# Patient Record
Sex: MALE | Race: ASIAN | Employment: OTHER | URBAN - METROPOLITAN AREA
[De-identification: names, ages, dates, MRNs, and addresses within clinical notes are randomized per-mention and may not be internally consistent; named-entity substitution may affect disease eponyms.]

---

## 2017-04-24 ENCOUNTER — OFFICE VISIT (OUTPATIENT)
Dept: INTERNAL MEDICINE | Facility: CLINIC | Age: 67
End: 2017-04-24
Payer: COMMERCIAL

## 2017-04-24 VITALS
HEIGHT: 62 IN | HEART RATE: 91 BPM | OXYGEN SATURATION: 98 % | WEIGHT: 135.2 LBS | BODY MASS INDEX: 24.88 KG/M2 | DIASTOLIC BLOOD PRESSURE: 60 MMHG | TEMPERATURE: 97.9 F | SYSTOLIC BLOOD PRESSURE: 114 MMHG

## 2017-04-24 DIAGNOSIS — J40 BRONCHITIS: Primary | ICD-10-CM

## 2017-04-24 DIAGNOSIS — Z87.891 PERSONAL HISTORY OF TOBACCO USE, PRESENTING HAZARDS TO HEALTH: ICD-10-CM

## 2017-04-24 PROCEDURE — 99213 OFFICE O/P EST LOW 20 MIN: CPT | Performed by: FAMILY MEDICINE

## 2017-04-24 RX ORDER — CODEINE PHOSPHATE AND GUAIFENESIN 10; 100 MG/5ML; MG/5ML
2-3 SOLUTION ORAL EVERY 4 HOURS PRN
Qty: 240 ML | Refills: 1 | Status: SHIPPED | OUTPATIENT
Start: 2017-04-24 | End: 2017-05-05

## 2017-04-24 RX ORDER — PREDNISONE 20 MG/1
TABLET ORAL
Qty: 15 TABLET | Refills: 0 | Status: SHIPPED | OUTPATIENT
Start: 2017-04-24 | End: 2017-05-05

## 2017-04-24 NOTE — NURSING NOTE
"Chief Complaint   Patient presents with     Cough     He has been coughing for 2 months, also feels like running a fever sometime.       Initial /60 (BP Location: Right arm, Patient Position: Chair, Cuff Size: Adult Large)  Pulse 91  Temp 97.9  F (36.6  C) (Oral)  Ht 5' 2\" (1.575 m)  Wt 135 lb 3.2 oz (61.3 kg)  SpO2 98%  BMI 24.73 kg/m2 Estimated body mass index is 24.73 kg/(m^2) as calculated from the following:    Height as of this encounter: 5' 2\" (1.575 m).    Weight as of this encounter: 135 lb 3.2 oz (61.3 kg).  Medication Reconciliation: complete   Jennifer Joseph MA      "

## 2017-04-24 NOTE — PROGRESS NOTES
"CHIEF COMPLAINT    Persistent cough      HISTORY    Nimco c/o cough X 2 months. Minimally productive cough. He has tried some OTC cough medications w/o benefit. He occasionally feels feverish.    He is a persistent smoker    Patient Active Problem List   Diagnosis     Hepatitis B carrier     Personal history of tobacco use, presenting hazards to health     Pure hypercholesterolemia     Hyperlipidemia LDL goal <130     Advanced directives, counseling/discussion       REVIEW OF SYSTEMS    No HA  No chills  No CP  No edema      Past Medical History:   Diagnosis Date     Hepatitis B carrier      Sciatica        EXAM  /60 (BP Location: Right arm, Patient Position: Chair, Cuff Size: Adult Large)  Pulse 91  Temp 97.9  F (36.6  C) (Oral)  Ht 5' 2\" (1.575 m)  Wt 135 lb 3.2 oz (61.3 kg)  SpO2 98%  BMI 24.73 kg/m2    NAD  Phar: neg  Neck: no mass or adenopathy  Chest: cl      (J40) Bronchitis  (primary encounter diagnosis)  Comment:   Plan: predniSONE (DELTASONE) 20 MG tablet,         amoxicillin-clavulanate (AUGMENTIN) 875-125 MG         per tablet, guaiFENesin-codeine (ROBITUSSIN AC)        100-10 MG/5ML SOLN solution            (Z87.891) Personal history of tobacco use, presenting hazards to health  Comment:   Plan:             "

## 2017-04-24 NOTE — MR AVS SNAPSHOT
"              After Visit Summary   4/24/2017    Nimco Knutson    MRN: [id number]           Patient Information     Date Of Birth          1950        Visit Information        Provider Department      4/24/2017 9:40 AM Chivo Yarbrough MD [facility]        Today's Diagnoses     Bronchitis    -  1    Personal history of tobacco use, presenting hazards to health          Care Instructions      Take prescribed medication as directed.    Return to clinic if not improving over next week.        Follow-ups after your visit        Your next 10 appointments already scheduled     May 05, 2017 10:20 AM CDT   PHYSICAL with Caleb Napier MD   [facility]    [address]  [facility] [address] [phone]              Who to contact     If you have questions or need follow up information about today's clinic visit or your schedule please contact [facility] directly at [phone].  Normal or non-critical lab and imaging results will be communicated to you by MyChart, letter or phone within 4 business days after the clinic has received the results. If you do not hear from us within 7 days, please contact the clinic through [employer] or phone. If you have a critical or abnormal lab result, we will notify you by phone as soon as possible.  Submit refill requests through [employer] or call your pharmacy and they will forward the refill request to us. Please allow 3 business days for your refill to be completed.          Additional Information About Your Visit        Push IOhart Information     [employer] lets you send messages to your doctor, view your test results, renew your prescriptions, schedule appointments and more. To sign up, go to [email or web address] . Click on \"Log in\" on the left side of the screen, which will take you to the Welcome page. Then click on \"Sign up Now\" on the right side of the page.     You will be asked " "to enter the access code listed below, as well as some personal information. Please follow the directions to create your username and password.     Your access code is: [email or web address]  Expires: 2017 10:31 AM     Your access code will  in 90 days. If you need help or a new code, please call your [place] clinic or [phone].        Care EveryWhere ID     This is your Care EveryWhere ID. This could be used by other organizations to access your [place] medical records  [id number]        Your Vitals Were     Pulse Temperature Height Pulse Oximetry BMI (Body Mass Index)       91 97.9  F (36.6  C) (Oral) 5' 2\" (1.575 m) 98% 24.73 kg/m2        Blood Pressure from Last 3 Encounters:   17 114/60   16 119/78   16 112/60    Weight from Last 3 Encounters:   17 135 lb 3.2 oz (61.3 kg)   16 129 lb 8 oz (58.7 kg)   13 126 lb 4.8 oz (57.3 kg)              Today, you had the following     No orders found for display         Today's Medication Changes          These changes are accurate as of: 17 10:31 AM.  If you have any questions, ask your nurse or doctor.               Start taking these medicines.        Dose/Directions    amoxicillin-clavulanate 875-125 MG per tablet   Commonly known as:  AUGMENTIN   Used for:  Bronchitis   Started by:  Chivo Yarbrough MD        Dose:  1 tablet   Take 1 tablet by mouth 2 times daily   Quantity:  20 tablet   Refills:  0       guaiFENesin-codeine 100-10 MG/5ML Soln solution   Commonly known as:  ROBITUSSIN AC   Used for:  Bronchitis   Started by:  Chivo Yarbrough MD        Dose:  2-3 tsp.   Take 10-15 mLs by mouth every 4 hours as needed for cough   Quantity:  240 mL   Refills:  1       predniSONE 20 MG tablet   Commonly known as:  DELTASONE   Used for:  Bronchitis   Started by:  Chivo Yarbrough MD        Take 2 daily for 5 days, then 1 daily for 5 days.   Quantity:  15 tablet   Refills:  0         Stop taking these medicines if you " haven't already. Please contact your care team if you have questions.     brompheniramine-pseudoePHEDrine 1-15 MG/5ML Elix solution   Commonly known as:  DIMETAPP   Stopped by:  Chivo Yarbrough MD           ROBITUSSIN COLD & COUGH PO   Stopped by:  Chivo Yarbrough MD                Where to get your medicines      These medications were sent to [facility] [address] [place] [address] [facility]  [address] [facility] [address]     Phone:  [phone]     amoxicillin-clavulanate 875-125 MG per tablet    predniSONE 20 MG tablet         Some of these will need a paper prescription and others can be bought over the counter.  Ask your nurse if you have questions.     Bring a paper prescription for each of these medications     guaiFENesin-codeine 100-10 MG/5ML Soln solution                Primary Care Provider Office Phone # Fax #    Caleb Napier [phone]       [facility] [address] [place] [address]        Thank you!     Thank you for choosing [facility]  for your care. Our goal is always to provide you with excellent care. Hearing back from our patients is one way we can continue to improve our services. Please take a few minutes to complete the written survey that you may receive in the mail after your visit with us. Thank you!             Your Updated Medication List - Protect others around you: Learn how to safely use, store and throw away your medicines at www.disposemymeds.org.          This list is accurate as of: 4/24/17 10:31 AM.  Always use your most recent med list.                   Brand Name Dispense Instructions for use    ADVIL PO      Take 2 tablets every 4-5 hours as needed.       amoxicillin-clavulanate 875-125 MG per tablet    AUGMENTIN    20 tablet    Take 1 tablet by mouth 2 times daily       guaiFENesin-codeine 100-10 MG/5ML Soln solution    ROBITUSSIN AC    240 mL    Take 10-15 mLs by mouth every 4  hours as needed for cough       predniSONE 20 MG tablet    DELTASONE    15 tablet    Take 2 daily for 5 days, then 1 daily for 5 days.

## 2017-05-05 ENCOUNTER — OFFICE VISIT (OUTPATIENT)
Dept: INTERNAL MEDICINE | Facility: CLINIC | Age: 67
End: 2017-05-05
Payer: COMMERCIAL

## 2017-05-05 VITALS
HEART RATE: 89 BPM | HEIGHT: 62 IN | TEMPERATURE: 98.1 F | OXYGEN SATURATION: 97 % | WEIGHT: 131 LBS | SYSTOLIC BLOOD PRESSURE: 100 MMHG | DIASTOLIC BLOOD PRESSURE: 60 MMHG | BODY MASS INDEX: 24.11 KG/M2

## 2017-05-05 DIAGNOSIS — R05.9 COUGH: ICD-10-CM

## 2017-05-05 DIAGNOSIS — E78.5 HYPERLIPIDEMIA LDL GOAL <130: ICD-10-CM

## 2017-05-05 DIAGNOSIS — Z12.5 ENCOUNTER FOR SCREENING FOR MALIGNANT NEOPLASM OF PROSTATE: ICD-10-CM

## 2017-05-05 DIAGNOSIS — Z87.891 HISTORY OF TOBACCO USE: ICD-10-CM

## 2017-05-05 DIAGNOSIS — N43.2 OTHER HYDROCELE: ICD-10-CM

## 2017-05-05 DIAGNOSIS — Z00.00 ROUTINE GENERAL MEDICAL EXAMINATION AT A HEALTH CARE FACILITY: Primary | ICD-10-CM

## 2017-05-05 LAB
ALBUMIN UR-MCNC: NEGATIVE MG/DL
APPEARANCE UR: CLEAR
BILIRUB UR QL STRIP: NEGATIVE
COLOR UR AUTO: YELLOW
ERYTHROCYTE [DISTWIDTH] IN BLOOD BY AUTOMATED COUNT: 12.7 % (ref 10–15)
GLUCOSE UR STRIP-MCNC: NEGATIVE MG/DL
HCT VFR BLD AUTO: 45.7 % (ref 40–53)
HGB BLD-MCNC: 15.1 G/DL (ref 13.3–17.7)
HGB UR QL STRIP: NEGATIVE
KETONES UR STRIP-MCNC: NEGATIVE MG/DL
LEUKOCYTE ESTERASE UR QL STRIP: NEGATIVE
MCH RBC QN AUTO: 30.9 PG (ref 26.5–33)
MCHC RBC AUTO-ENTMCNC: 33 G/DL (ref 31.5–36.5)
MCV RBC AUTO: 94 FL (ref 78–100)
NITRATE UR QL: NEGATIVE
PH UR STRIP: 6.5 PH (ref 5–7)
PLATELET # BLD AUTO: 141 10E9/L (ref 150–450)
PSA SERPL-ACNC: 1.56 UG/L (ref 0–4)
RBC # BLD AUTO: 4.89 10E12/L (ref 4.4–5.9)
SP GR UR STRIP: 1.01 (ref 1–1.03)
URN SPEC COLLECT METH UR: NORMAL
UROBILINOGEN UR STRIP-ACNC: 0.2 EU/DL (ref 0.2–1)
WBC # BLD AUTO: 6.3 10E9/L (ref 4–11)

## 2017-05-05 PROCEDURE — G0103 PSA SCREENING: HCPCS | Performed by: INTERNAL MEDICINE

## 2017-05-05 PROCEDURE — G0296 VISIT TO DETERM LDCT ELIG: HCPCS | Performed by: INTERNAL MEDICINE

## 2017-05-05 PROCEDURE — 80053 COMPREHEN METABOLIC PANEL: CPT | Performed by: INTERNAL MEDICINE

## 2017-05-05 PROCEDURE — 85027 COMPLETE CBC AUTOMATED: CPT | Performed by: INTERNAL MEDICINE

## 2017-05-05 PROCEDURE — 84443 ASSAY THYROID STIM HORMONE: CPT | Performed by: INTERNAL MEDICINE

## 2017-05-05 PROCEDURE — 80061 LIPID PANEL: CPT | Performed by: INTERNAL MEDICINE

## 2017-05-05 PROCEDURE — 81003 URINALYSIS AUTO W/O SCOPE: CPT | Performed by: INTERNAL MEDICINE

## 2017-05-05 PROCEDURE — 36415 COLL VENOUS BLD VENIPUNCTURE: CPT | Performed by: INTERNAL MEDICINE

## 2017-05-05 PROCEDURE — G0009 ADMIN PNEUMOCOCCAL VACCINE: HCPCS | Performed by: INTERNAL MEDICINE

## 2017-05-05 PROCEDURE — 90670 PCV13 VACCINE IM: CPT | Performed by: INTERNAL MEDICINE

## 2017-05-05 PROCEDURE — 99397 PER PM REEVAL EST PAT 65+ YR: CPT | Mod: 25 | Performed by: INTERNAL MEDICINE

## 2017-05-05 NOTE — MR AVS SNAPSHOT
After Visit Summary   5/5/2017    Nimco Knutson    MRN: [id number]           Patient Information     Date Of Birth          1950        Visit Information        Provider Department      5/5/2017 10:20 AM Caleb Napier MD [facility]        Today's Diagnoses     Routine general medical examination at a health care facility    -  1    Other hydrocele        Hyperlipidemia LDL goal <130        Cough        History of tobacco use        Encounter for screening for malignant neoplasm of prostate           Care Instructions      Preventive Health Recommendations:       Male Ages 65 and over    Yearly exam:             See your health care provider every year in order to  o   Review health changes.   o   Discuss preventive care.    o   Review your medicines if your doctor has prescribed any.    Talk with your health care provider about whether you should have a test to screen for prostate cancer (PSA).    Every 3 years, have a diabetes test (fasting glucose). If you are at risk for diabetes, you should have this test more often.    Every 5 years, have a cholesterol test. Have this test more often if you are at risk for high cholesterol or heart disease.     Every 10 years, have a colonoscopy. Or, have a yearly FIT test (stool test). These exams will check for colon cancer.    Talk to with your health care provider about screening for Abdominal Aortic Aneurysm if you have a family history of AAA or have a history of smoking.  Shots:     Get a flu shot each year.     Get a tetanus shot every 10 years.     Talk to your doctor about your pneumonia vaccines. There are now two you should receive - Pneumovax (PPSV 23) and Prevnar (PCV 13).    Talk to your doctor about a shingles vaccine.     Talk to your doctor about the hepatitis B vaccine.  Nutrition:     Eat at least 5 servings of fruits and vegetables each day.     Eat whole-grain bread, whole-wheat pasta and brown rice instead of  white grains and rice.     Talk to your doctor about Calcium and Vitamin D.   Lifestyle    Exercise for at least 150 minutes a week (30 minutes a day, 5 days a week). This will help you control your weight and prevent disease.     Limit alcohol to one drink per day.     No smoking.     Wear sunscreen to prevent skin cancer.     See your dentist every six months for an exam and cleaning.     See your eye doctor every 1 to 2 years to screen for conditions such as glaucoma, macular degeneration and cataracts.  Lung Cancer Screening   Frequently Asked Questions  If you are at high-risk for lung cancer, getting screened with low-dose computed tomography (LDCT) every year can help save your life. This handout offers answers to some of the most common questions about lung cancer screening. If you have other questions, please call [phone] ([phone]).     What is it?  Lung cancer screening uses special X-ray technology to create an image of your lung tissue. The exam is quick and easy and takes less than 10 seconds. We don t give you any medicine or use any needles. You can eat before and after the exam. You don t need to change your clothes as long as the clothing on your chest doesn t contain metal. But, you do need to be able to hold your breath for at least 6 seconds during the exam.    What is the goal of lung cancer screening?  The goal of lung cancer screening is to save lives. Many times, lung cancer is not found until a person starts having physical symptoms. Lung cancer screening can help detect lung cancer in the earliest stages when it may be easier to treat.    Who should be screened for lung cancer?  We suggest lung cancer screening for anyone who is at high-risk for lung cancer. You are in the high-risk group if you:      are between the ages of 55 and 79, and    have smoked at least 1 pack of cigarettes a day for 30 or more years, and    still smoke or have quit within the past 15  years.    However, if you have a new cough or shortness of breath, you should talk to your doctor before being screened.    Some national lung health advocacy groups also recommend screening for people ages 50 to 79 who have smoked an average of 1 pack of cigarettes a day for 20 years. They must also have at least 1 other risk factor for lung cancer, not including exposure to secondhand smoke. Other risk factors are having had cancer in the past, emphysema, pulmonary fibrosis, COPD, a family history of lung cancer, or exposure to certain materials such as arsenic, asbestos, beryllium, cadmium, chromium, diesel fumes, nickel, radon or silica. Your care team can help you know if you have one of these risk factors.     Why does it matter if I have symptoms?  Certain symptoms can be a sign that you have a condition in your lungs that should be checked and treated by your doctor. These symptoms include fever, chest pain, a new or changing cough, shortness of breath that you have never felt before, coughing up blood or unexplained weight loss. Having any of these symptoms can greatly affect the results of lung cancer screening.       Should all smokers get an LDCT lung cancer screening exam?  It depends. Lung cancer screening is for a very specific group of men and women who have a history of heavy smoking over a long period of time (see  Who should be screened for lung cancer  above).  I am in the high-risk group, but have been diagnosed with cancer in the past. Is LDCT lung cancer screening right for me?  In some cases, you should not have LDCT lung screening, such as when your doctor is already following your cancer with CT scan studies. Your doctor will help you decide if LDCT lung screening is right for you.  Do I need to have a screening exam every year?  Yes. If you are in the high-risk group described earlier, you should get an LDCT lung cancer screening exam every year until you are 79, or are no longer willing  or able to undergo screening and possible procedures to diagnose and treat lung cancer.  How effective is LDCT at preventing death from lung cancer?  Studies have shown that LDCT lung cancer screening can lower the risk of death from lung cancer by 20 percent in people who are at high-risk.  What are the risks?  There are some risks and limitations of LDCT lung cancer screening. We want to make sure you understand the risks and benefits, so please let us know if you have any questions. Your doctor may want to talk with you more about these risks.    Radiation exposure: As with any exam that uses radiation, there is a very small increased risk of cancer. The amount of radiation in LDCT is small--about the same amount a person would get from a mammogram. Your doctor orders the exam when he or she feels the potential benefits outweigh the risks.    False negatives: No test is perfect, including LDCT. It is possible that you may have a medical condition, including lung cancer, that is not found during your exam. This is called a false negative result.    False positives and more testing: LDCT very often finds something in the lung that could be cancer, but in fact is not. This is called a false positive result. False positive tests often cause anxiety. To make sure these findings are not cancer, you may need to have more tests. These tests will be done only if you give us permission. Sometimes patients need a treatment that can have side effects, such as a biopsy. For more information on false positives, see  What can I expect from the results?     Findings not related to lung cancer: Your LDCT exam also takes pictures of areas of your body next to your lungs. In a very small number of cases, the CT scan will show an abnormal finding in one of these areas, such as your kidneys, adrenal glands, liver or thyroid. This finding may not be serious, but you may need more tests. Your doctor can help you decide what other tests  you may need, if any.  What can I expect from the results?  About 1 out of 4 LDCT exams will find something that may need more tests. Most of the time, these findings are lung nodules. Lung nodules are very small collections of tissue in the lung. These nodules are very common, and the vast majority--more than 97 percent--are not cancer (benign). Most are normal lymph nodes or small areas of scarring from past infections.  But, if a small lung nodule is found to be cancer, the cancer can be cured more than 90 percent of the time. To know if the nodule is cancer, we may need to get more images before your next yearly screening exam. If the nodule has suspicious features (for example, it is large, has an odd shape or grows over time), we will refer you to a specialist for further testing.  Will my doctor also get the results?  Yes. Your doctor will get a copy of your results.  Is it okay to keep smoking now that there s a cancer screening exam?  No. Tobacco is one of the strongest cancer-causing agents. It causes not only lung cancer, but other cancers and cardiovascular (heart) diseases as well. The damage caused by smoking builds over time. This means that the longer you smoke, the higher your risk of disease. While it is never too late to quit, the sooner you quit, the better.  Where can I find help to quit smoking?  The best way to prevent lung cancer is to stop smoking. If you have already quit smoking, congratulations and keep it up! For help on quitting smoking, please call [employer] at 0-725-934-OVSO ([phone]) or the American Cancer Society at 1-500.388.8882 to find local resources near you.  One-on-one health coaching:  If you d prefer to work individually with a health care provider on tobacco cessation, we offer:      Medication Therapy Management:  Our specially trained pharmacists work closely with you and your doctor to help you quit smoking.  Call [phone] or [phone] (toll free).     Can Do: Health  coaching offered by [facility].  [email or web address]            Follow-ups after your visit        Additional Services     UROLOGY ADULT REFERRAL       Your provider has referred you to: [facility] [phone]   [email or web address]    Please be aware that coverage of these services is subject to the terms and limitations of your health insurance plan.  Call member services at your health plan with any benefit or coverage questions.      Please bring the following with you to your appointment:    (1) Any X-Rays, CTs or MRIs which have been performed.  Contact the facility where they were done to arrange for  prior to your scheduled appointment.    (2) List of current medications  (3) This referral request   (4) Any documents/labs given to you for this referral                  Future tests that were ordered for you today     Open Future Orders        Priority Expected Expires Ordered    CT Chest Lung Cancer Scrn Low Dose wo Routine  5/5/2018 5/5/2017            Who to contact     If you have questions or need follow up information about today's clinic visit or your schedule please contact [facility] directly at [phone].  Normal or non-critical lab and imaging results will be communicated to you by MyChart, letter or phone within 4 business days after the clinic has received the results. If you do not hear from us within 7 days, please contact the clinic through MyChart or phone. If you have a critical or abnormal lab result, we will notify you by phone as soon as possible.  Submit refill requests through [employer] or call your pharmacy and they will forward the refill request to us. Please allow 3 business days for your refill to be completed.          Additional Information About Your Visit        Delf[employer]art Information     [employer] lets you send messages to your doctor, view your test results, renew your prescriptions,  "schedule appointments and more. To sign up, go to [email or web address] . Click on \"Log in\" on the left side of the screen, which will take you to the Welcome page. Then click on \"Sign up Now\" on the right side of the page.     You will be asked to enter the access code listed below, as well as some personal information. Please follow the directions to create your username and password.     Your access code is: [email or web address]  Expires: 2017 10:31 AM     Your access code will  in 90 days. If you need help or a new code, please call your [place] clinic or [phone].        Care EveryWhere ID     This is your Care EveryWhere ID. This could be used by other organizations to access your [place] medical records  [id number]        Your Vitals Were     Pulse Temperature Height Pulse Oximetry BMI (Body Mass Index)       89 98.1  F (36.7  C) (Oral) 5' 1.75\" (1.568 m) 97% 24.15 kg/m2        Blood Pressure from Last 3 Encounters:   17 100/60   17 114/60   16 119/78    Weight from Last 3 Encounters:   17 131 lb (59.4 kg)   17 135 lb 3.2 oz (61.3 kg)   16 129 lb 8 oz (58.7 kg)              We Performed the Following     *UA reflex to Microscopic and Culture ([facility] (except [place] and [facility])     CBC with platelets     Comprehensive metabolic panel     Lipid panel reflex to direct LDL     Okay for Smoking Cessation Study (PLUTO) to Contact Patient     Prof fee: Shared Decisionmaking for Lung Cancer Screening     Prostate spec antigen screen     TSH with free T4 reflex     UROLOGY ADULT REFERRAL        Primary Care Provider Office Phone # Fax #    Caleb Napier [phone]       [facility] [address] [facility] [address]        Thank you!     Thank you for choosing [facility]  for your care. Our goal is always to provide you with excellent care. Hearing back from our patients is one way we can " continue to improve our services. Please take a few minutes to complete the written survey that you may receive in the mail after your visit with us. Thank you!             Your Updated Medication List - Protect others around you: Learn how to safely use, store and throw away your medicines at www.disposemymeds.org.          This list is accurate as of: 5/5/17 10:54 AM.  Always use your most recent med list.                   Brand Name Dispense Instructions for use    ADVIL PO      Take 2 tablets every 4-5 hours as needed.

## 2017-05-05 NOTE — PATIENT INSTRUCTIONS
Preventive Health Recommendations:       Male Ages 65 and over    Yearly exam:             See your health care provider every year in order to  o   Review health changes.   o   Discuss preventive care.    o   Review your medicines if your doctor has prescribed any.    Talk with your health care provider about whether you should have a test to screen for prostate cancer (PSA).    Every 3 years, have a diabetes test (fasting glucose). If you are at risk for diabetes, you should have this test more often.    Every 5 years, have a cholesterol test. Have this test more often if you are at risk for high cholesterol or heart disease.     Every 10 years, have a colonoscopy. Or, have a yearly FIT test (stool test). These exams will check for colon cancer.    Talk to with your health care provider about screening for Abdominal Aortic Aneurysm if you have a family history of AAA or have a history of smoking.  Shots:     Get a flu shot each year.     Get a tetanus shot every 10 years.     Talk to your doctor about your pneumonia vaccines. There are now two you should receive - Pneumovax (PPSV 23) and Prevnar (PCV 13).    Talk to your doctor about a shingles vaccine.     Talk to your doctor about the hepatitis B vaccine.  Nutrition:     Eat at least 5 servings of fruits and vegetables each day.     Eat whole-grain bread, whole-wheat pasta and brown rice instead of white grains and rice.     Talk to your doctor about Calcium and Vitamin D.   Lifestyle    Exercise for at least 150 minutes a week (30 minutes a day, 5 days a week). This will help you control your weight and prevent disease.     Limit alcohol to one drink per day.     No smoking.     Wear sunscreen to prevent skin cancer.     See your dentist every six months for an exam and cleaning.     See your eye doctor every 1 to 2 years to screen for conditions such as glaucoma, macular degeneration and cataracts.  Lung Cancer Screening   Frequently Asked Questions  If you  are at high-risk for lung cancer, getting screened with low-dose computed tomography (LDCT) every year can help save your life. This handout offers answers to some of the most common questions about lung cancer screening. If you have other questions, please call [phone] ([phone]).     What is it?  Lung cancer screening uses special X-ray technology to create an image of your lung tissue. The exam is quick and easy and takes less than 10 seconds. We don t give you any medicine or use any needles. You can eat before and after the exam. You don t need to change your clothes as long as the clothing on your chest doesn t contain metal. But, you do need to be able to hold your breath for at least 6 seconds during the exam.    What is the goal of lung cancer screening?  The goal of lung cancer screening is to save lives. Many times, lung cancer is not found until a person starts having physical symptoms. Lung cancer screening can help detect lung cancer in the earliest stages when it may be easier to treat.    Who should be screened for lung cancer?  We suggest lung cancer screening for anyone who is at high-risk for lung cancer. You are in the high-risk group if you:      are between the ages of 55 and 79, and    have smoked at least 1 pack of cigarettes a day for 30 or more years, and    still smoke or have quit within the past 15 years.    However, if you have a new cough or shortness of breath, you should talk to your doctor before being screened.    Some national lung health advocacy groups also recommend screening for people ages 50 to 79 who have smoked an average of 1 pack of cigarettes a day for 20 years. They must also have at least 1 other risk factor for lung cancer, not including exposure to secondhand smoke. Other risk factors are having had cancer in the past, emphysema, pulmonary fibrosis, COPD, a family history of lung cancer, or exposure to certain materials such as arsenic, asbestos,  beryllium, cadmium, chromium, diesel fumes, nickel, radon or silica. Your care team can help you know if you have one of these risk factors.     Why does it matter if I have symptoms?  Certain symptoms can be a sign that you have a condition in your lungs that should be checked and treated by your doctor. These symptoms include fever, chest pain, a new or changing cough, shortness of breath that you have never felt before, coughing up blood or unexplained weight loss. Having any of these symptoms can greatly affect the results of lung cancer screening.       Should all smokers get an LDCT lung cancer screening exam?  It depends. Lung cancer screening is for a very specific group of men and women who have a history of heavy smoking over a long period of time (see  Who should be screened for lung cancer  above).  I am in the high-risk group, but have been diagnosed with cancer in the past. Is LDCT lung cancer screening right for me?  In some cases, you should not have LDCT lung screening, such as when your doctor is already following your cancer with CT scan studies. Your doctor will help you decide if LDCT lung screening is right for you.  Do I need to have a screening exam every year?  Yes. If you are in the high-risk group described earlier, you should get an LDCT lung cancer screening exam every year until you are 79, or are no longer willing or able to undergo screening and possible procedures to diagnose and treat lung cancer.  How effective is LDCT at preventing death from lung cancer?  Studies have shown that LDCT lung cancer screening can lower the risk of death from lung cancer by 20 percent in people who are at high-risk.  What are the risks?  There are some risks and limitations of LDCT lung cancer screening. We want to make sure you understand the risks and benefits, so please let us know if you have any questions. Your doctor may want to talk with you more about these risks.    Radiation exposure: As  with any exam that uses radiation, there is a very small increased risk of cancer. The amount of radiation in LDCT is small about the same amount a person would get from a mammogram. Your doctor orders the exam when he or she feels the potential benefits outweigh the risks.    False negatives: No test is perfect, including LDCT. It is possible that you may have a medical condition, including lung cancer, that is not found during your exam. This is called a false negative result.    False positives and more testing: LDCT very often finds something in the lung that could be cancer, but in fact is not. This is called a false positive result. False positive tests often cause anxiety. To make sure these findings are not cancer, you may need to have more tests. These tests will be done only if you give us permission. Sometimes patients need a treatment that can have side effects, such as a biopsy. For more information on false positives, see  What can I expect from the results?     Findings not related to lung cancer: Your LDCT exam also takes pictures of areas of your body next to your lungs. In a very small number of cases, the CT scan will show an abnormal finding in one of these areas, such as your kidneys, adrenal glands, liver or thyroid. This finding may not be serious, but you may need more tests. Your doctor can help you decide what other tests you may need, if any.  What can I expect from the results?  About 1 out of 4 LDCT exams will find something that may need more tests. Most of the time, these findings are lung nodules. Lung nodules are very small collections of tissue in the lung. These nodules are very common, and the vast majority more than 97 percent are not cancer (benign). Most are normal lymph nodes or small areas of scarring from past infections.  But, if a small lung nodule is found to be cancer, the cancer can be cured more than 90 percent of the time. To know if the nodule is cancer, we may need  to get more images before your next yearly screening exam. If the nodule has suspicious features (for example, it is large, has an odd shape or grows over time), we will refer you to a specialist for further testing.  Will my doctor also get the results?  Yes. Your doctor will get a copy of your results.  Is it okay to keep smoking now that there s a cancer screening exam?  No. Tobacco is one of the strongest cancer-causing agents. It causes not only lung cancer, but other cancers and cardiovascular (heart) diseases as well. The damage caused by smoking builds over time. This means that the longer you smoke, the higher your risk of disease. While it is never too late to quit, the sooner you quit, the better.  Where can I find help to quit smoking?  The best way to prevent lung cancer is to stop smoking. If you have already quit smoking, congratulations and keep it up! For help on quitting smoking, please call [employer] at 8-737-225-UTWQ ([phone]) or the American Cancer Society at 1-670.575.9794 to find local resources near you.  One-on-one health coaching:  If you d prefer to work individually with a health care provider on tobacco cessation, we offer:      Medication Therapy Management:  Our specially trained pharmacists work closely with you and your doctor to help you quit smoking.  Call [phone] or [phone] (toll free).     Can Do: Health coaching offered by [facility].  [email or web address]

## 2017-05-05 NOTE — PROGRESS NOTES
Lung Cancer Screening Shared Decision Making Visit     Nimco Knutson is eligible for lung cancer screening on the basis of the information provided in my signed lung cancer screening order.     I have discussed with patient the risks and benefits of screening for lung cancer with low-dose CT.     The risks include:   radiation exposure    false positives     over-diagnosis    The benefit of early detection of lung cancer is contingent upon adherence to annual screening or more frequent follow up if indicated.     Furthermore, reaping the benefits of screening requires Nimco Knutson to be willing and physically able to undergo diagnostic procedures, if indicated. Although no specific guide is available for determining severity of comorbidities, it is reasonable to withhold screening in patients who have greater mortality risk from other diseases.     We did discuss that the only way to prevent lung cancer is to not smoke. Smoking cessation assistance was offered.    I did offer risk estimation using a calculator such as this one:    ShouldIScreen

## 2017-05-05 NOTE — PROGRESS NOTES
SUBJECTIVE:                                                            Nimco Knutson is a 66 year old male who presents for Preventive Visit.      Are you in the first 12 months of your Medicare Part B coverage?  No    Healthy Habits:    Do you get at least three servings of calcium containing foods daily (dairy, green leafy vegetables, etc.)? yes    Amount of exercise or daily activities, outside of work: walk few days per week    Problems taking medications regularly: N/A    Medication side effects: N/A    Have you had an eye exam in the past two years? no    Do you see a dentist twice per year? yes    Do you have sleep apnea, excessive snoring or daytime drowsiness?no    COGNITIVE SCREEN  1) Repeat 3 items (Banana, Sunrise, Chair)    2) Clock draw: NORMAL  3) 3 item recall: Recalls 3 objects  Results: 3 items recalled: COGNITIVE IMPAIRMENT LESS LIKELY    Mini-CogTM Copyright S Mer[employer]. Licensed by the author for use in [facility]; reprinted with permission (ken@[facility]). All rights reserved.            PROBLEMS TO ADD ON...    Has had bronchitis. Treated with augmentin and prednisone. Improved. Has dry cough. Has h/o smoking.   Has a left testicle hydrocele. No pain.     Reviewed and updated as needed this visit by clinical staff         Reviewed and updated as needed this visit by Provider        Social History   Substance Use Topics     Smoking status: Current Every Day Smoker     Packs/day: 0.50     Years: 40.00     Types: Cigarettes     Smokeless tobacco: Never Used     Alcohol use 0.0 oz/week     0 Standard drinks or equivalent per week      Comment: socially       The patient does not drink >3 drinks per day nor >7 drinks per week.    Today's PHQ-2 Score: 0  PHQ-2 ( 1999 Pfizer) 4/24/2017 1/4/2016   Q1: Little interest or pleasure in doing things 0 0   Q2: Feeling down, depressed or hopeless 0 0   PHQ-2 Score 0 0       Do you feel safe in your environment - Yes    Do you have a Health Care  Directive?: Yes: Patient states has Advance Directive and will bring in a copy to clinic.    Current providers sharing in care for this patient include:   Patient Care Team:  Caleb Napier MD as PCP - General (Internal Medicine)      Hearing impairment: Yes, Difficulty following a conversation in a noisy restaurant or crowded room.    Ability to successfully perform activities of daily living: Yes, no assistance needed     Fall risk:       Home safety:  none identified      The following health maintenance items are reviewed in Epic and correct as of today:  Health Maintenance   Topic Date Due     HEPATITIS C SCREENING  12/08/1968     PNEUMOCOCCAL (1 of 2 - PCV13) 12/08/2015     AORTIC ANEURYSM SCREENING (SYSTEM ASSIGNED)  12/08/2015     LUNG CANCER SCREENING ANNUAL  04/14/2017     INFLUENZA VACCINE (SYSTEM ASSIGNED)  09/01/2017     FALL RISK ASSESSMENT  04/24/2018     ADVANCE DIRECTIVE PLANNING Q5 YRS (NO INBASKET)  05/13/2018     TETANUS Q10 YR  08/13/2019     LIPID SCREEN Q5 YR MALE (SYSTEM ASSIGNED)  01/04/2021     COLONOSCOPY Q10 YR INBASKET MESSAGE  01/21/2026         Pneumonia Vaccine:Adults age 65+ who received Pneumovax (PPSV23) at 65 years or older: Should be given PCV13 > 1 year after their most recent PPSV23     ROS:  C: NEGATIVE for fever, chills, change in weight  I: NEGATIVE for worrisome rashes, moles or lesions  E: NEGATIVE for vision changes or irritation  E/M: NEGATIVE for ear, mouth and throat problems  R: NEGATIVE for significant cough or SOB  B: NEGATIVE for masses, tenderness or discharge  CV: NEGATIVE for chest pain, palpitations or peripheral edema  GI: NEGATIVE for nausea, abdominal pain, heartburn, or change in bowel habits  : NEGATIVE for frequency, dysuria, or hematuria  M: NEGATIVE for significant arthralgias or myalgia  N: NEGATIVE for weakness, dizziness or paresthesias  E: NEGATIVE for temperature intolerance, skin/hair changes  H: NEGATIVE for bleeding problems  P: NEGATIVE  "for changes in mood or affect    Problem list, Medication list, Allergies, and Medical/Social/Surgical histories reviewed in [facility] and updated as appropriate.  OBJECTIVE:                                                            There were no vitals taken for this visit. Estimated body mass index is 24.73 kg/(m^2) as calculated from the following:    Height as of 4/24/17: 5' 2\" (1.575 m).    Weight as of 4/24/17: 135 lb 3.2 oz (61.3 kg).  EXAM:   GENERAL: healthy, alert and no distress  EYES: Eyes grossly normal to inspection, PERRL and conjunctivae and sclerae normal  HENT: ear canals and TM's normal, nose and mouth without ulcers or lesions  NECK: no adenopathy, no asymmetry, masses, or scars and thyroid normal to palpation  RESP: lungs clear to auscultation - no rales, rhonchi or wheezes  CV: regular rate and rhythm, normal S1 S2, no S3 or S4, no murmur, click or rub, no peripheral edema and peripheral pulses strong  ABDOMEN: soft, nontender, no hepatosplenomegaly, no masses and bowel sounds normal   (male): normal male genitalia without lesions or urethral discharge, no hernia, left testicular enlargement, no pain.   MS: no gross musculoskeletal defects noted, no edema  SKIN: no suspicious lesions or rashes  NEURO: Normal strength and tone, mentation intact and speech normal  PSYCH: mentation appears normal, affect normal/bright    ASSESSMENT / PLAN:                                                                ICD-10-CM    1. Routine general medical examination at a health care facility Z00.00 Lipid panel reflex to direct LDL     Prostate spec antigen screen     CBC with platelets     Comprehensive metabolic panel     TSH with free T4 reflex     *UA reflex to Microscopic and Culture ([facility] (except [place] and [place])   2. Other hydrocele N43.2 UROLOGY ADULT REFERRAL   3. Hyperlipidemia LDL goal <130 E78.5    4. Cough R05    5. History of tobacco use Z87.891 Prof fee: Shared " "Decisionmaking for Lung Cancer Screening     CT Chest Lung Cancer Scrn Low Dose wo     Okay for Smoking Cessation Study (PLUTO) to Contact Patient   6. Encounter for screening for malignant neoplasm of prostate  Z12.5 Prostate spec antigen screen       End of Life Planning:  Patient currently has an advanced directive: Yes.  Practitioner is supportive of decision.    COUNSELING:  Reviewed preventive health counseling, as reflected in patient instructions       Regular exercise       Healthy diet/nutrition       Vision screening       Hearing screening       Dental care       Colon cancer screening       Prostate cancer screening        Estimated body mass index is 24.73 kg/(m^2) as calculated from the following:    Height as of 4/24/17: 5' 2\" (1.575 m).    Weight as of 4/24/17: 135 lb 3.2 oz (61.3 kg).     reports that he has been smoking Cigarettes.  He has a 20.00 pack-year smoking history. He has never used smokeless tobacco.  Tobacco Cessation Action Plan: Information offered: Patient not interested at this time    Appropriate preventive services were discussed with this patient, including applicable screening as appropriate for cardiovascular disease, diabetes, osteopenia/osteoporosis, and glaucoma.  As appropriate for age/gender, discussed screening for colorectal cancer, prostate cancer, breast cancer, and cervical cancer. Checklist reviewing preventive services available has been given to the patient.    Reviewed patients plan of care and provided an AVS. The Intermediate Care Plan ( asthma action plan, low back pain action plan, and migraine action plan) for Nimco meets the Care Plan requirement. This Care Plan has been established and reviewed with the Patient.    Counseling Resources:  ATP IV Guidelines  Pooled Cohorts Equation Calculator  Breast Cancer Risk Calculator  FRAX Risk Assessment  ICSI Preventive Guidelines  Dietary Guidelines for Americans, 2010  USDA's MyPlate  ASA Prophylaxis  Lung CA " Screening    Caleb Napier MD  [facility]

## 2017-05-05 NOTE — NURSING NOTE
"Chief Complaint   Patient presents with     Wellness Visit     Fasting Px       Initial /60  Pulse 89  Temp 98.1  F (36.7  C) (Oral)  Ht 5' 1.75\" (1.568 m)  Wt 131 lb (59.4 kg)  SpO2 97%  BMI 24.15 kg/m2 Estimated body mass index is 24.15 kg/(m^2) as calculated from the following:    Height as of this encounter: 5' 1.75\" (1.568 m).    Weight as of this encounter: 131 lb (59.4 kg).  Medication Reconciliation: complete   Elyssa Coleman MA      "

## 2017-05-06 LAB
ALBUMIN SERPL-MCNC: 3.8 G/DL (ref 3.4–5)
ALP SERPL-CCNC: 93 U/L (ref 40–150)
ALT SERPL W P-5'-P-CCNC: 35 U/L (ref 0–70)
ANION GAP SERPL CALCULATED.3IONS-SCNC: 8 MMOL/L (ref 3–14)
AST SERPL W P-5'-P-CCNC: 20 U/L (ref 0–45)
BILIRUB SERPL-MCNC: 0.5 MG/DL (ref 0.2–1.3)
BUN SERPL-MCNC: 14 MG/DL (ref 7–30)
CALCIUM SERPL-MCNC: 8.9 MG/DL (ref 8.5–10.1)
CHLORIDE SERPL-SCNC: 107 MMOL/L (ref 94–109)
CHOLEST SERPL-MCNC: 217 MG/DL
CO2 SERPL-SCNC: 27 MMOL/L (ref 20–32)
CREAT SERPL-MCNC: 0.82 MG/DL (ref 0.66–1.25)
GFR SERPL CREATININE-BSD FRML MDRD: NORMAL ML/MIN/1.7M2
GLUCOSE SERPL-MCNC: 97 MG/DL (ref 70–99)
HDLC SERPL-MCNC: 67 MG/DL
LDLC SERPL CALC-MCNC: 130 MG/DL
NONHDLC SERPL-MCNC: 150 MG/DL
POTASSIUM SERPL-SCNC: 4.5 MMOL/L (ref 3.4–5.3)
PROT SERPL-MCNC: 7.2 G/DL (ref 6.8–8.8)
SODIUM SERPL-SCNC: 142 MMOL/L (ref 133–144)
TRIGL SERPL-MCNC: 101 MG/DL
TSH SERPL DL<=0.005 MIU/L-ACNC: 0.57 MU/L (ref 0.4–4)

## 2017-05-11 ENCOUNTER — HOSPITAL ENCOUNTER (OUTPATIENT)
Dept: CT IMAGING | Facility: CLINIC | Age: 67
Discharge: HOME | End: 2017-05-11
Attending: INTERNAL MEDICINE | Admitting: INTERNAL MEDICINE
Payer: MEDICARE

## 2017-05-11 DIAGNOSIS — Z87.891 HISTORY OF TOBACCO USE: ICD-10-CM

## 2017-05-11 PROCEDURE — G0297 LDCT FOR LUNG CA SCREEN: HCPCS

## 2017-08-11 ENCOUNTER — OFFICE VISIT (OUTPATIENT)
Dept: UROLOGY | Facility: CLINIC | Age: 67
End: 2017-08-11
Payer: COMMERCIAL

## 2017-08-11 VITALS
HEIGHT: 64 IN | HEART RATE: 88 BPM | DIASTOLIC BLOOD PRESSURE: 60 MMHG | SYSTOLIC BLOOD PRESSURE: 126 MMHG | BODY MASS INDEX: 22.53 KG/M2 | WEIGHT: 132 LBS

## 2017-08-11 DIAGNOSIS — N43.40 SPERMATOCELE: Primary | ICD-10-CM

## 2017-08-11 PROCEDURE — 99203 OFFICE O/P NEW LOW 30 MIN: CPT | Performed by: UROLOGY

## 2017-08-11 ASSESSMENT — PAIN SCALES - GENERAL: PAINLEVEL: NO PAIN (0)

## 2017-08-11 NOTE — PROGRESS NOTES
Chief Complaint:   Hydrocele         Consult or Referral:     Mr. Nimco Knutson is a 66 year old male seen in consultation from Dr. Napier.         History of Present Illness:   Nimco Knutson is a very pleasant 66 year old male who presents with a history of hydroceles.  Slowly growing over 4 years. No pain. No significant LUTS. No UTIs. No hematuria. Patient is somewhat nervous and bothered by presence of spermatocele.         Past Medical History:     Past Medical History:   Diagnosis Date     Hepatitis B carrier (H)      Sciatica           Past Surgical History:     Past Surgical History:   Procedure Laterality Date     C HUNT W/O FACETEC FORAMOT/DSKC  VRT SEG, LUMBAR      L4-5 disc R          Medications     Current Outpatient Prescriptions   Medication     Ibuprofen (ADVIL PO)     No current facility-administered medications for this visit.             Family History:     Family History   Problem Relation Age of Onset     Family History Negative Father       at age 90     Family History Negative Mother      CEREBROVASCULAR DISEASE Brother      DIABETES No family hx of      Coronary Artery Disease No family hx of      Hypertension No family hx of      Hyperlipidemia No family hx of           Social History:     Social History     Social History     Marital status:      Spouse name: N/A     Number of children: 3     Years of education: N/A     Occupational History     Not on file.     Social History Main Topics     Smoking status: Current Every Day Smoker     Packs/day: 0.50     Years: 40.00     Types: Cigarettes     Smokeless tobacco: Never Used     Alcohol use No     Drug use: No     Sexual activity: Yes     Partners: Female     Other Topics Concern     Caffeine Concern No     Occupational Exposure No     Hobby Hazards No     Sleep Concern No     Stress Concern No     Weight Concern No     Special Diet No     Back Care No     Exercise Yes     Seat Belt Yes     Social History Narrative  "  Retired  [employer]         Allergies:   No known drug allergies         Review of Systems:  From intake questionnaire     Negative 14 system review except as noted on HPI, nurse's note.         Physical Exam:     Patient is a 66 year old  male   Vitals: Blood pressure 126/60, pulse 88, height 1.626 m (5' 4\"), weight 59.9 kg (132 lb).  General Appearance Adult: Body mass index is 22.66 kg/(m^2)., Alert, no acute distress, oriented  HENT: throat/mouth:normal, good dentition  Neck: No adenopathy,masses or thyromegaly  Lungs: no respiratory distress, or pursed lip breathing  Heart: No obvious jugular venous distension present  Abdomen: soft, nontender, no organomegaly or masses,   Lymphatics: No cervical or supraclavicular adenopathy  Musculoskeltal: extremities normal, no peripheral edema  Skin: no suspicious lesions or rashes  Neuro: Alert, oriented, speech and mentation normal  Psych: affect and mood normal  Gait: Normal  :Phallus unremarkable. Right testis palpable with no lesions. Left hemiscrotum is larger in appearance. Left testis palpable and is unremarkable without tenderness or lesions. Approximately 4-5 cm left spermatocele is noted; non-tender to palpation.      Labs and Pathology:    I reviewed all applicable laboratory and pathology data and went over findings with patient    Significant for   Lab Results   Component Value Date    CR 0.82 05/05/2017    CR 0.86 01/04/2016    CR 0.93 05/04/2013    CR 0.97 09/02/2010    CR 0.87 09/01/2009    CR 1.00 06/21/2007    CR 1.00 09/09/2005     PSA   Date Value Ref Range Status   05/05/2017 1.56 0 - 4 ug/L Final     Comment:     Assay Method:  Chemiluminescence using Siemens Vista analyzer   01/04/2016 4.33 (H) 0 - 4 ug/L Final   05/04/2013 1.13 0 - 4 ug/L Final   09/02/2010 1.45 0 - 4 ug/L Final   09/01/2009 1.68 0 - 4 ug/L Final         Outside and Past Medical records:    I spent 10 minutes reviewing outside and past medical records.    Scrotal US " 10/2016  IMPRESSION:    1. No evidence of testicular torsion or mass.  2. Large mildly complex left hydrocele. No right hydrocele or evidence  of varicoceles.         Assessment and Plan:     Assessment: Left spermatocele that has been slowly growing. Patient is nervous about the presence of this, but is not symptomatic. We discussed the benign nature of this finding and that any intervention would only be done on a purely elective basis if he were symptomatic or significantly bothered by spermatocele. Risks/benefits of surgical removal were discussed, including infection, bleeding, injury to surrounding structures, as well as possible anesthetic complications. Patient was actively weighing the pros and cons of intervention in the office today, but ultimately opted to have repeat ultrasound to ensure it has not changed in appearance or grown significantly from previous measurements, and then would likely opt for observation if ultrasound results prove reassuring. He asked many good questions today during our extensive discussion, and is agreeable with plan as detailed above.    Plan:  Scrotal ultrasound and clinic follow-up in 1 month    Orders  Orders Placed This Encounter   Procedures     [facility]ow-Operative Worksheet  (Urology General)     US Abdomen or Pelvis Doppler Limited     US Testicular & Scrotum w Doppler Ltd     Nghia Uriostegui MD  Urology  [facility]  Pager [phone]

## 2017-08-11 NOTE — LETTER
2017       RE: Nimco Knutson  [address] [place] [address]     Dear Colleague,    Thank you for referring your patient, Nimco Knutson, to the [facility]. Please see a copy of my visit note below.          Chief Complaint:   Hydrocele         Consult or Referral:     Mr. Nimco Knutson is a 66 year old male seen in consultation from Dr. Napier.         History of Present Illness:   Nimco Knutson is a very pleasant 66 year old male who presents with a history of hydroceles.  Slowly growing over 4 years. No pain. No significant LUTS. No UTIs. No hematuria. Patient is somewhat nervous and bothered by presence of spermatocele.         Past Medical History:     Past Medical History:   Diagnosis Date     Hepatitis B carrier (H)      Sciatica           Past Surgical History:     Past Surgical History:   Procedure Laterality Date     C HUNT W/O FACETEC FORAMOT/DSKC  VRT SEG, LUMBAR      L4-5 disc R          Medications     Current Outpatient Prescriptions   Medication     Ibuprofen (ADVIL PO)     No current facility-administered medications for this visit.             Family History:     Family History   Problem Relation Age of Onset     Family History Negative Father       at age 90     Family History Negative Mother      CEREBROVASCULAR DISEASE Brother      DIABETES No family hx of      Coronary Artery Disease No family hx of      Hypertension No family hx of      Hyperlipidemia No family hx of           Social History:     Social History     Social History     Marital status:      Spouse name: N/A     Number of children: 3     Years of education: N/A     Occupational History     Not on file.     Social History Main Topics     Smoking status: Current Every Day Smoker     Packs/day: 0.50     Years: 40.00     Types: Cigarettes     Smokeless tobacco: Never Used     Alcohol use No     Drug use: No  "    Sexual activity: Yes     Partners: Female     Other Topics Concern     Caffeine Concern No     Occupational Exposure No     Hobby Hazards No     Sleep Concern No     Stress Concern No     Weight Concern No     Special Diet No     Back Care No     Exercise Yes     Seat Belt Yes     Social History Narrative   Retired  [employer]         Allergies:   No known drug allergies         Review of Systems:  From intake questionnaire     Negative 14 system review except as noted on HPI, nurse's note.         Physical Exam:     Patient is a 66 year old  male   Vitals: Blood pressure 126/60, pulse 88, height 1.626 m (5' 4\"), weight 59.9 kg (132 lb).  General Appearance Adult: Body mass index is 22.66 kg/(m^2)., Alert, no acute distress, oriented  HENT: throat/mouth:normal, good dentition  Neck: No adenopathy,masses or thyromegaly  Lungs: no respiratory distress, or pursed lip breathing  Heart: No obvious jugular venous distension present  Abdomen: soft, nontender, no organomegaly or masses,   Lymphatics: No cervical or supraclavicular adenopathy  Musculoskeltal: extremities normal, no peripheral edema  Skin: no suspicious lesions or rashes  Neuro: Alert, oriented, speech and mentation normal  Psych: affect and mood normal  Gait: Normal  :Phallus unremarkable. Right testis palpable with no lesions. Left hemiscrotum is larger in appearance. Left testis palpable and is unremarkable without tenderness or lesions. Approximately 4-5 cm left spermatocele is noted; non-tender to palpation.      Labs and Pathology:    I reviewed all applicable laboratory and pathology data and went over findings with patient    Significant for   Lab Results   Component Value Date    CR 0.82 05/05/2017    CR 0.86 01/04/2016    CR 0.93 05/04/2013    CR 0.97 09/02/2010    CR 0.87 09/01/2009    CR 1.00 06/21/2007    CR 1.00 09/09/2005     PSA   Date Value Ref Range Status   05/05/2017 1.56 0 - 4 ug/L Final     Comment:     Assay Method:  " Chemiluminescence using Siemens Vista analyzer   01/04/2016 4.33 (H) 0 - 4 ug/L Final   05/04/2013 1.13 0 - 4 ug/L Final   09/02/2010 1.45 0 - 4 ug/L Final   09/01/2009 1.68 0 - 4 ug/L Final         Outside and Past Medical records:    I spent 10 minutes reviewing outside and past medical records.    Scrotal US 10/2016  IMPRESSION:    1. No evidence of testicular torsion or mass.  2. Large mildly complex left hydrocele. No right hydrocele or evidence  of varicoceles.         Assessment and Plan:     Assessment: Left spermatocele that has been slowly growing. Patient is nervous about the presence of this, but is not symptomatic. We discussed the benign nature of this finding and that any intervention would only be done on a purely elective basis if he were symptomatic or significantly bothered by spermatocele. Risks/benefits of surgical removal were discussed, including infection, bleeding, injury to surrounding structures, as well as possible anesthetic complications. Patient was actively weighing the pros and cons of intervention in the office today, but ultimately opted to have repeat ultrasound to ensure it has not changed in appearance or grown significantly from previous measurements, and then would likely opt for observation if ultrasound results prove reassuring. He asked many good questions today during our extensive discussion, and is agreeable with plan as detailed above.    Plan:  Scrotal ultrasound and clinic follow-up in 1 month    Orders  Orders Placed This Encounter   Procedures     Willow-Operative Worksheet  (Urology General)     US Abdomen or Pelvis Doppler Limited     US Testicular & Scrotum w Doppler Ltd     Nghia Uriostegui MD  Urology  [facility]  Pager [phone]

## 2017-08-11 NOTE — MR AVS SNAPSHOT
After Visit Summary   8/11/2017    Nimco Knutson    MRN: [id number]           Patient Information     Date Of Birth          1950        Visit Information        Provider Department      8/11/2017 3:30 PM Nghia Uriostegui MD [facility] [place]        Today's Diagnoses     Spermatocele    -  1       Follow-ups after your visit        Follow-up notes from your care team     Return in about 4 weeks (around 9/8/2017).      Your next 10 appointments already scheduled     Sep 12, 2017  1:30 PM CDT   US ABDOMEN/PELVIS DUPLEX LIMITED with [id number]   [facility] ([facility])    [address]  [facility] [id number] [phone]           Please bring a list of your medicines (including vitamins, minerals and over-the-counter drugs). Also, tell your doctor about any allergies you may have. Wear comfortable clothes and leave your valuables at home.  Adults: No eating or drinking for 8 hours before the exam. You may take medicine with a small sip of water.  Children: - Children 6+ years: No food or drink for 6 hours before exam. - Children 1-5 years: No food or drink for 4 hours before exam. - Infants, breast-fed: may have breast milk up to 2 hours before exam. - Infants, formula: may have bottle until 4 hours before exam.  Please call the Imaging Department at your exam site with any questions.            Sep 12, 2017  2:30 PM CDT   Return Visit with Nghia Uriostegui MD   [facility] ([facility])    [address]  [address]  [facility] [id number] [phone]              Future tests that were ordered for you today     Open Future Orders        Priority Expected Expires Ordered    US Abdomen or Pelvis Doppler Limited Routine 9/8/2017 8/11/2018 8/11/2017            Who to contact     If you have questions or  "need follow up information about today's clinic visit or your schedule please contact [facility] directly at [phone].  Normal or non-critical lab and imaging results will be communicated to you by MyChart, letter or phone within 4 business days after the clinic has received the results. If you do not hear from us within 7 days, please contact the clinic through [employer] or phone. If you have a critical or abnormal lab result, we will notify you by phone as soon as possible.  Submit refill requests through [employer] or call your pharmacy and they will forward the refill request to us. Please allow 3 business days for your refill to be completed.          Additional Information About Your Visit        [employer] Information     [employer] lets you send messages to your doctor, view your test results, renew your prescriptions, schedule appointments and more. To sign up, go to [email or web address] . Click on \"Log in\" on the left side of the screen, which will take you to the Welcome page. Then click on \"Sign up Now\" on the right side of the page.     You will be asked to enter the access code listed below, as well as some personal information. Please follow the directions to create your username and password.     Your access code is: [email or web address]  Expires: 2017  4:27 PM     Your access code will  in 90 days. If you need help or a new code, please call your [place] clinic or [phone].        Care EveryWhere ID     This is your Care EveryWhere ID. This could be used by other organizations to access your [place] medical records  [id number]        Your Vitals Were     Pulse Height BMI (Body Mass Index)             88 1.626 m (5' 4\") 22.66 kg/m2          Blood Pressure from Last 3 Encounters:   17 126/60   17 100/60   17 114/60    Weight from Last 3 Encounters:   17 59.9 kg (132 lb)   17 59.4 kg (131 lb)   17 61.3 kg (135 lb " 3.2 oz)              We Performed the Following     Willow-Operative Worksheet  (Urology General)        Primary Care Provider Office Phone # Fax #    Caleb Napier [phone]       [address]  [facility] [address]        Equal Access to Services     [facility]: Hadii aad ku hadasho Soomaali, waaxda luqadaha, qaybta kaalmada adeegyada, waxay idiin hayaan adeeg kharash la'aan . So [facility] [phone].    ATENCIÓN: Si habla español, tiene a pulido disposición servicios gratuitos de asistencia lingüística. Llame al [phone].    We comply with applicable federal civil rights laws and [place] laws. We do not discriminate on the basis of race, color, national origin, age, disability sex, sexual orientation or gender identity.            Thank you!     Thank you for choosing [facility] [place]  for your care. Our goal is always to provide you with excellent care. Hearing back from our patients is one way we can continue to improve our services. Please take a few minutes to complete the written survey that you may receive in the mail after your visit with us. Thank you!             Your Updated Medication List - Protect others around you: Learn how to safely use, store and throw away your medicines at www.disposemymeds.org.      Notice  As of 8/11/2017  4:29 PM    You have not been prescribed any medications.

## 2017-09-12 ENCOUNTER — HOSPITAL ENCOUNTER (OUTPATIENT)
Dept: ULTRASOUND IMAGING | Facility: CLINIC | Age: 67
Discharge: HOME | End: 2017-09-12
Attending: UROLOGY | Admitting: UROLOGY
Payer: MEDICARE

## 2017-09-12 ENCOUNTER — OFFICE VISIT (OUTPATIENT)
Dept: UROLOGY | Facility: CLINIC | Age: 67
End: 2017-09-12
Payer: COMMERCIAL

## 2017-09-12 VITALS
BODY MASS INDEX: 23.05 KG/M2 | DIASTOLIC BLOOD PRESSURE: 60 MMHG | HEIGHT: 64 IN | WEIGHT: 135 LBS | HEART RATE: 80 BPM | SYSTOLIC BLOOD PRESSURE: 100 MMHG

## 2017-09-12 DIAGNOSIS — N43.40 SPERMATOCELE: ICD-10-CM

## 2017-09-12 DIAGNOSIS — N43.40 SPERMATOCELE: Primary | ICD-10-CM

## 2017-09-12 PROCEDURE — 99213 OFFICE O/P EST LOW 20 MIN: CPT | Performed by: UROLOGY

## 2017-09-12 PROCEDURE — 93976 VASCULAR STUDY: CPT

## 2017-09-12 ASSESSMENT — PAIN SCALES - GENERAL: PAINLEVEL: NO PAIN (0)

## 2017-09-12 NOTE — LETTER
"9/12/2017       RE: Nimco Knutson  [address] [place] [address]     Dear Colleague,    Thank you for referring your patient, Nimco Knutson, to the [facility] at [facility]. Please see a copy of my visit note below.      CHIEF COMPLAINT   It was my pleasure to see Nimco Knutson who is a 66 year old male for follow-up of left spermatocele.      HPI   Nimco Knutson is a very pleasant 66 year old male who presents with a history of left spermatocele. Here for follow-up. Scrotal US as below. Patient with minimal symptoms. Mildly bothered by spermatocele. Planning 3 month trip to [facility] to visit family in November.    PHYSICAL EXAM  Vitals:    09/12/17 1432   BP: 100/60   Pulse: 80   Weight: 61.2 kg (135 lb)   Height: 1.626 m (5' 4\")     Constitutional: Alert, no acute distress  Psychiatric: Normal mood and affect  Abdomen: soft, non tender, non distended  : Left hemiscrotum with non-tender spermatocele noted - 5-6cm in diameter. Right hemiscrotum unremarkable.    IMPRESSION:    1. Normal testicles without torsion or mass. Two tiny simple-appearing  cysts are noted in the left testicle not definitely evident on prior  exam, but still appear to represent simple cysts. No further follow-up  required.  2. Large spermatocele or epididymal cyst in the left epididymal head  measuring 6.0 cm in maximal diameter, previously 4.9 cm. This has  increased in size since the prior study. Urologic follow-up as  clinically warranted.    ASSESSMENT and PLAN  66 year old male with left spermatocele. This is quite small at this point and is of minimal bother to patient. We discussed elective nature of surgery and risks/benefits of spermatocelectomy. He prefers to observe for now. Will follow-up in six months after he returns from [facility] to discuss surgery again.    - Follow-up in six months for symptom review.    I spent over 15 minutes with " the patient.  Over half this time was spent on counseling regarding spermatocele.    Nghia Uriotsegui MD  Urology  [facility]  Pager [phone]      Again, thank you for allowing me to participate in the care of your patient.      Sincerely,    Nghia Uriostegui MD

## 2017-09-12 NOTE — PROGRESS NOTES
"  CHIEF COMPLAINT   It was my pleasure to see Nimco Knutson who is a 66 year old male for follow-up of left spermatocele.      HPI   Nimco Knutson is a very pleasant 66 year old male who presents with a history of left spermatocele. Here for follow-up. Scrotal US as below. Patient with minimal symptoms. Mildly bothered by spermatocele. Planning 3 month trip to [facility] to visit family in November.    PHYSICAL EXAM  Vitals:    09/12/17 1432   BP: 100/60   Pulse: 80   Weight: 61.2 kg (135 lb)   Height: 1.626 m (5' 4\")     Constitutional: Alert, no acute distress  Psychiatric: Normal mood and affect  Abdomen: soft, non tender, non distended  : Left hemiscrotum with non-tender spermatocele noted - 5-6cm in diameter. Right hemiscrotum unremarkable.    IMPRESSION:    1. Normal testicles without torsion or mass. Two tiny simple-appearing  cysts are noted in the left testicle not definitely evident on prior  exam, but still appear to represent simple cysts. No further follow-up  required.  2. Large spermatocele or epididymal cyst in the left epididymal head  measuring 6.0 cm in maximal diameter, previously 4.9 cm. This has  increased in size since the prior study. Urologic follow-up as  clinically warranted.    ASSESSMENT and PLAN  66 year old male with left spermatocele. This is quite small at this point and is of minimal bother to patient. We discussed elective nature of surgery and risks/benefits of spermatocelectomy. He prefers to observe for now. Will follow-up in six months after he returns from [facility] to discuss surgery again.    - Follow-up in six months for symptom review.    I spent over 15 minutes with the patient.  Over half this time was spent on counseling regarding spermatocele.    Nghia Uriostegui MD  Urology  [facility] [place] [facility]  Pager [phone]    "

## 2017-09-12 NOTE — MR AVS SNAPSHOT
"              After Visit Summary   9/12/2017    Nimco Knutson    MRN: [id number]           Patient Information     Date Of Birth          1950        Visit Information        Provider Department      9/12/2017 2:30 PM Nghia Uriostegui MD [facility]         Follow-ups after your visit        Follow-up notes from your care team     Return in about 6 months (around 3/12/2018).      Your next 10 appointments already scheduled     Apr 03, 2018  1:30 PM CDT   Return Visit with Nghia Uriostegui MD   [facility] ([facility] [place])    [address]  [address]  [facility] [address] [phone]              Who to contact     If you have questions or need follow up information about today's clinic visit or your schedule please contact [facility] directly at [phone].  Normal or non-critical lab and imaging results will be communicated to you by [employer], letter or phone within 4 business days after the clinic has received the results. If you do not hear from us within 7 days, please contact the clinic through [employer] or phone. If you have a critical or abnormal lab result, we will notify you by phone as soon as possible.  Submit refill requests through Zhongheedu or call your pharmacy and they will forward the refill request to us. Please allow 3 business days for your refill to be completed.          Additional Information About Your Visit        [employer]Amprius Information     Zhongheedu lets you send messages to your doctor, view your test results, renew your prescriptions, schedule appointments and more. To sign up, go to [email or web address] . Click on \"Log in\" on the left side of the screen, which will take you to the Welcome page. Then click on \"Sign up Now\" on the right side of the page.     You will be asked to enter the access code " "listed below, as well as some personal information. Please follow the directions to create your username and password.     Your access code is: [email or web address]  Expires: 2017  4:27 PM     Your access code will  in 90 days. If you need help or a new code, please call your [place] clinic or [phone].        Care EveryWhere ID     This is your Care EveryWhere ID. This could be used by other organizations to access your [place] medical records  [id number]        Your Vitals Were     Pulse Height BMI (Body Mass Index)             80 1.626 m (5' 4\") 23.17 kg/m2          Blood Pressure from Last 3 Encounters:   17 100/60   17 126/60   17 100/60    Weight from Last 3 Encounters:   17 61.2 kg (135 lb)   17 59.9 kg (132 lb)   17 59.4 kg (131 lb)              Today, you had the following     No orders found for display       Primary Care Provider Office Phone # Fax #    Caleb Napier [phone]       [address] [facility] [address]        Equal Access to Services     KATIE HOLDER AH: Hadii aad ku hadasho Soomaali, waaxda luqadaha, qaybta kaalmada adeegyada, waxay idiin hayjohnnyn elena calvo lamariya . So [facility] [phone].    ATENCIÓN: Si habla español, tiene a pulido disposición servicios gratuitos de asistencia lingüística. Llame al [phone].    We comply with applicable federal civil rights laws and [place] laws. We do not discriminate on the basis of race, color, national origin, age, disability sex, sexual orientation or gender identity.            Thank you!     Thank you for choosing [facility] [place]  for your care. Our goal is always to provide you with excellent care. Hearing back from our patients is one way we can continue to improve our services. Please take a few minutes to complete the written survey that you may receive in the mail after your visit with us. Thank you!             Your Updated " Medication List - Protect others around you: Learn how to safely use, store and throw away your medicines at www.disposemymeds.org.      Notice  As of 9/12/2017  3:14 PM    You have not been prescribed any medications.

## 2018-05-13 ENCOUNTER — OFFICE VISIT (OUTPATIENT)
Dept: URGENT CARE | Facility: URGENT CARE | Age: 68
End: 2018-05-13
Payer: COMMERCIAL

## 2018-05-13 ENCOUNTER — RADIANT APPOINTMENT (OUTPATIENT)
Dept: GENERAL RADIOLOGY | Facility: CLINIC | Age: 68
End: 2018-05-13
Attending: PHYSICIAN ASSISTANT
Payer: COMMERCIAL

## 2018-05-13 VITALS
OXYGEN SATURATION: 98 % | SYSTOLIC BLOOD PRESSURE: 118 MMHG | HEIGHT: 61 IN | DIASTOLIC BLOOD PRESSURE: 62 MMHG | TEMPERATURE: 98.6 F | WEIGHT: 139.13 LBS | BODY MASS INDEX: 26.27 KG/M2 | HEART RATE: 86 BPM

## 2018-05-13 DIAGNOSIS — M54.41 ACUTE RIGHT-SIDED LOW BACK PAIN WITH RIGHT-SIDED SCIATICA: ICD-10-CM

## 2018-05-13 DIAGNOSIS — S13.9XXA NECK SPRAIN, INITIAL ENCOUNTER: Primary | ICD-10-CM

## 2018-05-13 PROCEDURE — 72040 X-RAY EXAM NECK SPINE 2-3 VW: CPT | Mod: FY

## 2018-05-13 PROCEDURE — 72100 X-RAY EXAM L-S SPINE 2/3 VWS: CPT | Mod: FY

## 2018-05-13 PROCEDURE — 99214 OFFICE O/P EST MOD 30 MIN: CPT | Performed by: PHYSICIAN ASSISTANT

## 2018-05-13 RX ORDER — MELOXICAM 7.5 MG/1
7.5 TABLET ORAL DAILY
Qty: 14 TABLET | Refills: 0 | Status: SHIPPED | OUTPATIENT
Start: 2018-05-13 | End: 2018-05-29

## 2018-05-13 RX ORDER — CYCLOBENZAPRINE HCL 10 MG
10 TABLET ORAL 3 TIMES DAILY PRN
Qty: 15 TABLET | Refills: 0 | Status: SHIPPED | OUTPATIENT
Start: 2018-05-13 | End: 2018-05-29

## 2018-05-13 NOTE — MR AVS SNAPSHOT
"              After Visit Summary   5/13/2018    Nimco Knutson    MRN: [id number]           Patient Information     Date Of Birth          1950        Visit Information        Provider Department      5/13/2018 12:10 PM Jag Casillas PA-C [facility]        Today's Diagnoses     Neck sprain, initial encounter    -  1    Acute right-sided low back pain with right-sided sciatica           Follow-ups after your visit        Your next 10 appointments already scheduled     May 15, 2018  3:30 PM CDT   Return Visit with Nghia Uriostegui MD   [facility] [place] ([facility] [place])    [address]  [address]  [facility] [address] [phone]              Who to contact     If you have questions or need follow up information about today's clinic visit or your schedule please contact [facility] directly at [phone].  Normal or non-critical lab and imaging results will be communicated to you by Ezuzahart, letter or phone within 4 business days after the clinic has received the results. If you do not hear from us within 7 days, please contact the clinic through Ezuzahart or phone. If you have a critical or abnormal lab result, we will notify you by phone as soon as possible.  Submit refill requests through GroupZoom or call your pharmacy and they will forward the refill request to us. Please allow 3 business days for your refill to be completed.          Additional Information About Your Visit        Ezuzahart Information     GroupZoom lets you send messages to your doctor, view your test results, renew your prescriptions, schedule appointments and more. To sign up, go to [email or web address] . Click on \"Log in\" on the left side of the screen, which will take you to the Welcome page. Then click on \"Sign up Now\" on the right side of the page.     You will be asked to enter the access code " "listed below, as well as some personal information. Please follow the directions to create your username and password.     Your access code is: [email or web address]  Expires: 2018  2:26 PM     Your access code will  in 90 days. If you need help or a new code, please call your [place] clinic or [phone].        Care EveryWhere ID     This is your Care EveryWhere ID. This could be used by other organizations to access your [place] medical records  [id number]        Your Vitals Were     Pulse Temperature Height Pulse Oximetry BMI (Body Mass Index)       86 98.6  F (37  C) (Oral) 5' 0.5\" (1.537 m) 98% 26.72 kg/m2        Blood Pressure from Last 3 Encounters:   18 118/62   17 100/60   17 126/60    Weight from Last 3 Encounters:   18 139 lb 2 oz (63.1 kg)   17 135 lb (61.2 kg)   17 132 lb (59.9 kg)              We Performed the Following     XR Cervical Spine Port 2/3 Views     XR Lumbar Spine Port 2/3 Views          Today's Medication Changes          These changes are accurate as of 18  3:46 PM.  If you have any questions, ask your nurse or doctor.               Start taking these medicines.        Dose/Directions    cyclobenzaprine 10 MG tablet   Commonly known as:  FLEXERIL   Used for:  Neck sprain, initial encounter, Acute right-sided low back pain with right-sided sciatica   Started by:  Jag Casillas PA-C        Dose:  10 mg   Take 1 tablet (10 mg) by mouth 3 times daily as needed for muscle spasms   Quantity:  15 tablet   Refills:  0       meloxicam 7.5 MG tablet   Commonly known as:  MOBIC   Used for:  Acute right-sided low back pain with right-sided sciatica, Neck sprain, initial encounter   Started by:  Jag Casillas PA-C        Dose:  7.5 mg   Take 1 tablet (7.5 mg) by mouth daily for 14 days   Quantity:  14 tablet   Refills:  0            Where to get your medicines      Some of these will need a paper prescription and others can be bought over the " counter.  Ask your nurse if you have questions.     Bring a paper prescription for each of these medications     cyclobenzaprine 10 MG tablet    meloxicam 7.5 MG tablet                Primary Care Provider Office Phone # Fax #    Caleb Napier [phone]       [address]  [facility] [address]        Equal Access to Services     KATIE HOLDER : Hadii aad ku hadasho Soomaali, waaxda luqadaha, qaybta kaalmada adeegyada, waxay idiin hayaan adeeg kharash lalambertn . So [facility] [phone].    ATENCIÓN: Si habla español, tiene a pulido disposición servicios gratuitos de asistencia lingüística. Llame al [phone].    We comply with applicable federal civil rights laws and [place] laws. We do not discriminate on the basis of race, color, national origin, age, disability, sex, sexual orientation, or gender identity.            Thank you!     Thank you for choosing [facility]  for your care. Our goal is always to provide you with excellent care. Hearing back from our patients is one way we can continue to improve our services. Please take a few minutes to complete the written survey that you may receive in the mail after your visit with us. Thank you!             Your Updated Medication List - Protect others around you: Learn how to safely use, store and throw away your medicines at www.disposemymeds.org.          This list is accurate as of 5/13/18  3:46 PM.  Always use your most recent med list.                   Brand Name Dispense Instructions for use Diagnosis    cyclobenzaprine 10 MG tablet    FLEXERIL    15 tablet    Take 1 tablet (10 mg) by mouth 3 times daily as needed for muscle spasms    Neck sprain, initial encounter, Acute right-sided low back pain with right-sided sciatica       meloxicam 7.5 MG tablet    MOBIC    14 tablet    Take 1 tablet (7.5 mg) by mouth daily for 14 days    Acute right-sided low back pain with right-sided sciatica, Neck sprain, initial  encounter
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
## 2018-05-15 ENCOUNTER — OFFICE VISIT (OUTPATIENT)
Dept: UROLOGY | Facility: CLINIC | Age: 68
End: 2018-05-15
Payer: COMMERCIAL

## 2018-05-15 VITALS — BODY MASS INDEX: 23.73 KG/M2 | HEART RATE: 84 BPM | HEIGHT: 64 IN | OXYGEN SATURATION: 97 % | WEIGHT: 139 LBS

## 2018-05-15 DIAGNOSIS — N43.40 SPERMATOCELE: Primary | ICD-10-CM

## 2018-05-15 PROCEDURE — 99213 OFFICE O/P EST LOW 20 MIN: CPT | Performed by: UROLOGY

## 2018-05-15 RX ORDER — CEFAZOLIN SODIUM 1 G
1 VIAL (EA) INJECTION SEE ADMIN INSTRUCTIONS
Status: CANCELLED | OUTPATIENT
Start: 2018-05-15

## 2018-05-15 ASSESSMENT — PAIN SCALES - GENERAL: PAINLEVEL: NO PAIN (0)

## 2018-05-15 NOTE — LETTER
"5/15/2018      RE: Nimco Knutson  [address] [place] [place] [address]         CHIEF COMPLAINT   It was my pleasure to see Nimco Knutson who is a 67 year old male for follow-up of spermatocele.    HPI   Nimco Knutson is a very pleasant 67 year old male who presents with a history of left spermatocele. Notes it does has not grown appreciably and causes mild discomfort, but is not painful.    Scrotal US 9/2017  IMPRESSION:    1. Normal testicles without torsion or mass. Two tiny simple-appearing  cysts are noted in the left testicle not definitely evident on prior  exam, but still appear to represent simple cysts. No further follow-up  required.  2. Large spermatocele or epididymal cyst in the left epididymal head  measuring 6.0 cm in maximal diameter, previously 4.9 cm. This has  increased in size since the prior study. Urologic follow-up as  clinically warranted.    PHYSICAL EXAM  Patient is a 67 year old  male   Vitals: Pulse 84, height 1.626 m (5' 4\"), weight 63 kg (139 lb), SpO2 97 %.  General Appearance Adult: Body mass index is 23.86 kg/(m^2).  Alert, no acute distress, oriented  HENT: throat/mouth:normal, good dentition  Lungs: no respiratory distress, or pursed lip breathing  Heart: No obvious jugular venous distension present  Abdomen: soft, nontender, no organomegaly or masses  Musculoskeltal: extremities normal, no peripheral edema  Skin: no suspicious lesions or rashes  Neuro: Alert, oriented, speech and mentation normal  Psych: affect and mood normal  Gait: Normal  : Right hemiscrotum unremarkable. Left-sided spermatocele noted. No tenderness noted. No testicular lesions palpated.    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 Discussed option to treat this electively with spermatocelectomy if he is having pain or bother with this. The risks/benefits of spermatocelectomy were discussed in detail today. Risks of bleeding, infection, hematoma, injury to surrounding structures were " discussed. Questions were answered. Patient states he is bothered by his spermatocele and is anxious about it growing. As such, he would like to proceed with surgery.    - Schedule for left spermatocelectomy    I spent over 15 minutes with the patient.  Over half this time was spent on counseling regarding speramtocele.    Nghia Uriostegui MD  Urology  [facility]  Clinic Phone [phone]        Nghia Uriostegui MD

## 2018-05-15 NOTE — NURSING NOTE
Pt does not have mediction coverage.  Pt here to talk.  Pt denies any voiding trouble.  LUZ Naranjo, CMA

## 2018-05-15 NOTE — MR AVS SNAPSHOT
"              After Visit Summary   5/15/2018    Nimco Knutson    MRN: [id number]           Patient Information     Date Of Birth          1950        Visit Information        Provider Department      5/15/2018 3:30 PM Nghia Uriostegui MD [facility]        Today's Diagnoses     Spermatocele    -  1       Follow-ups after your visit        Your next 10 appointments already scheduled     Jun 05, 2018   Procedure with Nghia Uriostegui MD   [facility] (--)    [address] [place]  [facility] [address]   [phone]            Jun 26, 2018 11:00 AM CDT   Post-Op with Nghia Uriostegui MD   [facility] ([facility] [place])    [address]  [address]  [facility] [id number] [phone]              Who to contact     If you have questions or need follow up information about today's clinic visit or your schedule please contact [facility]OLOG[facility] directly at [phone].  Normal or non-critical lab and imaging results will be communicated to you by SafetyCertifiedhart, letter or phone within 4 business days after the clinic has received the results. If you do not hear from us within 7 days, please contact the clinic through SafetyCertifiedhart or phone. If you have a critical or abnormal lab result, we will notify you by phone as soon as possible.  Submit refill requests through JumpChat or call your pharmacy and they will forward the refill request to us. Please allow 3 business days for your refill to be completed.          Additional Information About Your Visit        MyChart Information     JumpChat lets you send messages to your doctor, view your test results, renew your prescriptions, schedule appointments and more. To sign up, go to [email or web address] . Click on \"Log in\" on the left side of the screen, which will take " "you to the Welcome page. Then click on \"Sign up Now\" on the right side of the page.     You will be asked to enter the access code listed below, as well as some personal information. Please follow the directions to create your username and password.     Your access code is: [email or web address]  Expires: 2018  2:26 PM     Your access code will  in 90 days. If you need help or a new code, please call your [place] clinic or [phone].        Care EveryWhere ID     This is your Care EveryWhere ID. This could be used by other organizations to access your [place] medical records  [id number]        Your Vitals Were     Pulse Height Pulse Oximetry BMI (Body Mass Index)          84 1.626 m (5' 4\") 97% 23.86 kg/m2         Blood Pressure from Last 3 Encounters:   18 118/62   17 100/60   17 126/60    Weight from Last 3 Encounters:   05/15/18 63 kg (139 lb)   18 63.1 kg (139 lb 2 oz)   17 61.2 kg (135 lb)              We Performed the Following     [facility] Worksheet  (Urology General)        Primary Care Provider Office Phone # Fax #    Caleb Napier [phone]       [address] [facility] [address]        Equal Access to Services     [facility]: Hadii aad ku hadasho Soomaali, waaxda luqadaha, qaybta kaalmada adeegyada, tania loyola haybruno lopez . So [facility] [phone].    ATENCIÓN: Si habla español, tiene a pulido disposición servicios gratuitos de asistencia lingüística. Llame al [phone].    We comply with applicable federal civil rights laws and [place] laws. We do not discriminate on the basis of race, color, national origin, age, disability, sex, sexual orientation, or gender identity.            Thank you!     Thank you for choosing [facility] [place]  for your care. Our goal is always to provide you with excellent care. Hearing back from our patients is one way we can continue to improve " our services. Please take a few minutes to complete the written survey that you may receive in the mail after your visit with us. Thank you!             Your Updated Medication List - Protect others around you: Learn how to safely use, store and throw away your medicines at www.disposemymeds.org.          This list is accurate as of 5/15/18 11:59 PM.  Always use your most recent med list.                   Brand Name Dispense Instructions for use Diagnosis    cyclobenzaprine 10 MG tablet    FLEXERIL    15 tablet    Take 1 tablet (10 mg) by mouth 3 times daily as needed for muscle spasms    Neck sprain, initial encounter, Acute right-sided low back pain with right-sided sciatica       meloxicam 7.5 MG tablet    MOBIC    14 tablet    Take 1 tablet (7.5 mg) by mouth daily for 14 days    Acute right-sided low back pain with right-sided sciatica, Neck sprain, initial encounter

## 2018-05-15 NOTE — PROGRESS NOTES
"  CHIEF COMPLAINT   It was my pleasure to see Nimco Knutson who is a 67 year old male for follow-up of spermatocele.    HPI   Nimco Knutson is a very pleasant 67 year old male who presents with a history of left spermatocele. Notes it does has not grown appreciably and causes mild discomfort, but is not painful.    Scrotal US 9/2017  IMPRESSION:    1. Normal testicles without torsion or mass. Two tiny simple-appearing  cysts are noted in the left testicle not definitely evident on prior  exam, but still appear to represent simple cysts. No further follow-up  required.  2. Large spermatocele or epididymal cyst in the left epididymal head  measuring 6.0 cm in maximal diameter, previously 4.9 cm. This has  increased in size since the prior study. Urologic follow-up as  clinically warranted.    PHYSICAL EXAM  Patient is a 67 year old  male   Vitals: Pulse 84, height 1.626 m (5' 4\"), weight 63 kg (139 lb), SpO2 97 %.  General Appearance Adult: Body mass index is 23.86 kg/(m^2).  Alert, no acute distress, oriented  HENT: throat/mouth:normal, good dentition  Lungs: no respiratory distress, or pursed lip breathing  Heart: No obvious jugular venous distension present  Abdomen: soft, nontender, no organomegaly or masses  Musculoskeltal: extremities normal, no peripheral edema  Skin: no suspicious lesions or rashes  Neuro: Alert, oriented, speech and mentation normal  Psych: affect and mood normal  Gait: Normal  : Right hemiscrotum unremarkable. Left-sided spermatocele noted. No tenderness noted. No testicular lesions palpated.    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 Discussed option to treat this electively with spermatocelectomy if he is having pain or bother with this. The risks/benefits of spermatocelectomy were discussed in detail today. Risks of bleeding, infection, hematoma, injury to surrounding structures were discussed. Questions were answered. Patient states he is bothered by his " spermatocele and is anxious about it growing. As such, he would like to proceed with surgery.    - Schedule for left spermatocelectomy    I spent over 15 minutes with the patient.  Over half this time was spent on counseling regarding speramtocele.    Nghia Uriostegui MD  Urology  [facility]  Clinic Phone [phone]

## 2018-05-15 NOTE — LETTER
"5/15/2018       RE: Nimco Knutson  [address] [place] [place] [address]     Dear Colleague,    Thank you for referring your patient, Nimco Knutson, to the [facility] at [facility]. Please see a copy of my visit note below.      CHIEF COMPLAINT   It was my pleasure to see Nimco Knutson who is a 67 year old male for follow-up of spermatocele.    HPI   Nimco Knutson is a very pleasant 67 year old male who presents with a history of left spermatocele. Notes it does has not grown appreciably and causes mild discomfort, but is not painful.    Scrotal US 9/2017  IMPRESSION:    1. Normal testicles without torsion or mass. Two tiny simple-appearing  cysts are noted in the left testicle not definitely evident on prior  exam, but still appear to represent simple cysts. No further follow-up  required.  2. Large spermatocele or epididymal cyst in the left epididymal head  measuring 6.0 cm in maximal diameter, previously 4.9 cm. This has  increased in size since the prior study. Urologic follow-up as  clinically warranted.    PHYSICAL EXAM  Patient is a 67 year old  male   Vitals: Pulse 84, height 1.626 m (5' 4\"), weight 63 kg (139 lb), SpO2 97 %.  General Appearance Adult: Body mass index is 23.86 kg/(m^2).  Alert, no acute distress, oriented  HENT: throat/mouth:normal, good dentition  Lungs: no respiratory distress, or pursed lip breathing  Heart: No obvious jugular venous distension present  Abdomen: soft, nontender, no organomegaly or masses  Musculoskeltal: extremities normal, no peripheral edema  Skin: no suspicious lesions or rashes  Neuro: Alert, oriented, speech and mentation normal  Psych: affect and mood normal  Gait: Normal  : Right hemiscrotum unremarkable. Left-sided spermatocele noted. No tenderness noted. No testicular lesions palpated.    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 Discussed option to treat " this electively with spermatocelectomy if he is having pain or bother with this. The risks/benefits of spermatocelectomy were discussed in detail today. Risks of bleeding, infection, hematoma, injury to surrounding structures were discussed. Questions were answered. Patient states he is bothered by his spermatocele and is anxious about it growing. As such, he would like to proceed with surgery.    - Schedule for left spermatocelectomy    I spent over 15 minutes with the patient.  Over half this time was spent on counseling regarding speramtocele.    Nghia Uriostegui MD  Urology  [facility]  Clinic Phone [phone]

## 2018-06-04 NOTE — H&P (VIEW-ONLY)
"  CHIEF COMPLAINT   It was my pleasure to see Nimco Knutson who is a 67 year old male for follow-up of spermatocele.    HPI   Nimco Knutson is a very pleasant 67 year old male who presents with a history of left spermatocele. Notes it does has not grown appreciably and causes mild discomfort, but is not painful.    Scrotal US 9/2017  IMPRESSION:    1. Normal testicles without torsion or mass. Two tiny simple-appearing  cysts are noted in the left testicle not definitely evident on prior  exam, but still appear to represent simple cysts. No further follow-up  required.  2. Large spermatocele or epididymal cyst in the left epididymal head  measuring 6.0 cm in maximal diameter, previously 4.9 cm. This has  increased in size since the prior study. Urologic follow-up as  clinically warranted.    PHYSICAL EXAM  Patient is a 67 year old  male   Vitals: Pulse 84, height 1.626 m (5' 4\"), weight 63 kg (139 lb), SpO2 97 %.  General Appearance Adult: Body mass index is 23.86 kg/(m^2).  Alert, no acute distress, oriented  HENT: throat/mouth:normal, good dentition  Lungs: no respiratory distress, or pursed lip breathing  Heart: No obvious jugular venous distension present  Abdomen: soft, nontender, no organomegaly or masses  Musculoskeltal: extremities normal, no peripheral edema  Skin: no suspicious lesions or rashes  Neuro: Alert, oriented, speech and mentation normal  Psych: affect and mood normal  Gait: Normal  : Right hemiscrotum unremarkable. Left-sided spermatocele noted. No tenderness noted. No testicular lesions palpated.    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 Discussed option to treat this electively with spermatocelectomy if he is having pain or bother with this. The risks/benefits of spermatocelectomy were discussed in detail today. Risks of bleeding, infection, hematoma, injury to surrounding structures were discussed. Questions were answered. Patient states he is bothered by his " spermatocele and is anxious about it growing. As such, he would like to proceed with surgery.    - Schedule for left spermatocelectomy    I spent over 15 minutes with the patient.  Over half this time was spent on counseling regarding speramtocele.    Nghia Uriostegui MD  Urology  [facility]  Clinic Phone [phone]

## 2018-06-05 ENCOUNTER — SURGERY (OUTPATIENT)
Age: 68
End: 2018-06-05

## 2018-06-05 ENCOUNTER — ANESTHESIA (OUTPATIENT)
Dept: SURGERY | Facility: CLINIC | Age: 68
End: 2018-06-05
Payer: MEDICARE

## 2018-06-05 ENCOUNTER — HOSPITAL ENCOUNTER (OUTPATIENT)
Facility: CLINIC | Age: 68
Discharge: HOME | End: 2018-06-05
Attending: UROLOGY | Admitting: UROLOGY
Payer: MEDICARE

## 2018-06-05 ENCOUNTER — ANESTHESIA EVENT (OUTPATIENT)
Dept: SURGERY | Facility: CLINIC | Age: 68
End: 2018-06-05
Payer: MEDICARE

## 2018-06-05 VITALS
BODY MASS INDEX: 23.56 KG/M2 | HEIGHT: 64 IN | HEART RATE: 82 BPM | WEIGHT: 138 LBS | OXYGEN SATURATION: 98 % | RESPIRATION RATE: 16 BRPM | DIASTOLIC BLOOD PRESSURE: 83 MMHG | TEMPERATURE: 97.6 F | SYSTOLIC BLOOD PRESSURE: 124 MMHG

## 2018-06-05 DIAGNOSIS — N43.40 SPERMATOCELE: ICD-10-CM

## 2018-06-05 PROCEDURE — 36000050 ZZH SURGERY LEVEL 2 1ST 30 MIN: Performed by: UROLOGY

## 2018-06-05 PROCEDURE — 27210995 ZZH RX 272: Performed by: UROLOGY

## 2018-06-05 PROCEDURE — 36000052 ZZH SURGERY LEVEL 2 EA 15 ADDTL MIN: Performed by: UROLOGY

## 2018-06-05 PROCEDURE — 71000027 ZZH RECOVERY PHASE 2 EACH 15 MINS: Performed by: UROLOGY

## 2018-06-05 PROCEDURE — 37000008 ZZH ANESTHESIA TECHNICAL FEE, 1ST 30 MIN: Performed by: UROLOGY

## 2018-06-05 PROCEDURE — 88304 TISSUE EXAM BY PATHOLOGIST: CPT | Mod: 26 | Performed by: UROLOGY

## 2018-06-05 PROCEDURE — 88304 TISSUE EXAM BY PATHOLOGIST: CPT | Performed by: UROLOGY

## 2018-06-05 PROCEDURE — 25000128 H RX IP 250 OP 636: Performed by: ANESTHESIOLOGY

## 2018-06-05 PROCEDURE — 25000125 ZZHC RX 250

## 2018-06-05 PROCEDURE — 25000566 ZZH SEVOFLURANE, EA 15 MIN: Performed by: UROLOGY

## 2018-06-05 PROCEDURE — 25000128 H RX IP 250 OP 636

## 2018-06-05 PROCEDURE — 40000306 ZZH STATISTIC PRE PROC ASSESS II: Performed by: UROLOGY

## 2018-06-05 PROCEDURE — 25000125 ZZHC RX 250: Performed by: UROLOGY

## 2018-06-05 PROCEDURE — 54840 REMOVE EPIDIDYMIS LESION: CPT | Mod: LT | Performed by: UROLOGY

## 2018-06-05 PROCEDURE — 71000012 ZZH RECOVERY PHASE 1 LEVEL 1 FIRST HR: Performed by: UROLOGY

## 2018-06-05 PROCEDURE — 25000128 H RX IP 250 OP 636: Performed by: UROLOGY

## 2018-06-05 PROCEDURE — 27210794 ZZH OR GENERAL SUPPLY STERILE: Performed by: UROLOGY

## 2018-06-05 PROCEDURE — 37000009 ZZH ANESTHESIA TECHNICAL FEE, EACH ADDTL 15 MIN: Performed by: UROLOGY

## 2018-06-05 RX ORDER — OXYCODONE HYDROCHLORIDE 5 MG/1
5-10 TABLET ORAL
Qty: 15 TABLET | Refills: 0 | Status: SHIPPED | OUTPATIENT
Start: 2018-06-05 | End: 2018-09-17

## 2018-06-05 RX ORDER — AMOXICILLIN 250 MG
1-2 CAPSULE ORAL 2 TIMES DAILY PRN
Qty: 20 TABLET | Refills: 0 | Status: SHIPPED | OUTPATIENT
Start: 2018-06-05 | End: 2018-09-17

## 2018-06-05 RX ORDER — MAGNESIUM HYDROXIDE 1200 MG/15ML
LIQUID ORAL PRN
Status: DISCONTINUED | OUTPATIENT
Start: 2018-06-05 | End: 2018-06-05 | Stop reason: HOSPADM

## 2018-06-05 RX ORDER — HYDRALAZINE HYDROCHLORIDE 20 MG/ML
2.5-5 INJECTION INTRAMUSCULAR; INTRAVENOUS EVERY 10 MIN PRN
Status: DISCONTINUED | OUTPATIENT
Start: 2018-06-05 | End: 2018-06-05 | Stop reason: HOSPADM

## 2018-06-05 RX ORDER — LIDOCAINE 40 MG/G
CREAM TOPICAL
Status: DISCONTINUED | OUTPATIENT
Start: 2018-06-05 | End: 2018-06-05 | Stop reason: HOSPADM

## 2018-06-05 RX ORDER — HYDROMORPHONE HYDROCHLORIDE 1 MG/ML
.3-.5 INJECTION, SOLUTION INTRAMUSCULAR; INTRAVENOUS; SUBCUTANEOUS EVERY 10 MIN PRN
Status: DISCONTINUED | OUTPATIENT
Start: 2018-06-05 | End: 2018-06-05 | Stop reason: HOSPADM

## 2018-06-05 RX ORDER — ONDANSETRON 4 MG/1
4 TABLET, ORALLY DISINTEGRATING ORAL EVERY 30 MIN PRN
Status: DISCONTINUED | OUTPATIENT
Start: 2018-06-05 | End: 2018-06-05 | Stop reason: HOSPADM

## 2018-06-05 RX ORDER — BUPIVACAINE HYDROCHLORIDE 5 MG/ML
INJECTION, SOLUTION EPIDURAL; INTRACAUDAL PRN
Status: DISCONTINUED | OUTPATIENT
Start: 2018-06-05 | End: 2018-06-05 | Stop reason: HOSPADM

## 2018-06-05 RX ORDER — GINSENG 100 MG
CAPSULE ORAL 2 TIMES DAILY
Qty: 30 G | Refills: 0 | Status: SHIPPED | OUTPATIENT
Start: 2018-06-05 | End: 2018-06-10

## 2018-06-05 RX ORDER — FENTANYL CITRATE 50 UG/ML
25-50 INJECTION, SOLUTION INTRAMUSCULAR; INTRAVENOUS
Status: DISCONTINUED | OUTPATIENT
Start: 2018-06-05 | End: 2018-06-05 | Stop reason: HOSPADM

## 2018-06-05 RX ORDER — DEXAMETHASONE SODIUM PHOSPHATE 4 MG/ML
INJECTION, SOLUTION INTRA-ARTICULAR; INTRALESIONAL; INTRAMUSCULAR; INTRAVENOUS; SOFT TISSUE PRN
Status: DISCONTINUED | OUTPATIENT
Start: 2018-06-05 | End: 2018-06-05

## 2018-06-05 RX ORDER — BACITRACIN ZINC 500 [USP'U]/G
OINTMENT TOPICAL PRN
Status: DISCONTINUED | OUTPATIENT
Start: 2018-06-05 | End: 2018-06-05 | Stop reason: HOSPADM

## 2018-06-05 RX ORDER — ONDANSETRON 2 MG/ML
INJECTION INTRAMUSCULAR; INTRAVENOUS PRN
Status: DISCONTINUED | OUTPATIENT
Start: 2018-06-05 | End: 2018-06-05

## 2018-06-05 RX ORDER — CEFAZOLIN SODIUM 2 G/100ML
2 INJECTION, SOLUTION INTRAVENOUS
Status: COMPLETED | OUTPATIENT
Start: 2018-06-05 | End: 2018-06-05

## 2018-06-05 RX ORDER — CEFAZOLIN SODIUM 1 G/3ML
1 INJECTION, POWDER, FOR SOLUTION INTRAMUSCULAR; INTRAVENOUS SEE ADMIN INSTRUCTIONS
Status: DISCONTINUED | OUTPATIENT
Start: 2018-06-05 | End: 2018-06-05 | Stop reason: HOSPADM

## 2018-06-05 RX ORDER — FENTANYL CITRATE 50 UG/ML
INJECTION, SOLUTION INTRAMUSCULAR; INTRAVENOUS PRN
Status: DISCONTINUED | OUTPATIENT
Start: 2018-06-05 | End: 2018-06-05

## 2018-06-05 RX ORDER — KETOROLAC TROMETHAMINE 30 MG/ML
INJECTION, SOLUTION INTRAMUSCULAR; INTRAVENOUS PRN
Status: DISCONTINUED | OUTPATIENT
Start: 2018-06-05 | End: 2018-06-05

## 2018-06-05 RX ORDER — PROPOFOL 10 MG/ML
INJECTION, EMULSION INTRAVENOUS PRN
Status: DISCONTINUED | OUTPATIENT
Start: 2018-06-05 | End: 2018-06-05

## 2018-06-05 RX ORDER — NALOXONE HYDROCHLORIDE 0.4 MG/ML
.1-.4 INJECTION, SOLUTION INTRAMUSCULAR; INTRAVENOUS; SUBCUTANEOUS
Status: DISCONTINUED | OUTPATIENT
Start: 2018-06-05 | End: 2018-06-05 | Stop reason: HOSPADM

## 2018-06-05 RX ORDER — MEPERIDINE HYDROCHLORIDE 50 MG/ML
12.5 INJECTION INTRAMUSCULAR; INTRAVENOUS; SUBCUTANEOUS
Status: DISCONTINUED | OUTPATIENT
Start: 2018-06-05 | End: 2018-06-05 | Stop reason: HOSPADM

## 2018-06-05 RX ORDER — LIDOCAINE HYDROCHLORIDE 10 MG/ML
INJECTION, SOLUTION INFILTRATION; PERINEURAL PRN
Status: DISCONTINUED | OUTPATIENT
Start: 2018-06-05 | End: 2018-06-05

## 2018-06-05 RX ORDER — ONDANSETRON 2 MG/ML
4 INJECTION INTRAMUSCULAR; INTRAVENOUS EVERY 30 MIN PRN
Status: DISCONTINUED | OUTPATIENT
Start: 2018-06-05 | End: 2018-06-05 | Stop reason: HOSPADM

## 2018-06-05 RX ORDER — SODIUM CHLORIDE, SODIUM LACTATE, POTASSIUM CHLORIDE, CALCIUM CHLORIDE 600; 310; 30; 20 MG/100ML; MG/100ML; MG/100ML; MG/100ML
INJECTION, SOLUTION INTRAVENOUS CONTINUOUS
Status: DISCONTINUED | OUTPATIENT
Start: 2018-06-05 | End: 2018-06-05 | Stop reason: HOSPADM

## 2018-06-05 RX ADMIN — PROPOFOL 140 MG: 10 INJECTION, EMULSION INTRAVENOUS at 07:28

## 2018-06-05 RX ADMIN — ONDANSETRON 4 MG: 2 INJECTION INTRAMUSCULAR; INTRAVENOUS at 07:30

## 2018-06-05 RX ADMIN — DEXAMETHASONE SODIUM PHOSPHATE 4 MG: 4 INJECTION, SOLUTION INTRA-ARTICULAR; INTRALESIONAL; INTRAMUSCULAR; INTRAVENOUS; SOFT TISSUE at 07:29

## 2018-06-05 RX ADMIN — KETOROLAC TROMETHAMINE 30 MG: 30 INJECTION, SOLUTION INTRAMUSCULAR at 07:30

## 2018-06-05 RX ADMIN — CEFAZOLIN SODIUM 2 G: 2 INJECTION, SOLUTION INTRAVENOUS at 07:25

## 2018-06-05 RX ADMIN — FENTANYL CITRATE 100 MCG: 50 INJECTION, SOLUTION INTRAMUSCULAR; INTRAVENOUS at 07:28

## 2018-06-05 RX ADMIN — MIDAZOLAM 2 MG: 1 INJECTION INTRAMUSCULAR; INTRAVENOUS at 07:30

## 2018-06-05 RX ADMIN — LIDOCAINE HYDROCHLORIDE 50 MG: 10 INJECTION, SOLUTION INFILTRATION; PERINEURAL at 07:28

## 2018-06-05 RX ADMIN — SODIUM CHLORIDE, POTASSIUM CHLORIDE, SODIUM LACTATE AND CALCIUM CHLORIDE: 600; 310; 30; 20 INJECTION, SOLUTION INTRAVENOUS at 09:08

## 2018-06-05 RX ADMIN — PHENYLEPHRINE HYDROCHLORIDE 100 MCG: 10 INJECTION, SOLUTION INTRAMUSCULAR; INTRAVENOUS; SUBCUTANEOUS at 07:30

## 2018-06-05 RX ADMIN — BUPIVACAINE HYDROCHLORIDE 10 ML: 5 INJECTION, SOLUTION EPIDURAL; INTRACAUDAL at 08:35

## 2018-06-05 RX ADMIN — SODIUM CHLORIDE, POTASSIUM CHLORIDE, SODIUM LACTATE AND CALCIUM CHLORIDE: 600; 310; 30; 20 INJECTION, SOLUTION INTRAVENOUS at 06:56

## 2018-06-05 RX ADMIN — BACITRACIN ZINC 1 G: 500 OINTMENT TOPICAL at 08:28

## 2018-06-05 RX ADMIN — HYDROCODONE BITARTRATE AND ACETAMINOPHEN 25 ML: 500; 5 TABLET ORAL at 08:15

## 2018-06-05 NOTE — ANESTHESIA CARE TRANSFER NOTE
Patient: Nimco Knutson    Procedure(s):  LEFT SPERMATOCELECTOMY  - Wound Class: II-Clean Contaminated    Diagnosis: Spermatocele  Diagnosis Additional Information: No value filed.    Anesthesia Type:   General, LMA     Note:  Airway :Face Mask  Patient transferred to:PACU  Comments: Pt to PACU, VSS, report to RN      Vitals: (Last set prior to Anesthesia Care Transfer)    CRNA VITALS  6/5/2018 0814 - 6/5/2018 0851      6/5/2018             SpO2: 99 %    Resp Rate (observed): (!)  2                Electronically Signed By: RANDY Landers CRNA  June 5, 2018  8:51 AM

## 2018-06-05 NOTE — OP NOTE
DATE OF SERVICE: 6/5/2018  PREOPERATIVE DIAGNOSIS: Left Spermatocele  POSTOPERATIVE DIAGNOSIS:Left Spermatocele    PROCEDURES PERFORMED:   1. Left spermatocelectomy    STAFF SURGEON: Nghia Uriostegui MD  ANESTHESIA: General    ESTIMATED BLOOD LOSS: 5 cc  DRAINS/TUBES: None  COMPLICATIONS: None.   SPECIMEN:   ID Type Source Tests Collected by Time Destination   A : left spermatocele sac Tissue Scrotum SURGICAL PATHOLOGY EXAM Nghia Uriostegui MD 6/5/2018  8:11 AM    SIGNIFICANT FINDINGS: Left spermatocele excised. Stalk over sewn.    BRIEF OPERATIVE INDICATIONS: Nimco Knutson is a 67 year old male who recently presented with left-sided scrotal swelling. Ultrasound and exam consistent with spermatocele. Patient was symptomatic and elects for excision. After a discussion of all risks, benefits, and alternatives, the patient elected to proceed with today's procedure      DESCRIPTION OF PROCEDURE:  After informed consent was verified, the patient was transported to the operating room, placed supine on the table. After adequate anesthesia was induced, he was placed in supine position and prepped and draped in the usual sterile fashion. A timeout was taken to confirm correct patient, procedure and laterality. Pre-operative IV antibiotics were administered.    An incision was made over the left geneva-scrotum. Dissection was carried down through dartos muscle to the tunica vaginalis. This was mobilized and left geneva-scrotal structures were brought through incision. Tunica was opened exposing the left testis and a spermatocele. Dissection was carried out to carefully mobilize the spermatocele taking care to avoid injury to the testis and spermatic cord structures. This was completed in a systematic fashion until a stalk to the remnant epididymis was noted. This was tied off with 3-0 vicryl suture. Spermatocele was excised. Cautery was used to obtain meticulous hemostasis. Testis was uninjured and healthy-appearing.  This was returned to the scrotum. Dartos muscle was closed with running 3-0 vicryl suture. Skin closed with 3-0 chromic suture in interrupted fashion. Bacitracin, fluffs, and scrotal supporter were placed.  The patient tolerated the procedure well.  No apparent complications. He was transported to the postanesthesia care unit in stable condition.       PLAN: Return in 2 weeks for wound check.     Nghia Uriostegui MD  Urology  [facility]  Clinic Phone [phone]

## 2018-06-05 NOTE — IP AVS SNAPSHOT
[facility] PreOP/PostOP    [address]    [facility] [address]    Phone:  [phone]    Fax:  [phone]                                       After Visit Summary   6/5/2018    Nimco Knutson    MRN: [id number]           After Visit Summary Signature Page     I have received my discharge instructions, and my questions have been answered. I have discussed any challenges I see with this plan with the nurse or doctor.    ..........................................................................................................................................  Patient/Patient Representative Signature      ..........................................................................................................................................  Patient Representative Print Name and Relationship to Patient    ..................................................               ................................................  Date                                            Time    ..........................................................................................................................................  Reviewed by Signature/Title    ...................................................              ..............................................  Date                                                            Time

## 2018-06-05 NOTE — DISCHARGE INSTRUCTIONS
GENERAL ANESTHESIA OR SEDATION ADULT DISCHARGE INSTRUCTIONS   SPECIAL PRECAUTIONS FOR 24 HOURS AFTER SURGERY    IT IS NOT UNUSUAL TO FEEL LIGHT-HEADED OR FAINT, UP TO 24 HOURS AFTER SURGERY OR WHILE TAKING PAIN MEDICATION.  IF YOU HAVE THESE SYMPTOMS; SIT FOR A FEW MINUTES BEFORE STANDING AND HAVE SOMEONE ASSIST YOU WHEN YOU GET UP TO WALK OR USE THE BATHROOM.    YOU SHOULD REST AND RELAX FOR THE NEXT 24 HOURS AND YOU MUST MAKE ARRANGEMENTS TO HAVE SOMEONE STAY WITH YOU FOR AT LEAST 24 HOURS AFTER YOUR DISCHARGE.  AVOID HAZARDOUS AND STRENUOUS ACTIVITIES.  DO NOT MAKE IMPORTANT DECISIONS FOR 24 HOURS.    DO NOT DRIVE ANY VEHICLE OR OPERATE MECHANICAL EQUIPMENT FOR 24 HOURS FOLLOWING THE END OF YOUR SURGERY.  EVEN THOUGH YOU MAY FEEL NORMAL, YOUR REACTIONS MAY BE AFFECTED BY THE MEDICATION YOU HAVE RECEIVED.    DO NOT DRINK ALCOHOLIC BEVERAGES FOR 24 HOURS FOLLOWING YOUR SURGERY.    DRINK CLEAR LIQUIDS (APPLE JUICE, GINGER ALE, 7-UP, BROTH, ETC.).  PROGRESS TO YOUR REGULAR DIET AS YOU FEEL ABLE.    YOU MAY HAVE A DRY MOUTH, A SORE THROAT, MUSCLES ACHES OR TROUBLE SLEEPING.  THESE SHOULD GO AWAY AFTER 24 HOURS.    CALL YOUR DOCTOR FOR ANY OF THE FOLLOWING:  SIGNS OF INFECTION (FEVER, GROWING TENDERNESS AT THE SURGERY SITE, A LARGE AMOUNT OF DRAINAGE OR BLEEDING, SEVERE PAIN, FOUL-SMELLING DRAINAGE, REDNESS OR SWELLING.    IT HAS BEEN OVER 8 TO 10 HOURS SINCE SURGERY AND YOU ARE STILL NOT ABLE TO URINATE (PASS WATER).     DR. JUSTIN NICHOLS M.D.         CLINIC PHONE NUMBER:  [phone]    HOME CARE FOLLOWING MINOR SURGERY        DRESSING  Keep dressing dry and in place until your doctor instructs you to remove the dressing.    DRAINAGE  There should be minimal drainage. If bleeding should occur and soaks through the dressing apply a sterile, dry dressing over it and tape in place. If bleeding persists, apply gentle, steady pressure with your hand over the dressing for 5 minutes. If the bleeding does not stop, call  your doctor.    SKIN CLOSURE  You may have stitches or special skin closures. You will be given an appointment for the removal of any external stitches. You may have stitches under the skin which will absorb. You may have steri-strips over the incision. These look like thin tapes and will peel off in 5-7 days. If they don t after 7 days, you may carefully remove them. You may shower with the steri-strips but do not soak them, as with swimming or taking a bath. A protective covering of plastic may be placed over external stitches for showering.    NOTIFY YOUR PHYSICIAN IF YOU HAVE ANY OF THE FOLLOWING SYMPTOMS:    1. Fever  2. Excessive bleeding or drainage  3. Disruption of the skin closure  4. Swelling, redness or excessive tenderness at the site  5. Severe pain  6. Drainage that is green, yellow, thick white or has a bad odor    You received Toradol, an IV form of ibuprofen (Motrin) at 7:30 AM.  Do not take any ibuprofen products until 1:30 PM.

## 2018-06-05 NOTE — IP AVS SNAPSHOT
MRN:[id number]                      After Visit Summary   6/5/2018    Nimco Knutson    MRN: [id number]           Thank you!     Thank you for choosing [facility] for your care. Our goal is always to provide you with excellent care. Hearing back from our patients is one way we can continue to improve our services. Please take a few minutes to complete the written survey that you may receive in the mail after you visit. If you would like to speak to someone directly about your visit please contact Patient Relations at [phone]. Thank you!          Patient Information     Date Of Birth          1950        Designated Caregiver       Most Recent Value    Caregiver    Will someone help with your care after discharge? yes    Name of designated caregiver wife    Phone number of caregiver home      About your hospital stay     You were admitted on:  June 5, 2018 You last received care in the:  [facility] PreOP/PostOP    You were discharged on:  June 5, 2018       Who to Call     For medical emergencies, please call 911.  For non-urgent questions about your medical care, please call your primary care provider or clinic, [phone]  For questions related to your surgery, please call your surgery clinic        Attending Provider     Provider Specialty    Nghia Uriostegui MD Urology       Primary Care Provider Office Phone # Fax #    Caleb Napier [phone]      After Care Instructions     Diet Instructions       Resume pre procedure diet            Discharge Instructions       Patient to arrange for follow up appointment in 2 weeks            Discharge Instructions       Activity  - No strenuous exercise for at least 2 weeks.  - No lifting, pushing, pulling more than 15 pounds for at least 2 weeks.   - Do not strain with bowel movements.  - Do not drive until you can press the brake pedal quickly and fully without pain.   - Do not operate a  motor vehicle while taking narcotic pain medications.     Incisions  - You may shower and get incisions wet starting 48 hrs after surgery.  - Do not scrub incisions or submerge wounds (aka, bath, pool, hot tub, ect.) for 2 weeks.     Medications  - Do not take more than 4,000mg of Tylenol (acetaminophen) in any 24 hour period, as this can cause liver damage.  - Take stool softeners such as Senna while you are using narcotics, but stop if you develop diarrhea.   - Wean yourself off of narcotic pain medications.     - Call or return sooner than your regularly scheduled visit if you develop any of the following:  fever, uncontrolled pain, uncontrolled nausea or vomiting, as well as increased redness, swelling, or drainage from your wound.            Dressing       Keep dressing clean and dry, change as instructed by Surgeon or RN            Ice to affected area       Ice to operative site.  PRN as tolerated            No Alcohol       for 24 hours post procedure            No driving or operating machinery        until the day after procedure            No lifting over 15 pounds and no strenuous physical activity       for 2 weeks                  Your next 10 appointments already scheduled     Jun 26, 2018 11:00 AM CDT   Post-Op with Nghia Uriostegui MD   [facility] [place] ([facility])    [address]  [address]  [facility] [address] [phone]              Further instructions from your care team       GENERAL ANESTHESIA OR SEDATION ADULT DISCHARGE INSTRUCTIONS   SPECIAL PRECAUTIONS FOR 24 HOURS AFTER SURGERY    IT IS NOT UNUSUAL TO FEEL LIGHT-HEADED OR FAINT, UP TO 24 HOURS AFTER SURGERY OR WHILE TAKING PAIN MEDICATION.  IF YOU HAVE THESE SYMPTOMS; SIT FOR A FEW MINUTES BEFORE STANDING AND HAVE SOMEONE ASSIST YOU WHEN YOU GET UP TO WALK OR USE THE BATHROOM.    YOU SHOULD REST AND RELAX FOR THE NEXT 24 HOURS AND YOU MUST MAKE  ARRANGEMENTS TO HAVE SOMEONE STAY WITH YOU FOR AT LEAST 24 HOURS AFTER YOUR DISCHARGE.  AVOID HAZARDOUS AND STRENUOUS ACTIVITIES.  DO NOT MAKE IMPORTANT DECISIONS FOR 24 HOURS.    DO NOT DRIVE ANY VEHICLE OR OPERATE MECHANICAL EQUIPMENT FOR 24 HOURS FOLLOWING THE END OF YOUR SURGERY.  EVEN THOUGH YOU MAY FEEL NORMAL, YOUR REACTIONS MAY BE AFFECTED BY THE MEDICATION YOU HAVE RECEIVED.    DO NOT DRINK ALCOHOLIC BEVERAGES FOR 24 HOURS FOLLOWING YOUR SURGERY.    DRINK CLEAR LIQUIDS (APPLE JUICE, GINGER ALE, 7-UP, BROTH, ETC.).  PROGRESS TO YOUR REGULAR DIET AS YOU FEEL ABLE.    YOU MAY HAVE A DRY MOUTH, A SORE THROAT, MUSCLES ACHES OR TROUBLE SLEEPING.  THESE SHOULD GO AWAY AFTER 24 HOURS.    CALL YOUR DOCTOR FOR ANY OF THE FOLLOWING:  SIGNS OF INFECTION (FEVER, GROWING TENDERNESS AT THE SURGERY SITE, A LARGE AMOUNT OF DRAINAGE OR BLEEDING, SEVERE PAIN, FOUL-SMELLING DRAINAGE, REDNESS OR SWELLING.    IT HAS BEEN OVER 8 TO 10 HOURS SINCE SURGERY AND YOU ARE STILL NOT ABLE TO URINATE (PASS WATER).     DR. JUSTIN NICHOLS M.D.         CLINIC PHONE NUMBER:  [phone]    HOME CARE FOLLOWING MINOR SURGERY        DRESSING  Keep dressing dry and in place until your doctor instructs you to remove the dressing.    DRAINAGE  There should be minimal drainage. If bleeding should occur and soaks through the dressing apply a sterile, dry dressing over it and tape in place. If bleeding persists, apply gentle, steady pressure with your hand over the dressing for 5 minutes. If the bleeding does not stop, call your doctor.    SKIN CLOSURE  You may have stitches or special skin closures. You will be given an appointment for the removal of any external stitches. You may have stitches under the skin which will absorb. You may have steri-strips over the incision. These look like thin tapes and will peel off in 5-7 days. If they don t after 7 days, you may carefully remove them. You may shower with the steri-strips but do not soak them, as  "with swimming or taking a bath. A protective covering of plastic may be placed over external stitches for showering.    NOTIFY YOUR PHYSICIAN IF YOU HAVE ANY OF THE FOLLOWING SYMPTOMS:    1. Fever  2. Excessive bleeding or drainage  3. Disruption of the skin closure  4. Swelling, redness or excessive tenderness at the site  5. Severe pain  6. Drainage that is green, yellow, thick white or has a bad odor    You received Toradol, an IV form of ibuprofen (Motrin) at 7:30 AM.  Do not take any ibuprofen products until 1:30 PM.      Pending Results     Date and Time Order Name Status Description    2018 0812 Surgical pathology exam In process             Admission Information     Date & Time Provider Department Dept. Phone    2018 Nghia Uriostegui MD [facility] PreOP/PostOP [phone]      Your Vitals Were     Blood Pressure Pulse Temperature Respirations Height Weight    129/88 82 97.5  F (36.4  C) (Temporal) 20 1.626 m (5' 4\") 62.6 kg (138 lb)    Pulse Oximetry BMI (Body Mass Index)                98% 23.69 kg/m2          [employer] Information     [employer] lets you send messages to your doctor, view your test results, renew your prescriptions, schedule appointments and more. To sign up, go to [email or web address] . Click on \"Log in\" on the left side of the screen, which will take you to the Welcome page. Then click on \"Sign up Now\" on the right side of the page.     You will be asked to enter the access code listed below, as well as some personal information. Please follow the directions to create your username and password.     Your access code is: [email or web address]  Expires: 2018  2:26 PM     Your access code will  in 90 days. If you need help or a new code, please call your [place] clinic or [phone].        Care EveryWhere ID     This is your Care EveryWhere ID. This could be used by other organizations to access your [place] medical records  [id number]        Equal Access to " Services     [facility]: Hadii aad nino raza Parikhali, waaxda luqadaha, qaybta kaalmada simanolapeggy, tania meraz. So [facility] [phone].    ATENCIÓN: Si kenzie bartholomew, tiene a pulido disposición servicios gratuitos de asistencia lingüística. Llame al [phone].    We comply with applicable federal civil rights laws and [place] laws. We do not discriminate on the basis of race, color, national origin, age, disability, sex, sexual orientation, or gender identity.               Review of your medicines      START taking        Dose / Directions    bacitracin 500 UNIT/GM Oint   Used for:  Spermatocele        Apply topically 2 times daily for 5 days Apply to scrotal incision   Quantity:  30 g   Refills:  0       oxyCODONE IR 5 MG tablet   Commonly known as:  ROXICODONE   Used for:  Spermatocele        Dose:  5-10 mg   Take 1-2 tablets (5-10 mg) by mouth every 3 hours as needed for other (Moderate to Severe Pain)   Quantity:  15 tablet   Refills:  0       senna-docusate 8.6-50 MG per tablet   Commonly known as:  SENOKOT-S;PERICOLACE   Used for:  Spermatocele        Dose:  1-2 tablet   Take 1-2 tablets by mouth 2 times daily as needed for constipation To prevent constipation while taking narcotic pain medication. Start with 1 tablet twice daily. If no bowel movement in 24 hours, increase to 2 tablets twice daily.  Discontinue if you have loose stools or when you are no longer taking narcotics.   Quantity:  20 tablet   Refills:  0            Where to get your medicines      These medications were sent to [facility] [id number] [facility] [address]     Phone:  [phone]     bacitracin 500 UNIT/GM Oint         Some of these will need a paper prescription and others can be bought over the counter. Ask your nurse if you have questions.     Bring a paper prescription for each of these medications     oxyCODONE IR 5 MG  tablet    senna-docusate 8.6-50 MG per tablet                Protect others around you: Learn how to safely use, store and throw away your medicines at www.disposemymeds.org.        Information about OPIOIDS     PRESCRIPTION OPIOIDS: WHAT YOU NEED TO KNOW   You have a prescription for an opioid (narcotic) pain medicine. Opioids can cause addiction. If you have a history of chemical dependency of any type, you are at a higher risk of becoming addicted to opioids. Only take this medicine after all other options have been tried. Take it for as short a time and as few doses as possible.     Do not:    Drive. If you drive while taking these medicines, you could be arrested for driving under the influence (DUI).    Operate heavy machinery    Do any other dangerous activities while taking these medicines.     Drink any alcohol while taking these medicines.      Take with any other medicines that contain acetaminophen. Read all labels carefully. Look for the word  acetaminophen  or  Tylenol.  Ask your pharmacist if you have questions or are unsure.    Store your pills in a secure place, locked if possible. We will not replace any lost or stolen medicine. If you don t finish your medicine, please throw away (dispose) as directed by your pharmacist. The [place] Pollution Control Agency has more information about safe disposal: https://www.pca.[facility].mn.us/living-green/managing-unwanted-medications    All opioids tend to cause constipation. Drink plenty of water and eat foods that have a lot of fiber, such as fruits, vegetables, prune juice, apple juice and high-fiber cereal. Take a laxative (Miralax, milk of magnesia, Colace, Senna) if you don t move your bowels at least every other day.              Medication List: This is a list of all your medications and when to take them. Check marks below indicate your daily home schedule. Keep this list as a reference.      Medications           Morning Afternoon Evening Bedtime As  Needed    bacitracin 500 UNIT/GM Oint   Apply topically 2 times daily for 5 days Apply to scrotal incision   Last time this was given:  1 g on 6/5/2018  8:28 AM                                oxyCODONE IR 5 MG tablet   Commonly known as:  ROXICODONE   Take 1-2 tablets (5-10 mg) by mouth every 3 hours as needed for other (Moderate to Severe Pain)                                senna-docusate 8.6-50 MG per tablet   Commonly known as:  SENOKOT-S;PERICOLACE   Take 1-2 tablets by mouth 2 times daily as needed for constipation To prevent constipation while taking narcotic pain medication. Start with 1 tablet twice daily. If no bowel movement in 24 hours, increase to 2 tablets twice daily.  Discontinue if you have loose stools or when you are no longer taking narcotics.

## 2018-06-05 NOTE — ANESTHESIA PREPROCEDURE EVALUATION
Anesthesia Evaluation     . Pt has had prior anesthetic. Type: General    No history of anesthetic complications          ROS/MED HX    ENT/Pulmonary:  - neg pulmonary ROS     Neurologic:  - neg neurologic ROS     Cardiovascular:  - neg cardiovascular ROS       METS/Exercise Tolerance:     Hematologic:  - neg hematologic  ROS       Musculoskeletal:  - neg musculoskeletal ROS       GI/Hepatic: Comment: carrier    (+) hepatitis type B,       Renal/Genitourinary:  - ROS Renal section negative       Endo:  - neg endo ROS       Psychiatric:  - neg psychiatric ROS       Infectious Disease:  - neg infectious disease ROS       Malignancy:      - no malignancy   Other:    - neg other ROS                 Physical Exam  Normal systems: cardiovascular, pulmonary and dental    Airway   Mallampati: III  TM distance: >3 FB  Neck ROM: full    Dental     Cardiovascular       Pulmonary                     Anesthesia Plan      History & Physical Review  History and physical reviewed and following examination; no interval change.    ASA Status:  1 .    NPO Status:  > 8 hours    Plan for General and LMA with Intravenous and Propofol induction. Maintenance will be Balanced.    PONV prophylaxis:  Ondansetron (or other 5HT-3) and Dexamethasone or Solumedrol       Postoperative Care  Postoperative pain management:  IV analgesics and Oral pain medications.      Consents  Anesthetic plan, risks, benefits and alternatives discussed with:  Patient or representative and Patient..                          .

## 2018-06-05 NOTE — ANESTHESIA POSTPROCEDURE EVALUATION
Patient: Nimco Knutson    Procedure(s):  LEFT SPERMATOCELECTOMY  - Wound Class: II-Clean Contaminated    Diagnosis:Spermatocele  Diagnosis Additional Information: Left Spermatocele    Anesthesia Type:  General, LMA    Note:  Anesthesia Post Evaluation    Patient location during evaluation: PACU  Patient participation: Able to fully participate in evaluation  Level of consciousness: awake and alert  Pain management: adequate  Airway patency: patent  Cardiovascular status: acceptable  Respiratory status: acceptable  Hydration status: acceptable  PONV: none     Anesthetic complications: None          Last vitals:  Vitals:    06/05/18 0930 06/05/18 0935 06/05/18 0959   BP:  129/88    Pulse:      Resp: 16 16 20   Temp:  97.9  F (36.6  C) 97.5  F (36.4  C)   SpO2: 97% 98% 98%         Electronically Signed By: Prakash Orourke MD  June 5, 2018  10:28 AM

## 2018-06-06 LAB — COPATH REPORT: NORMAL

## 2018-06-26 ENCOUNTER — OFFICE VISIT (OUTPATIENT)
Dept: UROLOGY | Facility: CLINIC | Age: 68
End: 2018-06-26
Payer: COMMERCIAL

## 2018-06-26 VITALS
HEIGHT: 64 IN | OXYGEN SATURATION: 98 % | WEIGHT: 138 LBS | DIASTOLIC BLOOD PRESSURE: 70 MMHG | HEART RATE: 86 BPM | BODY MASS INDEX: 23.56 KG/M2 | SYSTOLIC BLOOD PRESSURE: 116 MMHG

## 2018-06-26 DIAGNOSIS — N43.40 SPERMATOCELE: Primary | ICD-10-CM

## 2018-06-26 PROCEDURE — 99024 POSTOP FOLLOW-UP VISIT: CPT | Performed by: UROLOGY

## 2018-06-26 ASSESSMENT — PAIN SCALES - GENERAL: PAINLEVEL: NO PAIN (0)

## 2018-06-26 NOTE — PROGRESS NOTES
"  CHIEF COMPLAINT   It was my pleasure to see Nimco Knutson who is a 67 year old male for follow-up of spermatocelectomy.      HPI  Nimco Knutson is a very pleasant 67 year old male who presents with a history of spermatocele. Had left spermatocelectomy completed 6/5/2018. Here for routine post-op. Doing well. Pathology as below.    FINAL DIAGNOSIS:   Left spermatocele, resection.   - Spermatocele.  No significant inflammation.  Negative for malignancy.     PHYSICAL EXAM  Patient is a 67 year old  male   Vitals: Blood pressure 116/70, pulse 86, height 1.626 m (5' 4\"), weight 62.6 kg (138 lb), SpO2 98 %.  General Appearance Adult: Body mass index is 23.69 kg/(m^2).  Alert, no acute distress, oriented  HENT: throat/mouth:normal, good dentition  Lungs: no respiratory distress, or pursed lip breathing  Heart: No obvious jugular venous distension present  Abdomen: soft, nontender, no organomegaly or masses  Musculoskeltal: extremities normal, no peripheral edema  Skin: no suspicious lesions or rashes  Neuro: Alert, oriented, speech and mentation normal  Psych: affect and mood normal  Gait: Normal  : incision well healed. Mild residual edema. No induration or erythema. Healing well    ASSESSMENT and PLAN  67 year old male s/p left spermatocelectomy. Doing well. Has mild residual swelling that will likely continue to improve. Will follow-up on a prn basis or if he develops additional questions or concerns.    Nghia Uriostegui MD  Urology  [facility]  Clinic Phone [phone]      "

## 2018-06-26 NOTE — LETTER
"6/26/2018      RE: Nimco Knutson  [address] [place] [address]         CHIEF COMPLAINT   It was my pleasure to see Nimco Knutson who is a 67 year old male for follow-up of spermatocelectomy.      HPI  Nimco Knutson is a very pleasant 67 year old male who presents with a history of spermatocele. Had left spermatocelectomy completed 6/5/2018. Here for routine post-op. Doing well. Pathology as below.    FINAL DIAGNOSIS:   Left spermatocele, resection.   - Spermatocele.  No significant inflammation.  Negative for malignancy.     PHYSICAL EXAM  Patient is a 67 year old  male   Vitals: Blood pressure 116/70, pulse 86, height 1.626 m (5' 4\"), weight 62.6 kg (138 lb), SpO2 98 %.  General Appearance Adult: Body mass index is 23.69 kg/(m^2).  Alert, no acute distress, oriented  HENT: throat/mouth:normal, good dentition  Lungs: no respiratory distress, or pursed lip breathing  Heart: No obvious jugular venous distension present  Abdomen: soft, nontender, no organomegaly or masses  Musculoskeltal: extremities normal, no peripheral edema  Skin: no suspicious lesions or rashes  Neuro: Alert, oriented, speech and mentation normal  Psych: affect and mood normal  Gait: Normal  : incision well healed. Mild residual edema. No induration or erythema. Healing well    ASSESSMENT and PLAN  67 year old male s/p left spermatocelectomy. Doing well. Has mild residual swelling that will likely continue to improve. Will follow-up on a prn basis or if he develops additional questions or concerns.    Nghia Uriostegui MD  Urology  [facility]  Clinic Phone [phone]      "

## 2018-06-26 NOTE — MR AVS SNAPSHOT
"              After Visit Summary   2018    Nimco Knutson    MRN: [id number]           Patient Information     Date Of Birth          1950        Visit Information        Provider Department      2018 11:00 AM Nghia Uriostegui MD [facility] [place]        Today's Diagnoses     Spermatocele    -  1       Follow-ups after your visit        Follow-up notes from your care team     Return if symptoms worsen or fail to improve.      Who to contact     If you have questions or need follow up information about today's clinic visit or your schedule please contact [facility] [place] directly at [phone].  Normal or non-critical lab and imaging results will be communicated to you by MyChart, letter or phone within 4 business days after the clinic has received the results. If you do not hear from us within 7 days, please contact the clinic through [employer] or phone. If you have a critical or abnormal lab result, we will notify you by phone as soon as possible.  Submit refill requests through [employer] or call your pharmacy and they will forward the refill request to us. Please allow 3 business days for your refill to be completed.          Additional Information About Your Visit        MyChart Information     [employer] lets you send messages to your doctor, view your test results, renew your prescriptions, schedule appointments and more. To sign up, go to [email or web address] . Click on \"Log in\" on the left side of the screen, which will take you to the Welcome page. Then click on \"Sign up Now\" on the right side of the page.     You will be asked to enter the access code listed below, as well as some personal information. Please follow the directions to create your username and password.     Your access code is: [email or web address]  Expires: 2018  2:26 PM     Your access code will  in 90 days. If you need help or a new " "code, please call your [place] clinic or [phone].        Care EveryWhere ID     This is your Care EveryWhere ID. This could be used by other organizations to access your [place] medical records  [id number]        Your Vitals Were     Pulse Height Pulse Oximetry BMI (Body Mass Index)          86 1.626 m (5' 4\") 98% 23.69 kg/m2         Blood Pressure from Last 3 Encounters:   06/26/18 116/70   06/05/18 124/83   05/13/18 118/62    Weight from Last 3 Encounters:   06/26/18 62.6 kg (138 lb)   06/05/18 62.6 kg (138 lb)   05/15/18 63 kg (139 lb)              Today, you had the following     No orders found for display       Primary Care Provider Office Phone # Fax #    Caleb Napier [phone]       [address] [facility] [address]        Equal Access to Services     SHIVANI HOLDER : Hadii aad ku hadasho Soomaali, waaxda luqadaha, qaybta kaalmada adeegyada, waxay idiin hayaan elena khlaura lopez . So [facility] [phone].    ATENCIÓN: Si habla español, tiene a pulido disposición servicios gratuitos de asistencia lingüística. Llame al [phone].    We comply with applicable federal civil rights laws and [place] laws. We do not discriminate on the basis of race, color, national origin, age, disability, sex, sexual orientation, or gender identity.            Thank you!     Thank you for choosing [facility] [place]  for your care. Our goal is always to provide you with excellent care. Hearing back from our patients is one way we can continue to improve our services. Please take a few minutes to complete the written survey that you may receive in the mail after your visit with us. Thank you!             Your Updated Medication List - Protect others around you: Learn how to safely use, store and throw away your medicines at www.disposemymeds.org.          This list is accurate as of 6/26/18 12:31 PM.  Always use your most recent med list.                "    Brand Name Dispense Instructions for use Diagnosis    oxyCODONE IR 5 MG tablet    ROXICODONE    15 tablet    Take 1-2 tablets (5-10 mg) by mouth every 3 hours as needed for other (Moderate to Severe Pain)    Spermatocele       senna-docusate 8.6-50 MG per tablet    SENOKOT-S;PERICOLACE    20 tablet    Take 1-2 tablets by mouth 2 times daily as needed for constipation To prevent constipation while taking narcotic pain medication. Start with 1 tablet twice daily. If no bowel movement in 24 hours, increase to 2 tablets twice daily.  Discontinue if you have loose stools or when you are no longer taking narcotics.    Spermatocele

## 2018-09-17 ENCOUNTER — OFFICE VISIT (OUTPATIENT)
Dept: INTERNAL MEDICINE | Facility: CLINIC | Age: 68
End: 2018-09-17
Payer: COMMERCIAL

## 2018-09-17 VITALS
HEART RATE: 87 BPM | BODY MASS INDEX: 25.71 KG/M2 | RESPIRATION RATE: 20 BRPM | SYSTOLIC BLOOD PRESSURE: 126 MMHG | OXYGEN SATURATION: 98 % | WEIGHT: 139.7 LBS | HEIGHT: 62 IN | TEMPERATURE: 98 F | DIASTOLIC BLOOD PRESSURE: 62 MMHG

## 2018-09-17 DIAGNOSIS — Z23 NEED FOR PROPHYLACTIC VACCINATION AND INOCULATION AGAINST INFLUENZA: ICD-10-CM

## 2018-09-17 DIAGNOSIS — Z23 NEED FOR VACCINATION WITH 13-POLYVALENT PNEUMOCOCCAL CONJUGATE VACCINE: ICD-10-CM

## 2018-09-17 DIAGNOSIS — Z00.00 ROUTINE GENERAL MEDICAL EXAMINATION AT A HEALTH CARE FACILITY: Primary | ICD-10-CM

## 2018-09-17 DIAGNOSIS — Z72.0 TOBACCO ABUSE: ICD-10-CM

## 2018-09-17 DIAGNOSIS — H26.9 CATARACT OF BOTH EYES, UNSPECIFIED CATARACT TYPE: ICD-10-CM

## 2018-09-17 DIAGNOSIS — Z12.2 ENCOUNTER FOR SCREENING FOR MALIGNANT NEOPLASM OF LUNG: ICD-10-CM

## 2018-09-17 LAB
ALBUMIN SERPL-MCNC: 3.9 G/DL (ref 3.4–5)
ALBUMIN UR-MCNC: NEGATIVE MG/DL
ALP SERPL-CCNC: 92 U/L (ref 40–150)
ALT SERPL W P-5'-P-CCNC: 25 U/L (ref 0–70)
ANION GAP SERPL CALCULATED.3IONS-SCNC: 8 MMOL/L (ref 3–14)
APPEARANCE UR: CLEAR
AST SERPL W P-5'-P-CCNC: 20 U/L (ref 0–45)
BILIRUB SERPL-MCNC: 0.3 MG/DL (ref 0.2–1.3)
BILIRUB UR QL STRIP: NEGATIVE
BUN SERPL-MCNC: 18 MG/DL (ref 7–30)
CALCIUM SERPL-MCNC: 9.3 MG/DL (ref 8.5–10.1)
CHLORIDE SERPL-SCNC: 107 MMOL/L (ref 94–109)
CHOLEST SERPL-MCNC: 217 MG/DL
CO2 SERPL-SCNC: 28 MMOL/L (ref 20–32)
COLOR UR AUTO: YELLOW
CREAT SERPL-MCNC: 0.92 MG/DL (ref 0.66–1.25)
ERYTHROCYTE [DISTWIDTH] IN BLOOD BY AUTOMATED COUNT: 12.3 % (ref 10–15)
GFR SERPL CREATININE-BSD FRML MDRD: 81 ML/MIN/1.7M2
GLUCOSE SERPL-MCNC: 97 MG/DL (ref 70–99)
GLUCOSE UR STRIP-MCNC: NEGATIVE MG/DL
HCT VFR BLD AUTO: 45.7 % (ref 40–53)
HDLC SERPL-MCNC: 73 MG/DL
HGB BLD-MCNC: 15.2 G/DL (ref 13.3–17.7)
HGB UR QL STRIP: NEGATIVE
KETONES UR STRIP-MCNC: NEGATIVE MG/DL
LDLC SERPL CALC-MCNC: 132 MG/DL
LEUKOCYTE ESTERASE UR QL STRIP: NEGATIVE
MCH RBC QN AUTO: 31.3 PG (ref 26.5–33)
MCHC RBC AUTO-ENTMCNC: 33.3 G/DL (ref 31.5–36.5)
MCV RBC AUTO: 94 FL (ref 78–100)
NITRATE UR QL: NEGATIVE
NONHDLC SERPL-MCNC: 144 MG/DL
PH UR STRIP: 6.5 PH (ref 5–7)
PLATELET # BLD AUTO: 128 10E9/L (ref 150–450)
POTASSIUM SERPL-SCNC: 4.4 MMOL/L (ref 3.4–5.3)
PROT SERPL-MCNC: 7.7 G/DL (ref 6.8–8.8)
PSA SERPL-ACNC: 1.41 UG/L (ref 0–4)
RBC # BLD AUTO: 4.85 10E12/L (ref 4.4–5.9)
SODIUM SERPL-SCNC: 143 MMOL/L (ref 133–144)
SOURCE: NORMAL
SP GR UR STRIP: 1.02 (ref 1–1.03)
TRIGL SERPL-MCNC: 58 MG/DL
TSH SERPL DL<=0.005 MIU/L-ACNC: 0.74 MU/L (ref 0.4–4)
UROBILINOGEN UR STRIP-ACNC: 0.2 EU/DL (ref 0.2–1)
WBC # BLD AUTO: 6.1 10E9/L (ref 4–11)

## 2018-09-17 PROCEDURE — 80053 COMPREHEN METABOLIC PANEL: CPT | Performed by: INTERNAL MEDICINE

## 2018-09-17 PROCEDURE — G0008 ADMIN INFLUENZA VIRUS VAC: HCPCS | Performed by: INTERNAL MEDICINE

## 2018-09-17 PROCEDURE — 80061 LIPID PANEL: CPT | Performed by: INTERNAL MEDICINE

## 2018-09-17 PROCEDURE — G0009 ADMIN PNEUMOCOCCAL VACCINE: HCPCS | Performed by: INTERNAL MEDICINE

## 2018-09-17 PROCEDURE — 81003 URINALYSIS AUTO W/O SCOPE: CPT | Performed by: INTERNAL MEDICINE

## 2018-09-17 PROCEDURE — 84443 ASSAY THYROID STIM HORMONE: CPT | Performed by: INTERNAL MEDICINE

## 2018-09-17 PROCEDURE — 90670 PCV13 VACCINE IM: CPT | Performed by: INTERNAL MEDICINE

## 2018-09-17 PROCEDURE — 85027 COMPLETE CBC AUTOMATED: CPT | Performed by: INTERNAL MEDICINE

## 2018-09-17 PROCEDURE — 90662 IIV NO PRSV INCREASED AG IM: CPT | Performed by: INTERNAL MEDICINE

## 2018-09-17 PROCEDURE — G0103 PSA SCREENING: HCPCS | Performed by: INTERNAL MEDICINE

## 2018-09-17 PROCEDURE — G0438 PPPS, INITIAL VISIT: HCPCS | Performed by: INTERNAL MEDICINE

## 2018-09-17 PROCEDURE — 36415 COLL VENOUS BLD VENIPUNCTURE: CPT | Performed by: INTERNAL MEDICINE

## 2018-09-17 ASSESSMENT — ACTIVITIES OF DAILY LIVING (ADL)
I_NEED_ASSISTANCE_FOR_THE_FOLLOWING_DAILY_ACTIVITIES:: NO ASSISTANCE IS NEEDED
CURRENT_FUNCTION: NO ASSISTANCE NEEDED

## 2018-09-17 NOTE — PROGRESS NOTES

## 2018-09-17 NOTE — MR AVS SNAPSHOT
After Visit Summary   9/17/2018    Nimco Knutson    MRN: [id number]           Patient Information     Date Of Birth          1950        Visit Information        Provider Department      9/17/2018 7:40 AM Caleb Napier MD [facility]        Today's Diagnoses     Routine general medical examination at a health care facility    -  1    Cataract of both eyes, unspecified cataract type        Encounter for screening for malignant neoplasm of lung        Tobacco abuse           Follow-ups after your visit        Additional Services     OPHTHALMOLOGY ADULT REFERRAL       Your provider has referred you to: N: [facility]  [phone]  [email or web address]    Please be aware that coverage of these services is subject to the terms and limitations of your health insurance plan.  Call member services at your health plan with any benefit or coverage questions.      Please bring the following with you to your appointment:    (1) Any X-Rays, CTs or MRIs which have been performed.  Contact the facility where they were done to arrange for  prior to your scheduled appointment.    (2) List of current medications  (3) This referral request   (4) Any documents/labs given to you for this referral                  Future tests that were ordered for you today     Open Future Orders        Priority Expected Expires Ordered    CT Chest Lung Cancer Scrn Low Dose wo Routine  9/17/2019 9/17/2018            Who to contact     If you have questions or need follow up information about today's clinic visit or your schedule please contact [facility] directly at [phone].  Normal or non-critical lab and imaging results will be communicated to you by MyChart, letter or phone within 4 business days after the clinic has received the results. If you do not hear from us within 7 days, please contact the clinic through MyChart or phone.  "If you have a critical or abnormal lab result, we will notify you by phone as soon as possible.  Submit refill requests through [employer] or call your pharmacy and they will forward the refill request to us. Please allow 3 business days for your refill to be completed.          Additional Information About Your Visit        Care EveryWhere ID     This is your Care EveryWhere ID. This could be used by other organizations to access your [place] medical records  [id number]        Your Vitals Were     Pulse Temperature Respirations Height Pulse Oximetry BMI (Body Mass Index)    87 98  F (36.7  C) (Oral) 20 5' 2\" (1.575 m) 98% 25.55 kg/m2       Blood Pressure from Last 3 Encounters:   09/17/18 126/62   06/26/18 116/70   06/05/18 124/83    Weight from Last 3 Encounters:   09/17/18 139 lb 11.2 oz (63.4 kg)   06/26/18 138 lb (62.6 kg)   06/05/18 138 lb (62.6 kg)              We Performed the Following     *UA reflex to Microscopic     CBC with platelets     Comprehensive metabolic panel     Lipid panel reflex to direct LDL Fasting     OPHTHALMOLOGY ADULT REFERRAL     Prostate spec antigen screen     TSH with free T4 reflex        Primary Care Provider Office Phone # Fax #    Caleb Napier [phone]       [address] [facility] [address]        Equal Access to Services     SHIVANI HOLDER : Hadii aad ku hadasho Soomaali, waaxda luqadaha, qaybta kaalmada adeegyada, waxay milla haybruno lopez . So [facility] [phone].    ATENCIÓN: Si habla español, tiene a pulido disposición servicios gratuitos de asistencia lingüística. Llame al [phone].    We comply with applicable federal civil rights laws and [place] laws. We do not discriminate on the basis of race, color, national origin, age, disability, sex, sexual orientation, or gender identity.            Thank you!     Thank you for choosing [facility]  for your care. Our goal is always to provide you with excellent " care. Hearing back from our patients is one way we can continue to improve our services. Please take a few minutes to complete the written survey that you may receive in the mail after your visit with us. Thank you!             Your Updated Medication List - Protect others around you: Learn how to safely use, store and throw away your medicines at www.disposemymeds.org.      Notice  As of 9/17/2018  8:25 AM    You have not been prescribed any medications.

## 2018-09-17 NOTE — PROGRESS NOTES
SUBJECTIVE:   Nimco Knutson is a 67 year old male who presents for Preventive Visit.      Are you in the first 12 months of your Medicare coverage?  No    Physical   Annual:     Getting at least 3 servings of Calcium per day:  Yes    Bi-annual eye exam:  NO    Dental care twice a year:  Yes    Sleep apnea or symptoms of sleep apnea:  None    Diet:  Regular (no restrictions)    Taking medications regularly:  Yes    Medication side effects:  None    Additional concerns today:  No    Ability to successfully perform activities of daily living: no assistance needed    Home Safety:  No safety concerns identified    Hearing Impairment: need to ask people to speak up or repeat themselves      Ability to successfully perform activities of daily living: Yes, no assistance needed  Home safety:  none identified   Hearing impairment: no    Fall risk:       COGNITIVE SCREEN  1) Repeat 3 items (Leader, Season, Table)    2) Clock draw: NORMAL, missed the time  3) 3 item recall: Recalls 3 objects  Results: 3 items recalled: COGNITIVE IMPAIRMENT LESS LIKELY    Mini-CogTM Copyright S Merlin. Licensed by the author for use in [facility]; reprinted with permission (ken@[email or web address]). All rights reserved.        Reviewed and updated as needed this visit by clinical staff  Tobacco  Allergies  Meds  Med Hx  Surg Hx  Fam Hx  Soc Hx        Reviewed and updated as needed this visit by Provider        Social History   Substance Use Topics     Smoking status: Current Every Day Smoker     Packs/day: 0.50     Years: 40.00     Types: Cigarettes     Smokeless tobacco: Never Used     Alcohol use No       Alcohol Use 9/17/2018   If you drink alcohol do you typically have greater than 3 drinks per day OR greater than 7 drinks per week? No   No flowsheet data found.        PROBLEMS TO ADD ON...  Has h/o smoking, mild cough. No SOB, wheezing, CP.     Today's PHQ-2 Score:   PHQ-2 ( 1999 Pfizer) 9/17/2018   Q1: Little interest or pleasure  in doing things 0   Q2: Feeling down, depressed or hopeless 0   PHQ-2 Score 0   Q1: Little interest or pleasure in doing things Not at all   Q2: Feeling down, depressed or hopeless Not at all   PHQ-2 Score 0       Do you feel safe in your environment - Yes    Do you have a Health Care Directive?: No: Advance care planning was reviewed with patient; patient declined at this time.    Current providers sharing in care for this patient include:   Patient Care Team:  Caleb Napier MD as PCP - General (Internal Medicine)    The following health maintenance items are reviewed in Epic and correct as of today:  Health Maintenance   Topic Date Due     HEPATITIS C SCREENING  12/08/1968     PNEUMOCOCCAL (1 of 2 - PCV13) 12/08/2015     AORTIC ANEURYSM SCREENING (SYSTEM ASSIGNED)  12/08/2015     FALL RISK ASSESSMENT  04/24/2018     PHQ-2 Q1 YR  04/24/2018     LUNG CANCER SCREENING ANNUAL  05/11/2018     ADVANCE DIRECTIVE PLANNING Q5 YRS  05/13/2018     INFLUENZA VACCINE (1) 09/01/2018     TETANUS Q10 YR  08/13/2019     LIPID SCREEN Q5 YR MALE (SYSTEM ASSIGNED)  05/05/2022     COLONOSCOPY Q10 YR  01/21/2026     Labs reviewed in EPIC  BP Readings from Last 3 Encounters:   09/17/18 126/62   06/26/18 116/70   06/05/18 124/83    Wt Readings from Last 3 Encounters:   09/17/18 139 lb 11.2 oz (63.4 kg)   06/26/18 138 lb (62.6 kg)   06/05/18 138 lb (62.6 kg)                    Pneumonia Vaccine:Adults age 65+ who received Pneumovax (PPSV23) at 65 years or older: Should be given PCV13 > 1 year after their most recent PPSV23    Review of Systems  CONSTITUTIONAL: NEGATIVE for fever, chills, change in weight  INTEGUMENTARY/SKIN: NEGATIVE for worrisome rashes, moles or lesions  EYES: NEGATIVE for vision changes or irritation  ENT/MOUTH: NEGATIVE for ear, mouth and throat problems  RESP: NEGATIVE for significant cough or SOB  BREAST: NEGATIVE for masses, tenderness or discharge  CV: NEGATIVE for chest pain, palpitations or peripheral  "edema  GI: NEGATIVE for nausea, abdominal pain, heartburn, or change in bowel habits  : NEGATIVE for frequency, dysuria, or hematuria  MUSCULOSKELETAL: NEGATIVE for significant arthralgias or myalgia  NEURO: NEGATIVE for weakness, dizziness or paresthesias  ENDOCRINE: NEGATIVE for temperature intolerance, skin/hair changes  HEME: NEGATIVE for bleeding problems  PSYCHIATRIC: NEGATIVE for changes in mood or affect    OBJECTIVE:   /62 (BP Location: Left arm, Patient Position: Sitting, Cuff Size: Adult Regular)  Pulse 87  Temp 98  F (36.7  C) (Oral)  Resp 20  Ht 5' 2\" (1.575 m)  Wt 139 lb 11.2 oz (63.4 kg)  SpO2 98%  BMI 25.55 kg/m2 Estimated body mass index is 25.55 kg/(m^2) as calculated from the following:    Height as of this encounter: 5' 2\" (1.575 m).    Weight as of this encounter: 139 lb 11.2 oz (63.4 kg).  Physical Exam  GENERAL: healthy, alert and no distress  EYES: Eyes grossly normal to inspection, PERRL and conjunctivae and sclerae normal  HENT: ear canals and TM's normal, nose and mouth without ulcers or lesions  NECK: no adenopathy, no asymmetry, masses, or scars and thyroid normal to palpation  RESP: lungs clear to auscultation - no rales, rhonchi , mild wheezes  CV: regular rate and rhythm, normal S1 S2, no S3 or S4, no murmur, click or rub, no peripheral edema and peripheral pulses strong  ABDOMEN: soft, nontender, no hepatosplenomegaly, no masses and bowel sounds normal  MS: no gross musculoskeletal defects noted, no edema  SKIN: no suspicious lesions or rashes  NEURO: Normal strength and tone, mentation intact and speech normal  PSYCH: mentation appears normal, affect normal/bright    Diagnostic Test Results:  none     ASSESSMENT / PLAN:       ICD-10-CM    1. Routine general medical examination at a health care facility Z00.00 CBC with platelets     Comprehensive metabolic panel     Lipid panel reflex to direct LDL Fasting     TSH with free T4 reflex     Prostate spec antigen screen    " " *UA reflex to Microscopic   2. Cataract of both eyes, unspecified cataract type H26.9 OPHTHALMOLOGY ADULT REFERRAL   3. Encounter for screening for malignant neoplasm of lung Z12.2 CT Chest Lung Cancer Scrn Low Dose wo   4. Tobacco abuse Z72.0 CT Chest Lung Cancer Scrn Low Dose wo       End of Life Planning:  Patient currently has an advanced directive: Yes.  Practitioner is supportive of decision.    COUNSELING:  Reviewed preventive health counseling, as reflected in patient instructions       Regular exercise       Healthy diet/nutrition       Vision screening       Hearing screening       Dental care       Immunizations    Vaccinated for: Influenza and Pneumococcal             Colon cancer screening       Prostate cancer screening    BP Readings from Last 1 Encounters:   09/17/18 126/62     Estimated body mass index is 25.55 kg/(m^2) as calculated from the following:    Height as of this encounter: 5' 2\" (1.575 m).    Weight as of this encounter: 139 lb 11.2 oz (63.4 kg).           reports that he has been smoking Cigarettes.  He has a 20.00 pack-year smoking history. He has never used smokeless tobacco.  Tobacco Cessation Action Plan: Information offered: Patient not interested at this time    Appropriate preventive services were discussed with this patient, including applicable screening as appropriate for cardiovascular disease, diabetes, osteopenia/osteoporosis, and glaucoma.  As appropriate for age/gender, discussed screening for colorectal cancer, prostate cancer, breast cancer, and cervical cancer. Checklist reviewing preventive services available has been given to the patient.    Reviewed patients plan of care and provided an AVS. The Intermediate Care Plan ( asthma action plan, low back pain action plan, and migraine action plan) for Nimco meets the Care Plan requirement. This Care Plan has been established and reviewed with the Patient.    Counseling Resources:  ATP IV Guidelines  Pooled Cohorts " Equation Calculator  Breast Cancer Risk Calculator  FRAX Risk Assessment  ICSI Preventive Guidelines  Dietary Guidelines for Americans, 2010  [employer]'s MyPlate  ASA Prophylaxis  Lung CA Screening    Caleb Napier MD  [facility]  Answers for HPI/ROS submitted by the patient on 9/17/2018   PHQ-2 Score: 0

## 2018-09-17 NOTE — LETTER
September 20, 2018      Nimco Knutson  [address] [place] [address]        Dear ,    Your lab results came back with slightly high cholesterol and the platelets are low as well. The rest of the results are within acceptable limits. Recommendations are to stay on a healthy diet and exercise regularly. We will recheck you levels in 6 months.     Resulted Orders   CBC with platelets   Result Value Ref Range    WBC 6.1 4.0 - 11.0 10e9/L    RBC Count 4.85 4.4 - 5.9 10e12/L    Hemoglobin 15.2 13.3 - 17.7 g/dL    Hematocrit 45.7 40.0 - 53.0 %    MCV 94 78 - 100 fl    MCH 31.3 26.5 - 33.0 pg    MCHC 33.3 31.5 - 36.5 g/dL    RDW 12.3 10.0 - 15.0 %    Platelet Count 128 (L) 150 - 450 10e9/L   Comprehensive metabolic panel   Result Value Ref Range    Sodium 143 133 - 144 mmol/L    Potassium 4.4 3.4 - 5.3 mmol/L    Chloride 107 94 - 109 mmol/L    Carbon Dioxide 28 20 - 32 mmol/L    Anion Gap 8 3 - 14 mmol/L    Glucose 97 70 - 99 mg/dL      Comment:      Fasting specimen    Urea Nitrogen 18 7 - 30 mg/dL    Creatinine 0.92 0.66 - 1.25 mg/dL    GFR Estimate 81 >60 mL/min/1.7m2      Comment:      Non  GFR Calc    GFR Estimate If Black >90 >60 mL/min/1.7m2      Comment:       GFR Calc    Calcium 9.3 8.5 - 10.1 mg/dL    Bilirubin Total 0.3 0.2 - 1.3 mg/dL    Albumin 3.9 3.4 - 5.0 g/dL    Protein Total 7.7 6.8 - 8.8 g/dL    Alkaline Phosphatase 92 40 - 150 U/L    ALT 25 0 - 70 U/L    AST 20 0 - 45 U/L   Lipid panel reflex to direct LDL Fasting   Result Value Ref Range    Cholesterol 217 (H) <200 mg/dL      Comment:      Desirable:       <200 mg/dl    Triglycerides 58 <150 mg/dL      Comment:      Fasting specimen    HDL Cholesterol 73 >39 mg/dL    LDL Cholesterol Calculated 132 (H) <100 mg/dL      Comment:      Above desirable:  100-129 mg/dl  Borderline High:  130-159 mg/dL  High:             160-189 mg/dL  Very high:       >189 mg/dl      Non HDL Cholesterol 144 (H) <130 mg/dL       Comment:      Above Desirable:  130-159 mg/dl  Borderline high:  160-189 mg/dl  High:             190-219 mg/dl  Very high:       >219 mg/dl     TSH with free T4 reflex   Result Value Ref Range    TSH 0.74 0.40 - 4.00 mU/L   Prostate spec antigen screen   Result Value Ref Range    PSA 1.41 0 - 4 ug/L      Comment:      Assay Method:  Chemiluminescence using Siemens Vista analyzer   *UA reflex to Microscopic   Result Value Ref Range    Color Urine Yellow     Appearance Urine Clear     Glucose Urine Negative NEG^Negative mg/dL    Bilirubin Urine Negative NEG^Negative    Ketones Urine Negative NEG^Negative mg/dL    Specific Gravity Urine 1.025 1.003 - 1.035    Blood Urine Negative NEG^Negative    pH Urine 6.5 5.0 - 7.0 pH    Protein Albumin Urine Negative NEG^Negative mg/dL    Urobilinogen Urine 0.2 0.2 - 1.0 EU/dL    Nitrite Urine Negative NEG^Negative    Leukocyte Esterase Urine Negative NEG^Negative    Source Midstream Urine        If you have any questions or concerns, please call the clinic at the number listed above.       Sincerely,        Caleb Napier MD

## 2018-09-19 ENCOUNTER — TELEPHONE (OUTPATIENT)
Dept: INTERNAL MEDICINE | Facility: CLINIC | Age: 68
End: 2018-09-19

## 2018-09-19 DIAGNOSIS — Z87.891 HISTORY OF TOBACCO USE DISORDER: Primary | ICD-10-CM

## 2018-09-19 NOTE — TELEPHONE ENCOUNTER
"Pt states the hospital called him and that his insurance will not cover the CT scan with the diagnosis of \"Screening for lung neoplasm.\"     He is asking if Dr Napier will use what was used last year that is \"History of Tobacco use\". He states the scan was covered last year.     Informed him that can change it but insurance change coverage every year and may not be covered anymore. He agrees, but still wants to try and see if this diagnosis will be covered.       "

## 2018-09-26 ENCOUNTER — HOSPITAL ENCOUNTER (OUTPATIENT)
Dept: CT IMAGING | Facility: CLINIC | Age: 68
Discharge: HOME | End: 2018-09-26
Attending: INTERNAL MEDICINE | Admitting: INTERNAL MEDICINE
Payer: MEDICARE

## 2018-09-26 DIAGNOSIS — Z72.0 TOBACCO ABUSE: ICD-10-CM

## 2018-09-26 DIAGNOSIS — Z12.2 ENCOUNTER FOR SCREENING FOR MALIGNANT NEOPLASM OF LUNG: ICD-10-CM

## 2018-09-26 PROCEDURE — G0297 LDCT FOR LUNG CA SCREEN: HCPCS

## 2018-11-13 ENCOUNTER — OFFICE VISIT (OUTPATIENT)
Dept: FAMILY MEDICINE | Facility: CLINIC | Age: 68
End: 2018-11-13
Payer: COMMERCIAL

## 2018-11-13 VITALS
SYSTOLIC BLOOD PRESSURE: 114 MMHG | HEART RATE: 76 BPM | TEMPERATURE: 98.1 F | WEIGHT: 136 LBS | DIASTOLIC BLOOD PRESSURE: 66 MMHG | RESPIRATION RATE: 18 BRPM | BODY MASS INDEX: 24.87 KG/M2

## 2018-11-13 DIAGNOSIS — Z01.818 PREOP GENERAL PHYSICAL EXAM: Primary | ICD-10-CM

## 2018-11-13 DIAGNOSIS — H25.9 AGE-RELATED CATARACT OF BOTH EYES, UNSPECIFIED AGE-RELATED CATARACT TYPE: ICD-10-CM

## 2018-11-13 PROCEDURE — 99214 OFFICE O/P EST MOD 30 MIN: CPT | Performed by: PHYSICIAN ASSISTANT

## 2018-11-13 NOTE — PROGRESS NOTES
[facility]  [address] [address] [address]  [phone]  Dept: [phone]    PRE-OP EVALUATION:  Today's date: 2018    iNmco Knutson (: 1950) presents for pre-operative evaluation assessment as requested by Dr. Llanos.  He requires evaluation and anesthesia risk assessment prior to undergoing surgery/procedure for treatment of cataracts .    Fax number for surgical facility: [facility] [phone]  Primary Physician: Caleb Napier  Type of Anesthesia Anticipated: General    Patient has a Health Care Directive or Living Will:  NO    Preop Questions 2018   1.  Do you have a history of Heart attack, stroke, stent, coronary bypass surgery, or other heart surgery? No   2.  Do you ever have any pain or discomfort in your chest? No   3.  Do you have a history of  Heart Failure? No   4.   Are you troubled by shortness of breath when:  walking on a level surface, or up a slight hill, or at night? No   5.  Do you currently have a cold, bronchitis or other respiratory infection? No   6.  Do you have a cough, shortness of breath, or wheezing? No   7.  Do you sometimes get pains in the calves of your legs when you walk? No   8. Do you or anyone in your family have previous history of blood clots? No   9.  Do you or does anyone in your family have a serious bleeding problem such as prolonged bleeding following surgeries or cuts? No   10. Have you ever had problems with anemia or been told to take iron pills? No   11. Have you had any abnormal blood loss such as black, tarry or bloody stools? No   12. Have you ever had a blood transfusion? No   13. Have you or any of your relatives ever had problems with anesthesia? No   14. Do you have sleep apnea, excessive snoring or daytime drowsiness? No   15. Do you have any prosthetic heart valves? No   16. Do you have prosthetic joints? No         HPI:     HPI related to upcoming procedure: history of  bilateral cataracts. The above procedures were deemed the next best step.       See problem list for active medical problems.  Problems all longstanding and stable, except as noted/documented.  See ROS for pertinent symptoms related to these conditions.                                                                                                                                                          .  History of hep b carrier. No symptoms.     MEDICAL HISTORY:     Patient Active Problem List    Diagnosis Date Noted     Pure hypercholesterolemia 06/24/2007     Priority: High     Spermatocele 09/12/2017     Priority: Medium     Hyperlipidemia LDL goal <130 10/31/2010     Priority: Medium     Personal history of tobacco use, presenting hazards to health 06/21/2007     Priority: Medium     Hepatitis B carrier (H) 02/07/2003     Priority: Medium     Advanced directives, counseling/discussion 05/13/2013     Priority: Low     Discussed Advance Directive planning with patient; information given to patient to review.        Past Medical History:   Diagnosis Date     Hepatitis B carrier (H)      Mumps      Sciatica      Past Surgical History:   Procedure Laterality Date     C HUNT W/O FACETEC FORAMOT/DSKC 1/2 VRT SEG, LUMBAR  5/04    L4-5 disc R     SPERMATOCELECTOMY Left 6/5/2018    Procedure: SPERMATOCELECTOMY;  Left spermatocelectomy;  Surgeon: Nghia Uriostegui MD;  Location: [facility] OR     No current outpatient prescriptions on file.     OTC products: no recent use of OTC ASA, NSAIDS or Steroids    Allergies   Allergen Reactions     No Known Drug Allergies       Latex Allergy: NO    Social History   Substance Use Topics     Smoking status: Current Every Day Smoker     Packs/day: 0.50     Years: 40.00     Types: Cigarettes     Smokeless tobacco: Never Used     Alcohol use No     History   Drug Use No       REVIEW OF SYSTEMS:   CONSTITUTIONAL: NEGATIVE for fever, chills, change in weight  INTEGUMENTARY/SKIN:  NEGATIVE for worrisome rashes, moles or lesions  EYES: NEGATIVE for vision changes or irritation  ENT/MOUTH: NEGATIVE for ear, mouth and throat problems  RESP: NEGATIVE for significant cough or SOB  CV: NEGATIVE for chest pain, palpitations or peripheral edema  GI: NEGATIVE for nausea, abdominal pain, heartburn, or change in bowel habits  : NEGATIVE for frequency, dysuria, or hematuria  MUSCULOSKELETAL: NEGATIVE for significant arthralgias or myalgia  NEURO: NEGATIVE for weakness, dizziness or paresthesias  ENDOCRINE: NEGATIVE for temperature intolerance, skin/hair changes  HEME: NEGATIVE for bleeding problems  PSYCHIATRIC: NEGATIVE for changes in mood or affect    EXAM:   /66 (BP Location: Right arm, Patient Position: Chair, Cuff Size: Adult Large)  Pulse 76  Temp 98.1  F (36.7  C) (Oral)  Resp 18  Wt 136 lb (61.7 kg)  BMI 24.87 kg/m2    GENERAL APPEARANCE: healthy, alert and no distress     EYES: EOMI,  PERRL     HENT: ear canals and TM's normal and nose and mouth without ulcers or lesions     NECK: no adenopathy, no asymmetry, masses, or scars and thyroid normal to palpation     RESP: lungs clear to auscultation - no rales, rhonchi or wheezes     CV: regular rates and rhythm, normal S1 S2, no S3 or S4 and no murmur, click or rub     ABDOMEN:  soft, nontender, no HSM or masses and bowel sounds normal     MS: extremities normal- no gross deformities noted, no evidence of inflammation in joints, FROM in all extremities.     SKIN: no suspicious lesions or rashes     NEURO: Normal strength and tone, sensory exam grossly normal, mentation intact and speech normal     PSYCH: mentation appears normal. and affect normal/bright     LYMPHATICS: No cervical adenopathy    DIAGNOSTICS:   No labs or EKG required for low risk surgery (cataract, skin procedure, breast biopsy, etc)    Recent Labs   Lab Test  09/17/18   0844  05/05/17   1105   09/02/10   0852   HGB  15.2  15.1   < >  15.9   PLT  128*  141*   < >  148*    NA  143  142   < >  143   POTASSIUM  4.4  4.5   < >  5.4*   CR  0.92  0.82   < >  0.97   A1C   --    --    --   5.7    < > = values in this interval not displayed.        IMPRESSION:   Reason for surgery/procedure: bilateral cataracts  Diagnosis/reason for consult: preoperative exam for the above procedure.     The proposed surgical procedure is considered LOW risk.    REVISED CARDIAC RISK INDEX  The patient has the following serious cardiovascular risks for perioperative complications such as (MI, PE, VFib and 3  AV Block):  No serious cardiac risks  INTERPRETATION: 0 risks: Class I (very low risk - 0.4% complication rate)    The patient has the following additional risks for perioperative complications:  No identified additional risks      ICD-10-CM    1. Preop general physical exam Z01.818    2. Age-related cataract of both eyes, unspecified age-related cataract type H25.9        RECOMMENDATIONS:     NPO after midnight. No asa or nsaids until after surgeries. No alcohol or smoking 24 hours before.       APPROVAL GIVEN to proceed with proposed procedure, without further diagnostic evaluation       Signed Electronically by: Asim Acuña PA-C    Copy of this evaluation report is provided to requesting physician.    [facility] Preop Guidelines    Revised Cardiac Risk Index

## 2018-11-13 NOTE — MR AVS SNAPSHOT
After Visit Summary   11/13/2018    Nimco Knutson    MRN: [id number]           Patient Information     Date Of Birth          1950        Visit Information        Provider Department      11/13/2018 8:30 AM Asim Acuña PA-C [facility]        Today's Diagnoses     Preop general physical exam    -  1    Age-related cataract of both eyes, unspecified age-related cataract type          Care Instructions      Before Your Surgery      Call your surgeon if there is any change in your health. This includes signs of a cold or flu (such as a sore throat, runny nose, cough, rash or fever).    Do not smoke, drink alcohol or take over the counter medicine (unless your surgeon or primary care doctor tells you to) for the 24 hours before and after surgery.    If you take prescribed drugs: Follow your doctor s orders about which medicines to take and which to stop until after surgery.    Eating and drinking prior to surgery: follow the instructions from your surgeon    Take a shower or bath the night before surgery. Use the soap your surgeon gave you to gently clean your skin. If you do not have soap from your surgeon, use your regular soap. Do not shave or scrub the surgery site.  Wear clean pajamas and have clean sheets on your bed.           Follow-ups after your visit        Follow-up notes from your care team     Return in about 1 year (around 11/13/2019) for Routine Visit, with pcp.      Who to contact     If you have questions or need follow up information about today's clinic visit or your schedule please contact [facility] directly at [phone].  Normal or non-critical lab and imaging results will be communicated to you by MyChart, letter or phone within 4 business days after the clinic has received the results. If you do not hear from us within 7 days, please contact the clinic through MyChart or phone. If you have a critical or abnormal lab result, we  will notify you by phone as soon as possible.  Submit refill requests through [employer] or call your pharmacy and they will forward the refill request to us. Please allow 3 business days for your refill to be completed.          Additional Information About Your Visit        Care EveryWhere ID     This is your Care EveryWhere ID. This could be used by other organizations to access your [facility] medical records  [id number]        Your Vitals Were     Pulse Temperature Respirations BMI (Body Mass Index)          76 98.1  F (36.7  C) (Oral) 18 24.87 kg/m2         Blood Pressure from Last 3 Encounters:   11/13/18 114/66   09/17/18 126/62   06/26/18 116/70    Weight from Last 3 Encounters:   11/13/18 136 lb (61.7 kg)   09/17/18 139 lb 11.2 oz (63.4 kg)   06/26/18 138 lb (62.6 kg)              Today, you had the following     No orders found for display       Primary Care Provider Office Phone # Fax #    Caleb Napier [phone]       [address] [facility] [address]        Equal Access to Services     [facility]: Hadii aad ku hadasho Sojack, waaxda luqadaha, qaybta kaalmada yaneli, tania lopez . So [facility] [phone].    ATENCIÓN: Si habla español, tiene a pulido disposición servicios gratuitos de asistencia lingüística. [facility] [phone].    We comply with applicable federal civil rights laws and [place] laws. We do not discriminate on the basis of race, color, national origin, age, disability, sex, sexual orientation, or gender identity.            Thank you!     Thank you for choosing [facility]  for your care. Our goal is always to provide you with excellent care. Hearing back from our patients is one way we can continue to improve our services. Please take a few minutes to complete the written survey that you may receive in the mail after your visit with us. Thank you!             Your Updated Medication List - Protect others  around you: Learn how to safely use, store and throw away your medicines at www.disposemymeds.org.      Notice  As of 11/13/2018  8:45 AM    You have not been prescribed any medications.

## 2019-05-01 ENCOUNTER — TELEPHONE (OUTPATIENT)
Dept: INTERNAL MEDICINE | Facility: CLINIC | Age: 69
End: 2019-05-01

## 2019-05-01 NOTE — TELEPHONE ENCOUNTER
Patient called stating he has had a cough for 2 months and would like to see his primary care provider and have another CT scan. During call, patient stated he was having chest pain.  Patient stated pain was with coughing and not referring to left arm or jaw.  Denies any diaphoresis.  Patient doesn't believe his chest pain is cardiac related.  Last Ct scan was 9/27/18 and it was recommended to have it yearly.  Patient requesting to have it sooner secondary to having cough for so long.  Patient also requesting to see his primary care provider soon.  Transferred patient to scheduling to make an appointment

## 2019-05-14 ENCOUNTER — OFFICE VISIT (OUTPATIENT)
Dept: INTERNAL MEDICINE | Facility: CLINIC | Age: 69
End: 2019-05-14
Payer: COMMERCIAL

## 2019-05-14 ENCOUNTER — ANCILLARY PROCEDURE (OUTPATIENT)
Dept: GENERAL RADIOLOGY | Facility: CLINIC | Age: 69
End: 2019-05-14
Attending: INTERNAL MEDICINE
Payer: COMMERCIAL

## 2019-05-14 VITALS
DIASTOLIC BLOOD PRESSURE: 66 MMHG | WEIGHT: 135 LBS | TEMPERATURE: 98.1 F | HEIGHT: 61 IN | RESPIRATION RATE: 16 BRPM | BODY MASS INDEX: 25.49 KG/M2 | SYSTOLIC BLOOD PRESSURE: 118 MMHG | HEART RATE: 93 BPM | OXYGEN SATURATION: 99 %

## 2019-05-14 DIAGNOSIS — R05.9 COUGH: ICD-10-CM

## 2019-05-14 DIAGNOSIS — Z87.891 PERSONAL HISTORY OF TOBACCO USE, PRESENTING HAZARDS TO HEALTH: ICD-10-CM

## 2019-05-14 DIAGNOSIS — R05.9 COUGH: Primary | ICD-10-CM

## 2019-05-14 PROCEDURE — 99213 OFFICE O/P EST LOW 20 MIN: CPT | Performed by: INTERNAL MEDICINE

## 2019-05-14 PROCEDURE — 71046 X-RAY EXAM CHEST 2 VIEWS: CPT

## 2019-05-14 RX ORDER — CODEINE PHOSPHATE AND GUAIFENESIN 10; 100 MG/5ML; MG/5ML
1-2 SOLUTION ORAL EVERY 4 HOURS PRN
Qty: 180 ML | Refills: 0 | Status: SHIPPED | OUTPATIENT
Start: 2019-05-14 | End: 2019-11-06

## 2019-05-14 RX ORDER — ALBUTEROL SULFATE 90 UG/1
2 AEROSOL, METERED RESPIRATORY (INHALATION) EVERY 6 HOURS
Qty: 8.5 G | Refills: 1 | Status: SHIPPED | OUTPATIENT
Start: 2019-05-14 | End: 2021-12-29

## 2019-05-14 ASSESSMENT — MIFFLIN-ST. JEOR: SCORE: 1252.35

## 2019-05-14 NOTE — PROGRESS NOTES
SUBJECTIVE:   Nimco Knutson is a 68 year old male who presents to clinic today for the following   health issues: cough and possible skin infection near left nostril.      Cough     Presents with cough for 2 weeks, non productive, no SOB, fever, chills, chest pain. Does not affect ambulation. No orthopnea. Has had nose irritation on the left with on and off bleeding. No pain.         Additional history: as documented    Reviewed  and updated as needed this visit by clinical staff  Tobacco  Allergies  Meds  Med Hx  Surg Hx  Fam Hx  Soc Hx        Reviewed and updated as needed this visit by Provider    Constitutional- no fever,chills, energy,appetite, weight change  eyes- no visual changes,diplopia,blurry vision,  ENT- no hearing loss, tinnitus, vertigo, nasal discharge,sore throat  Lungs-no SOB, phlegm, pleurisy  CV-no palpitations, chest pain, orthopnea, exertional dyspnea    GENERAL APPEARANCE: healthy, alert and no distress  EYES: Eyes grossly normal to inspection, PERRL and conjunctivae and sclerae normal  HENT: ear canals and TM's normal and nose- left nostril mucosal ulcer laterally and mouth without ulcers or lesions  NECK: no adenopathy, no asymmetry, masses, or scars and thyroid normal to palpation  RESP: lungs clear to auscultation - no rales, rhonchi , mild bilateral  wheezes  CV: regular rates and rhythm, normal S1 S2, no S3 or S4 and no murmur, click or rub             Patient Active Problem List   Diagnosis     Hepatitis B carrier (H)     Personal history of tobacco use, presenting hazards to health     Pure hypercholesterolemia     Hyperlipidemia LDL goal <130     Advanced directives, counseling/discussion     Spermatocele     Past Surgical History:   Procedure Laterality Date     C HUNT W/O FACETEC FORAMOT/DSKC 1/2 VRT SEG, LUMBAR  5/04    L4-5 disc R     SPERMATOCELECTOMY Left 6/5/2018    Procedure: SPERMATOCELECTOMY;  Left spermatocelectomy;  Surgeon: Nghia Uriostegui MD;   "Location: [facility] OR       Social History     Tobacco Use     Smoking status: Current Every Day Smoker     Packs/day: 0.50     Years: 40.00     Pack years: 20.00     Types: Cigarettes     Smokeless tobacco: Never Used   Substance Use Topics     Alcohol use: No     Alcohol/week: 0.0 oz     Family History   Problem Relation Age of Onset     Family History Negative Father          at age 90     Family History Negative Mother      Cerebrovascular Disease Brother      Diabetes No family hx of      Coronary Artery Disease No family hx of      Hypertension No family hx of      Hyperlipidemia No family hx of          Current Outpatient Medications   Medication Sig Dispense Refill     albuterol (PROAIR HFA/PROVENTIL HFA/VENTOLIN HFA) 108 (90 Base) MCG/ACT inhaler Inhale 2 puffs into the lungs every 6 hours 8.5 g 1     guaiFENesin-codeine (ROBITUSSIN AC) 100-10 MG/5ML solution Take 5-10 mLs by mouth every 4 hours as needed for cough 180 mL 0       Assessment & Plan   Problem List Items Addressed This Visit     Personal history of tobacco use, presenting hazards to health      Other Visit Diagnoses     Cough    -  Primary    Relevant Medications    guaiFENesin-codeine (ROBITUSSIN AC) 100-10 MG/5ML solution    albuterol (PROAIR HFA/PROVENTIL HFA/VENTOLIN HFA) 108 (90 Base) MCG/ACT inhaler    Other Relevant Orders    XR Chest 2 Views (Completed)             Tobacco Cessation:   reports that he has been smoking cigarettes.  He has a 20.00 pack-year smoking history. He has never used smokeless tobacco.  Tobacco Cessation Action Plan: Information offered: Patient not interested at this time      BMI:   Estimated body mass index is 25.16 kg/m  as calculated from the following:    Height as of this encounter: 1.56 m (5' 1.42\").    Weight as of this encounter: 61.2 kg (135 lb).       Assess CXR- negative   Supportive treatment   Reassess if change of symptoms   Consider ENT if nose lesion not healed. 2-3 weeks       Return in about 6 " months (around 11/14/2019) for Routine Visit.    Caleb Napier MD  [facility]

## 2019-11-06 ENCOUNTER — OFFICE VISIT (OUTPATIENT)
Dept: INTERNAL MEDICINE | Facility: CLINIC | Age: 69
End: 2019-11-06
Payer: COMMERCIAL

## 2019-11-06 VITALS
WEIGHT: 128 LBS | TEMPERATURE: 98.3 F | RESPIRATION RATE: 16 BRPM | HEIGHT: 62 IN | DIASTOLIC BLOOD PRESSURE: 60 MMHG | BODY MASS INDEX: 23.55 KG/M2 | SYSTOLIC BLOOD PRESSURE: 118 MMHG | OXYGEN SATURATION: 98 % | HEART RATE: 88 BPM

## 2019-11-06 DIAGNOSIS — R05.9 COUGH: ICD-10-CM

## 2019-11-06 DIAGNOSIS — Z00.00 ENCOUNTER FOR MEDICARE ANNUAL WELLNESS EXAM: Primary | ICD-10-CM

## 2019-11-06 DIAGNOSIS — Z23 NEED FOR 23-POLYVALENT PNEUMOCOCCAL POLYSACCHARIDE VACCINE: ICD-10-CM

## 2019-11-06 DIAGNOSIS — Z87.891 PERSONAL HISTORY OF TOBACCO USE, PRESENTING HAZARDS TO HEALTH: ICD-10-CM

## 2019-11-06 DIAGNOSIS — E78.00 PURE HYPERCHOLESTEROLEMIA: ICD-10-CM

## 2019-11-06 DIAGNOSIS — Z12.5 SCREENING FOR PROSTATE CANCER: ICD-10-CM

## 2019-11-06 DIAGNOSIS — E78.5 HYPERLIPIDEMIA LDL GOAL <130: ICD-10-CM

## 2019-11-06 DIAGNOSIS — Z23 VACCINE FOR VIRAL HEPATITIS: ICD-10-CM

## 2019-11-06 DIAGNOSIS — I25.10 CORONARY ARTERY CALCIFICATION: ICD-10-CM

## 2019-11-06 DIAGNOSIS — Z23 NEED FOR PROPHYLACTIC VACCINATION AND INOCULATION AGAINST INFLUENZA: ICD-10-CM

## 2019-11-06 LAB
ALBUMIN UR-MCNC: NEGATIVE MG/DL
APPEARANCE UR: CLEAR
BILIRUB UR QL STRIP: NEGATIVE
COLOR UR AUTO: YELLOW
ERYTHROCYTE [DISTWIDTH] IN BLOOD BY AUTOMATED COUNT: 12.6 % (ref 10–15)
GLUCOSE UR STRIP-MCNC: NEGATIVE MG/DL
HCT VFR BLD AUTO: 45.2 % (ref 40–53)
HGB BLD-MCNC: 14.7 G/DL (ref 13.3–17.7)
HGB UR QL STRIP: NEGATIVE
KETONES UR STRIP-MCNC: NEGATIVE MG/DL
LEUKOCYTE ESTERASE UR QL STRIP: NEGATIVE
MCH RBC QN AUTO: 30.8 PG (ref 26.5–33)
MCHC RBC AUTO-ENTMCNC: 32.5 G/DL (ref 31.5–36.5)
MCV RBC AUTO: 95 FL (ref 78–100)
NITRATE UR QL: NEGATIVE
PH UR STRIP: 6.5 PH (ref 5–7)
PLATELET # BLD AUTO: 139 10E9/L (ref 150–450)
RBC # BLD AUTO: 4.78 10E12/L (ref 4.4–5.9)
SOURCE: NORMAL
SP GR UR STRIP: 1.01 (ref 1–1.03)
UROBILINOGEN UR STRIP-ACNC: 0.2 EU/DL (ref 0.2–1)
WBC # BLD AUTO: 5.5 10E9/L (ref 4–11)

## 2019-11-06 PROCEDURE — 80061 LIPID PANEL: CPT | Performed by: INTERNAL MEDICINE

## 2019-11-06 PROCEDURE — 85027 COMPLETE CBC AUTOMATED: CPT | Performed by: INTERNAL MEDICINE

## 2019-11-06 PROCEDURE — 90732 PPSV23 VACC 2 YRS+ SUBQ/IM: CPT | Performed by: INTERNAL MEDICINE

## 2019-11-06 PROCEDURE — 36415 COLL VENOUS BLD VENIPUNCTURE: CPT | Performed by: INTERNAL MEDICINE

## 2019-11-06 PROCEDURE — 99397 PER PM REEVAL EST PAT 65+ YR: CPT | Mod: 25 | Performed by: INTERNAL MEDICINE

## 2019-11-06 PROCEDURE — 81003 URINALYSIS AUTO W/O SCOPE: CPT | Performed by: INTERNAL MEDICINE

## 2019-11-06 PROCEDURE — G0008 ADMIN INFLUENZA VIRUS VAC: HCPCS | Mod: 59 | Performed by: INTERNAL MEDICINE

## 2019-11-06 PROCEDURE — 84443 ASSAY THYROID STIM HORMONE: CPT | Performed by: INTERNAL MEDICINE

## 2019-11-06 PROCEDURE — 90636 HEP A/HEP B VACC ADULT IM: CPT | Performed by: INTERNAL MEDICINE

## 2019-11-06 PROCEDURE — G0103 PSA SCREENING: HCPCS | Performed by: INTERNAL MEDICINE

## 2019-11-06 PROCEDURE — 80053 COMPREHEN METABOLIC PANEL: CPT | Performed by: INTERNAL MEDICINE

## 2019-11-06 PROCEDURE — G0009 ADMIN PNEUMOCOCCAL VACCINE: HCPCS | Mod: 59 | Performed by: INTERNAL MEDICINE

## 2019-11-06 PROCEDURE — 90471 IMMUNIZATION ADMIN: CPT | Performed by: INTERNAL MEDICINE

## 2019-11-06 PROCEDURE — 90662 IIV NO PRSV INCREASED AG IM: CPT | Performed by: INTERNAL MEDICINE

## 2019-11-06 PROCEDURE — 93000 ELECTROCARDIOGRAM COMPLETE: CPT | Performed by: INTERNAL MEDICINE

## 2019-11-06 ASSESSMENT — MIFFLIN-ST. JEOR: SCORE: 1221.91

## 2019-11-06 NOTE — LETTER
[facility]  [address], [address]  [place] [address]  [phone]        November 8, 2019    Nimco Knutson  [address] [place] [address]            Dear Mr. Nimco Knutson:      The results of your recent labs shows high Cholesterol, rest of results are normal. Please, keep low fat diet and exercise.      If you have any further questions or problems, please contact our office.      Sincerely,        Caleb Napier M.D.

## 2019-11-06 NOTE — PATIENT INSTRUCTIONS
Patient Education   Personalized Prevention Plan  You are due for the preventive services outlined below.  Your care team is available to assist you in scheduling these services.  If you have already completed any of these items, please share that information with your care team to update in your medical record.  Health Maintenance Due   Topic Date Due     Hepatitis C Screening  1950     Zoster (Shingles) Vaccine (1 of 2) 12/08/2000     AORTIC ANEURYSM SCREENING (SYSTEM ASSIGNED)  12/08/2015     Discuss Advance Care Planning  05/13/2018     Diptheria Tetanus Pertussis (DTAP/TDAP/TD) Vaccine (3 - Td) 08/13/2019     Flu Vaccine (1) 09/01/2019     Annual Wellness Visit  09/17/2019     FALL RISK ASSESSMENT  09/17/2019     Lung Cancer Screening (CT Scan)  09/26/2019     Pneumococcal Vaccine (2 of 2 - PPSV23) 09/17/2019        Patient Education   Personalized Prevention Plan  You are due for the preventive services outlined below.  Your care team is available to assist you in scheduling these services.  If you have already completed any of these items, please share that information with your care team to update in your medical record.  Health Maintenance Due   Topic Date Due     Hepatitis C Screening  1950     Zoster (Shingles) Vaccine (1 of 2) 12/08/2000     AORTIC ANEURYSM SCREENING (SYSTEM ASSIGNED)  12/08/2015     Discuss Advance Care Planning  05/13/2018     Diptheria Tetanus Pertussis (DTAP/TDAP/TD) Vaccine (3 - Td) 08/13/2019     Flu Vaccine (1) 09/01/2019     Annual Wellness Visit  09/17/2019     FALL RISK ASSESSMENT  09/17/2019     Lung Cancer Screening (CT Scan)  09/26/2019     Pneumococcal Vaccine (2 of 2 - PPSV23) 09/17/2019

## 2019-11-06 NOTE — PROGRESS NOTES
"  SUBJECTIVE:   Nimco Knutson is a 68 year old male who presents for Preventive Visit.      Are you in the first 12 months of your Medicare Part B coverage?  No    Physical Health:    In general, how would you rate your overall physical health? good    Outside of work, how many days during the week do you exercise? 2-3 days/week    Outside of work, approximately how many minutes a day do you exercise?15-30 minutes    If you drink alcohol do you typically have >3 drinks per day or >7 drinks per week? No    Do you usually eat at least 4 servings of fruit and vegetables a day, include whole grains & fiber and avoid regularly eating high fat or \"junk\" foods? NO    Do you have any problems taking medications regularly?  N/A    Do you have any side effects from medications? N/A    Needs assistance for the following daily activities: no assistance needed    Which of the following safety concerns are present in your home?  none identified     Hearing impairment: No    In the past 6 months, have you been bothered by leaking of urine? no    Mental Health:    In general, how would you rate your overall mental or emotional health? excellent  PHQ-2 Score: 0    Do you feel safe in your environment? Yes    Have you ever done Advance Care Planning? (For example, a Health Directive, POLST, or a discussion with a medical provider or your loved ones about your wishes): No, advance care planning information given to patient to review.  Patient declined advance care planning discussion at this time.    Additional concerns to address?  YES, back pain    Fall risk:  Fallen 2 or more times in the past year?: No  Any fall with injury in the past year?: No    Cognitive Screenin) Repeat 3 items (Leader, Season, Table)    2) Clock draw: NORMAL  3) 3 item recall: Recalls 2 objects   Results: NORMAL clock, 2 items recalled: COGNITIVE IMPAIRMENT LESS LIKELY    Mini-CogTM Copyright S Merlin. Licensed by the author for use in [facility] " Services; reprinted with permission (soob@[facility]). All rights reserved.      Do you have sleep apnea, excessive snoring or daytime drowsiness?: no        PROBLEMS TO ADD ON...  Has had recurrent cough around the end of fall, for 2 months. Dry, no phlegm, no SOB, no fevers. Has h/o smoking. Had CT with findings of small lung nodules. Needs recheck.       Reviewed and updated as needed this visit by clinical staff  Tobacco  Allergies  Meds  Med Hx  Surg Hx  Fam Hx  Soc Hx        Reviewed and updated as needed this visit by Provider        Social History     Tobacco Use     Smoking status: Current Every Day Smoker     Packs/day: 0.50     Years: 40.00     Pack years: 20.00     Types: Cigarettes     Smokeless tobacco: Never Used   Substance Use Topics     Alcohol use: No     Alcohol/week: 0.0 standard drinks                           Current providers sharing in care for this patient include:   Patient Care Team:  Caleb Napier MD as PCP - General (Internal Medicine)  Caleb Napier MD as Assigned PCP    The following health maintenance items are reviewed in Epic and correct as of today:  Health Maintenance   Topic Date Due     HEPATITIS C SCREENING  1950     ZOSTER IMMUNIZATION (1 of 2) 12/08/2000     AORTIC ANEURYSM SCREENING (SYSTEM ASSIGNED)  12/08/2015     ADVANCE CARE PLANNING  05/13/2018     DTAP/TDAP/TD IMMUNIZATION (3 - Td) 08/13/2019     INFLUENZA VACCINE (1) 09/01/2019     MEDICARE ANNUAL WELLNESS VISIT  09/17/2019     FALL RISK ASSESSMENT  09/17/2019     LUNG CANCER SCREENING ANNUAL  09/26/2019     PNEUMOCOCCAL IMMUNIZATION 65+ LOW/MEDIUM RISK (2 of 2 - PPSV23) 09/17/2019     LIPID  09/17/2023     COLONOSCOPY  01/21/2026     PHQ-2  Completed     IPV IMMUNIZATION  Aged Out     MENINGITIS IMMUNIZATION  Aged Out     Lab work is in process  Labs reviewed in EPIC  Pneumonia Vaccine:Adults age 65+ who have not received previous Pneumovax (PPSV23) or PCV13 as an adult: Should first be given  "PCV13 AND then should be given PPSV23 6-12 months after PCV13    ROS:  CONSTITUTIONAL: NEGATIVE for fever, chills, change in weight  INTEGUMENTARY/SKIN: NEGATIVE for worrisome rashes, moles or lesions  EYES: NEGATIVE for vision changes or irritation  ENT/MOUTH: NEGATIVE for ear, mouth and throat problems  RESP: NEGATIVE for significant cough or SOB  BREAST: NEGATIVE for masses, tenderness or discharge  CV: NEGATIVE for chest pain, palpitations or peripheral edema  GI: NEGATIVE for nausea, abdominal pain, heartburn, or change in bowel habits  : NEGATIVE for frequency, dysuria, or hematuria  MUSCULOSKELETAL: NEGATIVE for significant arthralgias or myalgia  NEURO: NEGATIVE for weakness, dizziness or paresthesias  ENDOCRINE: NEGATIVE for temperature intolerance, skin/hair changes  HEME: NEGATIVE for bleeding problems  PSYCHIATRIC: NEGATIVE for changes in mood or affect    OBJECTIVE:   /60   Pulse 88   Temp 98.3  F (36.8  C) (Oral)   Resp 16   Ht 1.562 m (5' 1.5\")   Wt 58.1 kg (128 lb)   SpO2 98%   BMI 23.79 kg/m   Estimated body mass index is 23.79 kg/m  as calculated from the following:    Height as of this encounter: 1.562 m (5' 1.5\").    Weight as of this encounter: 58.1 kg (128 lb).  EXAM:   GENERAL: healthy, alert and no distress  EYES: Eyes grossly normal to inspection, PERRL and conjunctivae and sclerae normal  HENT: ear canals and TM's normal, nose and mouth without ulcers or lesions  NECK: no adenopathy, no asymmetry, masses, or scars and thyroid normal to palpation  RESP: lungs clear to auscultation - no rales, rhonchi or wheezes  CV: regular rate and rhythm, normal S1 S2, no S3 or S4, no murmur, click or rub, no peripheral edema and peripheral pulses strong  ABDOMEN: soft, nontender, no hepatosplenomegaly, no masses and bowel sounds normal  MS: no gross musculoskeletal defects noted, no edema  SKIN: no suspicious lesions or rashes  NEURO: Normal strength and tone, mentation intact and speech " "normal  PSYCH: mentation appears normal, affect normal/bright    Diagnostic Test Results:  Labs reviewed in Epic    ASSESSMENT / PLAN:       ICD-10-CM    1. Encounter for Medicare annual wellness exam Z00.00 CBC with platelets     Comprehensive metabolic panel     Lipid panel reflex to direct LDL Fasting     TSH with free T4 reflex     Prostate spec antigen screen     *UA reflex to Microscopic     CT Chest Lung Cancer Scrn Low Dose wo     EKG 12-lead complete w/read - Clinics   2. Personal history of tobacco use, presenting hazards to health Z87.891 CT Chest Lung Cancer Scrn Low Dose wo   3. Hyperlipidemia LDL goal <130 E78.5    4. Pure hypercholesterolemia E78.00 CBC with platelets     Comprehensive metabolic panel     Lipid panel reflex to direct LDL Fasting     TSH with free T4 reflex     *UA reflex to Microscopic   5. Screening for prostate cancer Z12.5 Prostate spec antigen screen   6. Cough R05        COUNSELING:  Reviewed preventive health counseling, as reflected in patient instructions       Regular exercise       Healthy diet/nutrition       Vision screening       Hearing screening       Colon cancer screening       Prostate cancer screening    Estimated body mass index is 23.79 kg/m  as calculated from the following:    Height as of this encounter: 1.562 m (5' 1.5\").    Weight as of this encounter: 58.1 kg (128 lb).         reports that he has been smoking cigarettes. He has a 20.00 pack-year smoking history. He has never used smokeless tobacco.  Tobacco Cessation Action Plan: Self help information given to patient    Appropriate preventive services were discussed with this patient, including applicable screening as appropriate for cardiovascular disease, diabetes, osteopenia/osteoporosis, and glaucoma.  As appropriate for age/gender, discussed screening for colorectal cancer, prostate cancer, breast cancer, and cervical cancer. Checklist reviewing preventive services available has been given to the " patient.    Reviewed patients plan of care and provided an AVS. The Intermediate Care Plan ( asthma action plan, low back pain action plan, and migraine action plan) for Nimco meets the Care Plan requirement. This Care Plan has been established and reviewed with the Patient.    Counseling Resources:  ATP IV Guidelines  Pooled Cohorts Equation Calculator  Breast Cancer Risk Calculator  FRAX Risk Assessment  ICSI Preventive Guidelines  Dietary Guidelines for Americans, 2010  USDA's MyPlate  ASA Prophylaxis  Lung CA Screening    Caleb Napier MD  [facility]

## 2019-11-06 NOTE — NURSING NOTE
"Vital signs:  Temp: 98.3  F (36.8  C) Temp src: Oral BP: 118/60 Pulse: 88   Resp: 16 SpO2: 98 %     Height: 156.2 cm (5' 1.5\") Weight: 58.1 kg (128 lb)  Estimated body mass index is 23.79 kg/m  as calculated from the following:    Height as of this encounter: 1.562 m (5' 1.5\").    Weight as of this encounter: 58.1 kg (128 lb).          "

## 2019-11-07 LAB
ALBUMIN SERPL-MCNC: 4 G/DL (ref 3.4–5)
ALP SERPL-CCNC: 99 U/L (ref 40–150)
ALT SERPL W P-5'-P-CCNC: 27 U/L (ref 0–70)
ANION GAP SERPL CALCULATED.3IONS-SCNC: 4 MMOL/L (ref 3–14)
AST SERPL W P-5'-P-CCNC: 22 U/L (ref 0–45)
BILIRUB SERPL-MCNC: 0.5 MG/DL (ref 0.2–1.3)
BUN SERPL-MCNC: 18 MG/DL (ref 7–30)
CALCIUM SERPL-MCNC: 9.2 MG/DL (ref 8.5–10.1)
CHLORIDE SERPL-SCNC: 109 MMOL/L (ref 94–109)
CHOLEST SERPL-MCNC: 234 MG/DL
CO2 SERPL-SCNC: 28 MMOL/L (ref 20–32)
CREAT SERPL-MCNC: 0.88 MG/DL (ref 0.66–1.25)
GFR SERPL CREATININE-BSD FRML MDRD: 88 ML/MIN/{1.73_M2}
GLUCOSE SERPL-MCNC: 86 MG/DL (ref 70–99)
HDLC SERPL-MCNC: 74 MG/DL
LDLC SERPL CALC-MCNC: 152 MG/DL
NONHDLC SERPL-MCNC: 160 MG/DL
POTASSIUM SERPL-SCNC: 5 MMOL/L (ref 3.4–5.3)
PROT SERPL-MCNC: 7.4 G/DL (ref 6.8–8.8)
PSA SERPL-ACNC: 2.1 UG/L (ref 0–4)
SODIUM SERPL-SCNC: 141 MMOL/L (ref 133–144)
TRIGL SERPL-MCNC: 42 MG/DL
TSH SERPL DL<=0.005 MIU/L-ACNC: 0.46 MU/L (ref 0.4–4)

## 2019-11-14 ENCOUNTER — HOSPITAL ENCOUNTER (OUTPATIENT)
Dept: CT IMAGING | Facility: CLINIC | Age: 69
Discharge: HOME | End: 2019-11-14
Attending: INTERNAL MEDICINE | Admitting: INTERNAL MEDICINE
Payer: COMMERCIAL

## 2019-11-14 DIAGNOSIS — Z00.00 ENCOUNTER FOR MEDICARE ANNUAL WELLNESS EXAM: ICD-10-CM

## 2019-11-14 DIAGNOSIS — Z87.891 PERSONAL HISTORY OF TOBACCO USE, PRESENTING HAZARDS TO HEALTH: ICD-10-CM

## 2019-11-14 PROCEDURE — G0297 LDCT FOR LUNG CA SCREEN: HCPCS

## 2019-11-20 ENCOUNTER — HOSPITAL ENCOUNTER (OUTPATIENT)
Dept: CARDIOLOGY | Facility: CLINIC | Age: 69
Discharge: HOME | End: 2019-11-20
Attending: INTERNAL MEDICINE | Admitting: INTERNAL MEDICINE
Payer: COMMERCIAL

## 2019-11-20 DIAGNOSIS — Z87.891 PERSONAL HISTORY OF TOBACCO USE, PRESENTING HAZARDS TO HEALTH: ICD-10-CM

## 2019-11-20 DIAGNOSIS — I25.10 CORONARY ARTERY CALCIFICATION: ICD-10-CM

## 2019-11-20 PROCEDURE — 93350 STRESS TTE ONLY: CPT | Mod: 26 | Performed by: INTERNAL MEDICINE

## 2019-11-20 PROCEDURE — 93325 DOPPLER ECHO COLOR FLOW MAPG: CPT | Mod: TC

## 2019-11-20 PROCEDURE — 93325 DOPPLER ECHO COLOR FLOW MAPG: CPT | Mod: 26 | Performed by: INTERNAL MEDICINE

## 2019-11-20 PROCEDURE — 93016 CV STRESS TEST SUPVJ ONLY: CPT | Performed by: INTERNAL MEDICINE

## 2019-11-20 PROCEDURE — 93018 CV STRESS TEST I&R ONLY: CPT | Performed by: INTERNAL MEDICINE

## 2019-11-20 PROCEDURE — 93321 DOPPLER ECHO F-UP/LMTD STD: CPT | Mod: 26 | Performed by: INTERNAL MEDICINE

## 2019-11-20 NOTE — LETTER
[facility]  [address], [address]  [place] [address]  [phone]        November 20, 2019    Nimco Knutson  [address] [place] [address]            Dear Mr. Nimco Craiguyen:      The results of your recent labs were NORMAL.  If you have any further questions or problems, please contact our office.    Sincerely,        Caleb Napier M.D.

## 2020-11-09 ENCOUNTER — OFFICE VISIT (OUTPATIENT)
Dept: INTERNAL MEDICINE | Facility: CLINIC | Age: 70
End: 2020-11-09
Payer: COMMERCIAL

## 2020-11-09 VITALS
BODY MASS INDEX: 24.29 KG/M2 | TEMPERATURE: 97.9 F | HEIGHT: 62 IN | WEIGHT: 132 LBS | OXYGEN SATURATION: 97 % | SYSTOLIC BLOOD PRESSURE: 118 MMHG | DIASTOLIC BLOOD PRESSURE: 66 MMHG | HEART RATE: 90 BPM

## 2020-11-09 DIAGNOSIS — L98.9 SKIN LESION: ICD-10-CM

## 2020-11-09 DIAGNOSIS — Z00.00 ENCOUNTER FOR PREVENTATIVE ADULT HEALTH CARE EXAMINATION: Primary | ICD-10-CM

## 2020-11-09 DIAGNOSIS — Z12.5 SCREENING FOR PROSTATE CANCER: ICD-10-CM

## 2020-11-09 DIAGNOSIS — Z87.891 PERSONAL HISTORY OF TOBACCO USE, PRESENTING HAZARDS TO HEALTH: ICD-10-CM

## 2020-11-09 LAB
ALBUMIN UR-MCNC: NEGATIVE MG/DL
APPEARANCE UR: CLEAR
BILIRUB UR QL STRIP: NEGATIVE
COLOR UR AUTO: YELLOW
ERYTHROCYTE [DISTWIDTH] IN BLOOD BY AUTOMATED COUNT: 12.5 % (ref 10–15)
GLUCOSE UR STRIP-MCNC: NEGATIVE MG/DL
HCT VFR BLD AUTO: 46.5 % (ref 40–53)
HGB BLD-MCNC: 15.2 G/DL (ref 13.3–17.7)
HGB UR QL STRIP: NEGATIVE
KETONES UR STRIP-MCNC: NEGATIVE MG/DL
LEUKOCYTE ESTERASE UR QL STRIP: NEGATIVE
MCH RBC QN AUTO: 30.8 PG (ref 26.5–33)
MCHC RBC AUTO-ENTMCNC: 32.7 G/DL (ref 31.5–36.5)
MCV RBC AUTO: 94 FL (ref 78–100)
NITRATE UR QL: NEGATIVE
PH UR STRIP: 7 PH (ref 5–7)
PLATELET # BLD AUTO: 149 10E9/L (ref 150–450)
RBC # BLD AUTO: 4.94 10E12/L (ref 4.4–5.9)
SOURCE: NORMAL
SP GR UR STRIP: 1.01 (ref 1–1.03)
UROBILINOGEN UR STRIP-ACNC: 0.2 EU/DL (ref 0.2–1)
WBC # BLD AUTO: 5.6 10E9/L (ref 4–11)

## 2020-11-09 PROCEDURE — 80061 LIPID PANEL: CPT | Performed by: INTERNAL MEDICINE

## 2020-11-09 PROCEDURE — 99397 PER PM REEVAL EST PAT 65+ YR: CPT | Performed by: INTERNAL MEDICINE

## 2020-11-09 PROCEDURE — 36415 COLL VENOUS BLD VENIPUNCTURE: CPT | Performed by: INTERNAL MEDICINE

## 2020-11-09 PROCEDURE — 84443 ASSAY THYROID STIM HORMONE: CPT | Performed by: INTERNAL MEDICINE

## 2020-11-09 PROCEDURE — 85027 COMPLETE CBC AUTOMATED: CPT | Performed by: INTERNAL MEDICINE

## 2020-11-09 PROCEDURE — 80053 COMPREHEN METABOLIC PANEL: CPT | Performed by: INTERNAL MEDICINE

## 2020-11-09 PROCEDURE — 81003 URINALYSIS AUTO W/O SCOPE: CPT | Performed by: INTERNAL MEDICINE

## 2020-11-09 PROCEDURE — G0103 PSA SCREENING: HCPCS | Performed by: INTERNAL MEDICINE

## 2020-11-09 ASSESSMENT — ENCOUNTER SYMPTOMS: COUGH: 1

## 2020-11-09 ASSESSMENT — ACTIVITIES OF DAILY LIVING (ADL): CURRENT_FUNCTION: NO ASSISTANCE NEEDED

## 2020-11-09 ASSESSMENT — MIFFLIN-ST. JEOR: SCORE: 1235.06

## 2020-11-09 NOTE — PROGRESS NOTES
"SUBJECTIVE:   Nimco Knutson is a 69 year old male who presents for Preventive Visit.      Patient has been advised of split billing requirements and indicates understanding: Yes   Are you in the first 12 months of your Medicare coverage?  No    Healthy Habits:     In general, how would you rate your overall health?  Excellent    Frequency of exercise:  2-3 days/week    Duration of exercise:  15-30 minutes    Do you usually eat at least 4 servings of fruit and vegetables a day, include whole grains    & fiber and avoid regularly eating high fat or \"junk\" foods?  No    Taking medications regularly:  Yes    Medication side effects:  Not applicable    Ability to successfully perform activities of daily living:  No assistance needed    Home Safety:  No safety concerns identified    Hearing Impairment:  No hearing concerns    In the past 6 months, have you been bothered by leaking of urine?  No    In general, how would you rate your overall mental or emotional health?  Good      PHQ-2 Total Score: 0    Additional concerns today:  No    Do you feel safe in your environment? Yes    Have you ever done Advance Care Planning? (For example, a Health Directive, POLST, or a discussion with a medical provider or your loved ones about your wishes): Yes, patient states has an Advance Care Planning document and will bring a copy to the clinic.      Fall risk  Fallen 2 or more times in the past year?: No  Any fall with injury in the past year?: No    Cognitive Screening   1) Repeat 3 items (Leader, Season, Table)    2) Clock draw: NORMAL  3) 3 item recall: Recalls 3 objects  Results: 3 items recalled: COGNITIVE IMPAIRMENT LESS LIKELY    Mini-CogTM Copyright LEIDY Boyer. Licensed by the author for use in [facility]; reprinted with permission ([email or web address]@.[email or web address]). All rights reserved.      Do you have sleep apnea, excessive snoring or daytime drowsiness?: no    Reviewed and updated as needed this visit by clinical staff  Tobacco  " Allergies  Meds   Med Hx  Surg Hx  Fam Hx  Soc Hx        Reviewed and updated as needed this visit by Provider                Social History     Tobacco Use     Smoking status: Current Every Day Smoker     Packs/day: 0.50     Years: 40.00     Pack years: 20.00     Types: Cigarettes     Smokeless tobacco: Never Used   Substance Use Topics     Alcohol use: No     Alcohol/week: 0.0 standard drinks     If you drink alcohol do you typically have >3 drinks per day or >7 drinks per week? No    Alcohol Use 11/9/2020   Prescreen: >3 drinks/day or >7 drinks/week? No   Prescreen: >3 drinks/day or >7 drinks/week? -           PROBLEMS TO ADD ON...  Has h/o smoking, still smokes. Has had chronic postnasal drip, mild cough with clear phlegm. No SOB, CP.     Current providers sharing in care for this patient include:   Patient Care Team:  Caleb Napier MD as PCP - General (Internal Medicine)  Caleb Napier MD as Assigned PCP    The following health maintenance items are reviewed in Epic and correct as of today:  Health Maintenance   Topic Date Due     HEPATITIS C SCREENING  12/08/1968     ZOSTER IMMUNIZATION (1 of 2) 12/08/2000     AORTIC ANEURYSM SCREENING (SYSTEM ASSIGNED)  12/08/2015     DTAP/TDAP/TD IMMUNIZATION (3 - Td) 08/13/2019     INFLUENZA VACCINE (1) 09/01/2020     FALL RISK ASSESSMENT  11/06/2020     LUNG CANCER SCREENING ANNUAL  11/14/2020     MEDICARE ANNUAL WELLNESS VISIT  11/09/2021     LIPID  11/06/2024     ADVANCE CARE PLANNING  11/06/2024     COLORECTAL CANCER SCREENING  01/21/2026     PHQ-2  Completed     Pneumococcal Vaccine: 65+ Years  Completed     Pneumococcal Vaccine: Pediatrics (0 to 5 Years) and At-Risk Patients (6 to 64 Years)  Aged Out     IPV IMMUNIZATION  Aged Out     MENINGITIS IMMUNIZATION  Aged Out     Lab work is in process  Labs reviewed in EPIC      Review of Systems   Respiratory: Positive for cough.          OBJECTIVE:   /66 (BP Location: Left arm, Patient Position:  "Sitting, Cuff Size: Adult Regular)   Pulse 90   Temp 97.9  F (36.6  C) (Oral)   Ht 1.562 m (5' 1.5\")   Wt 59.9 kg (132 lb)   SpO2 97%   BMI 24.54 kg/m   Estimated body mass index is 24.54 kg/m  as calculated from the following:    Height as of this encounter: 1.562 m (5' 1.5\").    Weight as of this encounter: 59.9 kg (132 lb).  Physical Exam  GENERAL: healthy, alert and no distress  EYES: Eyes grossly normal to inspection, PERRL and conjunctivae and sclerae normal  HENT: ear canals and TM's normal, nose and mouth without ulcers or lesions  NECK: no adenopathy, no asymmetry, masses, or scars and thyroid normal to palpation  RESP: lungs clear to auscultation - no rales, rhonchi or wheezes  CV: regular rate and rhythm, normal S1 S2, no S3 or S4, no murmur, click or rub, no peripheral edema and peripheral pulses strong  ABDOMEN: soft, nontender, no hepatosplenomegaly, no masses and bowel sounds normal  MS: no gross musculoskeletal defects noted, no edema  SKIN: no suspicious lesions or rashes, multiple SK pigmented lesions on the face.   NEURO: Normal strength and tone, mentation intact and speech normal  PSYCH: mentation appears normal, affect normal/bright    Diagnostic Test Results:  Labs reviewed in Epic    ASSESSMENT / PLAN:       ICD-10-CM    1. Encounter for preventative adult health care examination  Z00.00 CBC with platelets     Comprehensive metabolic panel     Lipid panel reflex to direct LDL Fasting     TSH with free T4 reflex     Prostate spec antigen screen     *UA reflex to Microscopic     DERMATOLOGY ADULT REFERRAL   2. Personal history of tobacco use, presenting hazards to health  Z87.891 CT Chest Lung Cancer Scrn Low Dose wo   3. Screening for prostate cancer  Z12.5 Prostate spec antigen screen   4. Skin lesion  L98.9 DERMATOLOGY ADULT REFERRAL       Patient has been advised of split billing requirements and indicates understanding: Yes  COUNSELING:  Reviewed preventive health counseling, as " "reflected in patient instructions       Regular exercise       Healthy diet/nutrition       Vision screening       Hearing screening       Colon cancer screening       Prostate cancer screening    Estimated body mass index is 24.54 kg/m  as calculated from the following:    Height as of this encounter: 1.562 m (5' 1.5\").    Weight as of this encounter: 59.9 kg (132 lb).        He reports that he has been smoking cigarettes. He has a 20.00 pack-year smoking history. He has never used smokeless tobacco.  Tobacco Cessation Action Plan:   Information offered: Patient not interested at this time      Appropriate preventive services were discussed with this patient, including applicable screening as appropriate for cardiovascular disease, diabetes, osteopenia/osteoporosis, and glaucoma.  As appropriate for age/gender, discussed screening for colorectal cancer, prostate cancer, breast cancer, and cervical cancer. Checklist reviewing preventive services available has been given to the patient.    Reviewed patients plan of care and provided an AVS. The Intermediate Care Plan ( asthma action plan, low back pain action plan, and migraine action plan) for Nimco meets the Care Plan requirement. This Care Plan has been established and reviewed with the Patient.    Counseling Resources:  ATP IV Guidelines  Pooled Cohorts Equation Calculator  Breast Cancer Risk Calculator  Breast Cancer: Medication to Reduce Risk  FRAX Risk Assessment  ICSI Preventive Guidelines  Dietary Guidelines for Americans, 2010  [employer] MyPlate  ASA Prophylaxis  Lung CA Screening    Caleb Napier MD  [facility]    Identified Health Risks:  "

## 2020-11-09 NOTE — LETTER
[facility]  [address], [address]  [place] [address]  [phone]        November 11, 2020    Nimco NATA Zenia  [address] [place] [address]            Dear Mr. Nimco Knutson:      The results of your recent labs are normal except for Slightly high cholesterol. Keep diet and exercise. .  If you have any further questions or problems, please contact our office.    Sincerely,        Caleb Napier M.D.    Results for orders placed or performed in visit on 11/09/20   CBC with platelets     Status: Abnormal   Result Value Ref Range    WBC 5.6 4.0 - 11.0 10e9/L    RBC Count 4.94 4.4 - 5.9 10e12/L    Hemoglobin 15.2 13.3 - 17.7 g/dL    Hematocrit 46.5 40.0 - 53.0 %    MCV 94 78 - 100 fl    MCH 30.8 26.5 - 33.0 pg    MCHC 32.7 31.5 - 36.5 g/dL    RDW 12.5 10.0 - 15.0 %    Platelet Count 149 (L) 150 - 450 10e9/L   Comprehensive metabolic panel     Status: Abnormal   Result Value Ref Range    Sodium 138 133 - 144 mmol/L    Potassium 4.6 3.4 - 5.3 mmol/L    Chloride 107 94 - 109 mmol/L    Carbon Dioxide 27 20 - 32 mmol/L    Anion Gap 4 3 - 14 mmol/L    Glucose 101 (H) 70 - 99 mg/dL    Urea Nitrogen 18 7 - 30 mg/dL    Creatinine 0.89 0.66 - 1.25 mg/dL    GFR Estimate 87 >60 mL/min/[1.73_m2]    GFR Estimate If Black >90 >60 mL/min/[1.73_m2]    Calcium 9.5 8.5 - 10.1 mg/dL    Bilirubin Total 0.6 0.2 - 1.3 mg/dL    Albumin 3.8 3.4 - 5.0 g/dL    Protein Total 7.3 6.8 - 8.8 g/dL    Alkaline Phosphatase 99 40 - 150 U/L    ALT 25 0 - 70 U/L    AST 21 0 - 45 U/L   Lipid panel reflex to direct LDL Fasting     Status: Abnormal   Result Value Ref Range    Cholesterol 237 (H) <200 mg/dL    Triglycerides 79 <150 mg/dL    HDL Cholesterol 68 >39 mg/dL    LDL Cholesterol Calculated 153 (H) <100 mg/dL    Non HDL Cholesterol 169 (H) <130 mg/dL   TSH with free T4 reflex     Status: None   Result Value Ref Range    TSH 0.47 0.40 - 4.00 mU/L   Prostate spec antigen screen     Status: None   Result Value Ref  Range    PSA 2.37 0 - 4 ug/L   *UA reflex to Microscopic     Status: None   Result Value Ref Range    Color Urine Yellow     Appearance Urine Clear     Glucose Urine Negative NEG^Negative mg/dL    Bilirubin Urine Negative NEG^Negative    Ketones Urine Negative NEG^Negative mg/dL    Specific Gravity Urine 1.015 1.003 - 1.035    Blood Urine Negative NEG^Negative    pH Urine 7.0 5.0 - 7.0 pH    Protein Albumin Urine Negative NEG^Negative mg/dL    Urobilinogen Urine 0.2 0.2 - 1.0 EU/dL    Nitrite Urine Negative NEG^Negative    Leukocyte Esterase Urine Negative NEG^Negative    Source Midstream Urine

## 2020-11-10 LAB
ALBUMIN SERPL-MCNC: 3.8 G/DL (ref 3.4–5)
ALP SERPL-CCNC: 99 U/L (ref 40–150)
ALT SERPL W P-5'-P-CCNC: 25 U/L (ref 0–70)
ANION GAP SERPL CALCULATED.3IONS-SCNC: 4 MMOL/L (ref 3–14)
AST SERPL W P-5'-P-CCNC: 21 U/L (ref 0–45)
BILIRUB SERPL-MCNC: 0.6 MG/DL (ref 0.2–1.3)
BUN SERPL-MCNC: 18 MG/DL (ref 7–30)
CALCIUM SERPL-MCNC: 9.5 MG/DL (ref 8.5–10.1)
CHLORIDE SERPL-SCNC: 107 MMOL/L (ref 94–109)
CHOLEST SERPL-MCNC: 237 MG/DL
CO2 SERPL-SCNC: 27 MMOL/L (ref 20–32)
CREAT SERPL-MCNC: 0.89 MG/DL (ref 0.66–1.25)
GFR SERPL CREATININE-BSD FRML MDRD: 87 ML/MIN/{1.73_M2}
GLUCOSE SERPL-MCNC: 101 MG/DL (ref 70–99)
HDLC SERPL-MCNC: 68 MG/DL
LDLC SERPL CALC-MCNC: 153 MG/DL
NONHDLC SERPL-MCNC: 169 MG/DL
POTASSIUM SERPL-SCNC: 4.6 MMOL/L (ref 3.4–5.3)
PROT SERPL-MCNC: 7.3 G/DL (ref 6.8–8.8)
PSA SERPL-ACNC: 2.37 UG/L (ref 0–4)
SODIUM SERPL-SCNC: 138 MMOL/L (ref 133–144)
TRIGL SERPL-MCNC: 79 MG/DL
TSH SERPL DL<=0.005 MIU/L-ACNC: 0.47 MU/L (ref 0.4–4)

## 2020-11-19 ENCOUNTER — HOSPITAL ENCOUNTER (OUTPATIENT)
Dept: CT IMAGING | Facility: CLINIC | Age: 70
Discharge: HOME | End: 2020-11-19
Attending: INTERNAL MEDICINE | Admitting: INTERNAL MEDICINE
Payer: COMMERCIAL

## 2020-11-19 DIAGNOSIS — Z87.891 PERSONAL HISTORY OF TOBACCO USE, PRESENTING HAZARDS TO HEALTH: ICD-10-CM

## 2020-11-19 PROCEDURE — G0297 LDCT FOR LUNG CA SCREEN: HCPCS

## 2021-12-29 ENCOUNTER — OFFICE VISIT (OUTPATIENT)
Dept: INTERNAL MEDICINE | Facility: CLINIC | Age: 71
End: 2021-12-29
Payer: COMMERCIAL

## 2021-12-29 ENCOUNTER — ANCILLARY PROCEDURE (OUTPATIENT)
Dept: GENERAL RADIOLOGY | Facility: CLINIC | Age: 71
End: 2021-12-29
Attending: INTERNAL MEDICINE
Payer: COMMERCIAL

## 2021-12-29 VITALS
TEMPERATURE: 97.2 F | HEART RATE: 83 BPM | BODY MASS INDEX: 24.5 KG/M2 | OXYGEN SATURATION: 96 % | SYSTOLIC BLOOD PRESSURE: 119 MMHG | WEIGHT: 133.13 LBS | DIASTOLIC BLOOD PRESSURE: 71 MMHG | HEIGHT: 62 IN

## 2021-12-29 DIAGNOSIS — Z87.891 PERSONAL HISTORY OF TOBACCO USE, PRESENTING HAZARDS TO HEALTH: ICD-10-CM

## 2021-12-29 DIAGNOSIS — Z00.00 ENCOUNTER FOR PREVENTATIVE ADULT HEALTH CARE EXAMINATION: Primary | ICD-10-CM

## 2021-12-29 DIAGNOSIS — M25.561 RIGHT KNEE PAIN, UNSPECIFIED CHRONICITY: ICD-10-CM

## 2021-12-29 DIAGNOSIS — B18.1 HEPATITIS B CARRIER (H): ICD-10-CM

## 2021-12-29 DIAGNOSIS — E78.5 HYPERLIPIDEMIA LDL GOAL <130: ICD-10-CM

## 2021-12-29 DIAGNOSIS — Z23 NEED FOR TD VACCINE: ICD-10-CM

## 2021-12-29 DIAGNOSIS — Z00.00 ENCOUNTER FOR MEDICARE ANNUAL WELLNESS EXAM: ICD-10-CM

## 2021-12-29 DIAGNOSIS — Z12.11 COLON CANCER SCREENING: ICD-10-CM

## 2021-12-29 DIAGNOSIS — Z12.5 SCREENING FOR PROSTATE CANCER: ICD-10-CM

## 2021-12-29 LAB
ALBUMIN UR-MCNC: NEGATIVE MG/DL
APPEARANCE UR: CLEAR
BILIRUB UR QL STRIP: NEGATIVE
COLOR UR AUTO: YELLOW
ERYTHROCYTE [DISTWIDTH] IN BLOOD BY AUTOMATED COUNT: 12.4 % (ref 10–15)
GLUCOSE UR STRIP-MCNC: NEGATIVE MG/DL
HCT VFR BLD AUTO: 42.9 % (ref 40–53)
HGB BLD-MCNC: 14.3 G/DL (ref 13.3–17.7)
HGB UR QL STRIP: NEGATIVE
KETONES UR STRIP-MCNC: NEGATIVE MG/DL
LEUKOCYTE ESTERASE UR QL STRIP: NEGATIVE
MCH RBC QN AUTO: 31 PG (ref 26.5–33)
MCHC RBC AUTO-ENTMCNC: 33.3 G/DL (ref 31.5–36.5)
MCV RBC AUTO: 93 FL (ref 78–100)
NITRATE UR QL: NEGATIVE
PH UR STRIP: 7 [PH] (ref 5–7)
PLATELET # BLD AUTO: 144 10E3/UL (ref 150–450)
RBC # BLD AUTO: 4.62 10E6/UL (ref 4.4–5.9)
RBC #/AREA URNS AUTO: NORMAL /HPF
SP GR UR STRIP: 1.02 (ref 1–1.03)
UROBILINOGEN UR STRIP-ACNC: 0.2 E.U./DL
WBC # BLD AUTO: 6.9 10E3/UL (ref 4–11)
WBC #/AREA URNS AUTO: NORMAL /HPF

## 2021-12-29 PROCEDURE — 99397 PER PM REEVAL EST PAT 65+ YR: CPT | Mod: 25 | Performed by: INTERNAL MEDICINE

## 2021-12-29 PROCEDURE — 84443 ASSAY THYROID STIM HORMONE: CPT | Performed by: INTERNAL MEDICINE

## 2021-12-29 PROCEDURE — 80061 LIPID PANEL: CPT | Performed by: INTERNAL MEDICINE

## 2021-12-29 PROCEDURE — 81001 URINALYSIS AUTO W/SCOPE: CPT | Performed by: INTERNAL MEDICINE

## 2021-12-29 PROCEDURE — 90471 IMMUNIZATION ADMIN: CPT | Performed by: INTERNAL MEDICINE

## 2021-12-29 PROCEDURE — 80053 COMPREHEN METABOLIC PANEL: CPT | Performed by: INTERNAL MEDICINE

## 2021-12-29 PROCEDURE — 85027 COMPLETE CBC AUTOMATED: CPT | Performed by: INTERNAL MEDICINE

## 2021-12-29 PROCEDURE — 90714 TD VACC NO PRESV 7 YRS+ IM: CPT | Performed by: INTERNAL MEDICINE

## 2021-12-29 PROCEDURE — 73562 X-RAY EXAM OF KNEE 3: CPT | Mod: RT | Performed by: RADIOLOGY

## 2021-12-29 PROCEDURE — 36415 COLL VENOUS BLD VENIPUNCTURE: CPT | Performed by: INTERNAL MEDICINE

## 2021-12-29 PROCEDURE — G0103 PSA SCREENING: HCPCS | Performed by: INTERNAL MEDICINE

## 2021-12-29 ASSESSMENT — ENCOUNTER SYMPTOMS
HEARTBURN: 0
PARESTHESIAS: 0
ARTHRALGIAS: 1
CONSTIPATION: 0
EYE PAIN: 0
FEVER: 0
DIZZINESS: 0
WEAKNESS: 0
PALPITATIONS: 0
SHORTNESS OF BREATH: 0
HEADACHES: 0
NERVOUS/ANXIOUS: 0
SORE THROAT: 0
MYALGIAS: 0
HEMATOCHEZIA: 0
ABDOMINAL PAIN: 0
DYSURIA: 0
FREQUENCY: 0
JOINT SWELLING: 0
HEMATURIA: 0
NAUSEA: 0
DIARRHEA: 0
CHILLS: 0
COUGH: 1

## 2021-12-29 ASSESSMENT — ACTIVITIES OF DAILY LIVING (ADL): CURRENT_FUNCTION: NO ASSISTANCE NEEDED

## 2021-12-29 ASSESSMENT — MIFFLIN-ST. JEOR: SCORE: 1230.16

## 2021-12-29 NOTE — PROGRESS NOTES
"SUBJECTIVE:   Nimco Knutson is a 71 year old male who presents for Preventive Visit.      Patient has been advised of split billing requirements and indicates understanding: Yes   Are you in the first 12 months of your Medicare coverage?  No    Healthy Habits:     In general, how would you rate your overall health?  Excellent    Frequency of exercise:  2-3 days/week    Duration of exercise:  15-30 minutes    Do you usually eat at least 4 servings of fruit and vegetables a day, include whole grains    & fiber and avoid regularly eating high fat or \"junk\" foods?  No    Taking medications regularly:  Yes    Medication side effects:  None    Ability to successfully perform activities of daily living:  No assistance needed    Home Safety:  No safety concerns identified    Hearing Impairment:  No hearing concerns    In the past 6 months, have you been bothered by leaking of urine?  No    In general, how would you rate your overall mental or emotional health?  Excellent      PHQ-2 Total Score: 1    Additional concerns today:  No    Do you feel safe in your environment? Yes    Have you ever done Advance Care Planning? (For example, a Health Directive, POLST, or a discussion with a medical provider or your loved ones about your wishes): Yes, patient states has an Advance Care Planning document and will bring a copy to the clinic.       Fall risk  Fallen 2 or more times in the past year?: No  Any fall with injury in the past year?: Yes (Fell one time,  injuring Rt knee)    Cognitive Screening   1) Repeat 3 items (Leader, Season, Table)    2) Clock draw: NORMAL  3) 3 item recall: Recalls 2 objects   Results: NORMAL clock, 2 items recalled: COGNITIVE IMPAIRMENT LESS LIKELY    Mini-CogTM Copyright LEIDY Boyer. Licensed by the author for use in [facility]; reprinted with permission ([email or web address]). All rights reserved.      Do you have sleep apnea, excessive snoring or daytime drowsiness?: no    Reviewed and updated as " needed this visit by clinical staff  Tobacco  Allergies  Meds   Med Hx  Surg Hx  Fam Hx  Soc Hx       Reviewed and updated as needed this visit by Provider               Social History     Tobacco Use     Smoking status: Current Every Day Smoker     Packs/day: 0.50     Years: 40.00     Pack years: 20.00     Types: Cigarettes     Smokeless tobacco: Never Used     Tobacco comment: Less than 1/2 pack a day   Substance Use Topics     Alcohol use: No     Alcohol/week: 0.0 standard drinks     If you drink alcohol do you typically have >3 drinks per day or >7 drinks per week? No    Alcohol Use 12/29/2021   Prescreen: >3 drinks/day or >7 drinks/week? Not Applicable   Prescreen: >3 drinks/day or >7 drinks/week? -           PROBLEMS TO ADD ON...  Has pain in the right knee , post fall 6 months ago. Pain with walking.   No swelling, no redness.     Current providers sharing in care for this patient include:   Patient Care Team:  Caleb Napier MD as PCP - General (Internal Medicine)  Caleb Napier MD as Assigned PCP    The following health maintenance items are reviewed in Epic and correct as of today:  Health Maintenance Due   Topic Date Due     ANNUAL REVIEW OF  ORDERS  Never done     HEPATITIS C SCREENING  Never done     ZOSTER IMMUNIZATION (1 of 2) Never done     AORTIC ANEURYSM SCREENING (SYSTEM ASSIGNED)  Never done     DTAP/TDAP/TD IMMUNIZATION (3 - Td or Tdap) 08/13/2019     COVID-19 Vaccine (3 - Booster for Pfizer series) 10/11/2021     FALL RISK ASSESSMENT  11/09/2021     LUNG CANCER SCREENING  11/19/2021     Lab work is in process  Labs reviewed in EPIC          Review of Systems   Constitutional: Negative for chills and fever.   HENT: Negative for congestion, ear pain, hearing loss and sore throat.    Eyes: Negative for pain and visual disturbance.   Respiratory: Positive for cough. Negative for shortness of breath.    Cardiovascular: Negative for chest pain, palpitations and peripheral edema.  "  Gastrointestinal: Negative for abdominal pain, constipation, diarrhea, heartburn, hematochezia and nausea.   Genitourinary: Negative for dysuria, frequency, genital sores, hematuria, impotence, penile discharge and urgency.   Musculoskeletal: Positive for arthralgias. Negative for joint swelling and myalgias.   Skin: Negative for rash.   Neurological: Negative for dizziness, weakness, headaches and paresthesias.   Psychiatric/Behavioral: Negative for mood changes. The patient is not nervous/anxious.          OBJECTIVE:   /71 (BP Location: Left arm, Patient Position: Sitting, Cuff Size: Adult Regular)   Pulse 83   Temp 97.2  F (36.2  C) (Oral)   Ht 1.562 m (5' 1.5\")   Wt 60.4 kg (133 lb 2 oz)   SpO2 96%   BMI 24.75 kg/m   Estimated body mass index is 24.75 kg/m  as calculated from the following:    Height as of this encounter: 1.562 m (5' 1.5\").    Weight as of this encounter: 60.4 kg (133 lb 2 oz).  Physical Exam  GENERAL: healthy, alert and no distress  EYES: Eyes grossly normal to inspection, PERRL and conjunctivae and sclerae normal  HENT: ear canals and TM's normal, nose and mouth without ulcers or lesions  NECK: no adenopathy, no asymmetry, masses, or scars and thyroid normal to palpation  RESP: lungs clear to auscultation - no rales, rhonchi or wheezes  CV: regular rate and rhythm, normal S1 S2, no S3 or S4, no murmur, click or rub, no peripheral edema and peripheral pulses strong  ABDOMEN: soft, nontender, no hepatosplenomegaly, no masses and bowel sounds normal  MS: no gross musculoskeletal defects noted, no edema  SKIN: no suspicious lesions or rashes  NEURO: Normal strength and tone, mentation intact and speech normal  PSYCH: mentation appears normal, affect normal/bright    Diagnostic Test Results:  Labs reviewed in Epic    ASSESSMENT / PLAN:       ICD-10-CM    1. Encounter for preventative adult health care examination  Z00.00 CT Chest Lung Cancer Scrn Low Dose wo     Lipid panel reflex to " "direct LDL Non-fasting     CBC with platelets     Comprehensive metabolic panel (BMP + Alb, Alk Phos, ALT, AST, Total. Bili, TP)     PSA, screen     TSH with free T4 reflex     UA with Microscopic reflex to Culture - lab collect   2. Hepatitis B carrier (H)  B18.1    3. Colon cancer screening  Z12.11 Adult Gastro Ref - Procedure Only   4. Screening for prostate cancer  Z12.5 PSA, screen   5. Personal history of tobacco use, presenting hazards to health  Z87.891 CT Chest Lung Cancer Scrn Low Dose wo   6. Right knee pain, unspecified chronicity  M25.561 XR Knee Right 3 Views   7. Encounter for Medicare annual wellness exam  Z00.00        Patient has been advised of split billing requirements and indicates understanding: Yes  COUNSELING:  Reviewed preventive health counseling, as reflected in patient instructions       Regular exercise       Healthy diet/nutrition       Vision screening       Hearing screening       Colon cancer screening       Prostate cancer screening    Estimated body mass index is 24.75 kg/m  as calculated from the following:    Height as of this encounter: 1.562 m (5' 1.5\").    Weight as of this encounter: 60.4 kg (133 lb 2 oz).        He reports that he has been smoking cigarettes. He has a 20.00 pack-year smoking history. He has never used smokeless tobacco.  Tobacco Cessation Action Plan:   Information offered: Patient not interested at this time      Appropriate preventive services were discussed with this patient, including applicable screening as appropriate for cardiovascular disease, diabetes, osteopenia/osteoporosis, and glaucoma.  As appropriate for age/gender, discussed screening for colorectal cancer, prostate cancer, breast cancer, and cervical cancer. Checklist reviewing preventive services available has been given to the patient.    Reviewed patients plan of care and provided an AVS. The Intermediate Care Plan ( asthma action plan, low back pain action plan, and migraine action " plan) for Nimco meets the Care Plan requirement. This Care Plan has been established and reviewed with the Patient.    Counseling Resources:  ATP IV Guidelines  Pooled Cohorts Equation Calculator  Breast Cancer Risk Calculator  Breast Cancer: Medication to Reduce Risk  FRAX Risk Assessment  ICSI Preventive Guidelines  Dietary Guidelines for Americans, 2010  USDA's MyPlate  ASA Prophylaxis  Lung CA Screening    Caleb Napier MD  [facility]    Identified Health Risks:    The patient was counseled and encouraged to consider modifying their diet and eating habits. He was provided with information on recommended healthy diet options.  He is at risk for falling and has been provided with information to reduce the risk of falling at home.

## 2021-12-29 NOTE — LETTER
[facility]  [address], [address]  [place] [address]  [phone]        January 3, 2022    Nimco Knutson  [address]  [facility] [address]            Dear Mr. Nimco Knutson:      The results of your recent labs are normal except for borderline high cholesterol. Keep diet and exercise.     If you have any further questions or problems, please contact our office.    Sincerely,        Caleb Napier M.D.    Results for orders placed or performed in visit on 12/29/21   XR Knee Right 3 Views     Status: None    Narrative    EXAM: XR KNEE RIGHT 3 VIEWS  LOCATION: [facility]  DATE/TIME: 12/29/2021 4:49 PM    INDICATION:  Right knee pain, unspecified chronicity.  COMPARISON: None.      Impression    IMPRESSION: Mild tricompartmental osteoarthrosis. No fracture. Trace effusion. No calcified intra-articular body.   Results for orders placed or performed in visit on 12/29/21   Lipid panel reflex to direct LDL Non-fasting     Status: Abnormal   Result Value Ref Range    Cholesterol 209 (H) <200 mg/dL    Triglycerides 68 <150 mg/dL    Direct Measure HDL 61 >=40 mg/dL    LDL Cholesterol Calculated 134 (H) <=100 mg/dL    Non HDL Cholesterol 148 (H) <130 mg/dL    Patient Fasting > 8hrs? No     Narrative    Cholesterol  Desirable:  <200 mg/dL    Triglycerides  Normal:  Less than 150 mg/dL  Borderline High:  150-199 mg/dL  High:  200-499 mg/dL  Very High:  Greater than or equal to 500 mg/dL    Direct Measure HDL  Female:  Greater than or equal to 50 mg/dL   Male:  Greater than or equal to 40 mg/dL    LDL Cholesterol  Desirable:  <100mg/dL  Above Desirable:  100-129 mg/dL   Borderline High:  130-159 mg/dL   High:  160-189 mg/dL   Very High:  >= 190 mg/dL    Non HDL Cholesterol  Desirable:  130 mg/dL  Above Desirable:  130-159 mg/dL  Borderline High:  160-189 mg/dL  High:  190-219 mg/dL  Very High:  Greater than or equal to 220 mg/dL   CBC with platelets      Status: Abnormal   Result Value Ref Range    WBC Count 6.9 4.0 - 11.0 10e3/uL    RBC Count 4.62 4.40 - 5.90 10e6/uL    Hemoglobin 14.3 13.3 - 17.7 g/dL    Hematocrit 42.9 40.0 - 53.0 %    MCV 93 78 - 100 fL    MCH 31.0 26.5 - 33.0 pg    MCHC 33.3 31.5 - 36.5 g/dL    RDW 12.4 10.0 - 15.0 %    Platelet Count 144 (L) 150 - 450 10e3/uL   Comprehensive metabolic panel (BMP + Alb, Alk Phos, ALT, AST, Total. Bili, TP)     Status: Normal   Result Value Ref Range    Sodium 140 133 - 144 mmol/L    Potassium 4.5 3.4 - 5.3 mmol/L    Chloride 109 94 - 109 mmol/L    Carbon Dioxide (CO2) 27 20 - 32 mmol/L    Anion Gap 4 3 - 14 mmol/L    Urea Nitrogen 15 7 - 30 mg/dL    Creatinine 0.81 0.66 - 1.25 mg/dL    Calcium 8.9 8.5 - 10.1 mg/dL    Glucose 89 70 - 99 mg/dL    Alkaline Phosphatase 93 40 - 150 U/L    AST 20 0 - 45 U/L    ALT 24 0 - 70 U/L    Protein Total 7.6 6.8 - 8.8 g/dL    Albumin 3.9 3.4 - 5.0 g/dL    Bilirubin Total 0.4 0.2 - 1.3 mg/dL    GFR Estimate >90 >60 mL/min/1.73m2   PSA, screen     Status: Normal   Result Value Ref Range    Prostate Specific Antigen Screen 2.99 0.00 - 4.00 ug/L   TSH with free T4 reflex     Status: Normal   Result Value Ref Range    TSH 0.52 0.40 - 4.00 mU/L   UA with Microscopic reflex to Culture - lab collect     Status: Normal    Specimen: Urine, Midstream   Result Value Ref Range    Color Urine Yellow Colorless, Straw, Light Yellow, Yellow    Appearance Urine Clear Clear    Glucose Urine Negative Negative mg/dL    Bilirubin Urine Negative Negative    Ketones Urine Negative Negative mg/dL    Specific Gravity Urine 1.020 1.003 - 1.035    Blood Urine Negative Negative    pH Urine 7.0 5.0 - 7.0    Protein Albumin Urine Negative Negative mg/dL    Urobilinogen Urine 0.2 0.2, 1.0 E.U./dL    Nitrite Urine Negative Negative    Leukocyte Esterase Urine Negative Negative   Urine Microscopic     Status: Normal   Result Value Ref Range    RBC Urine 0-2 0-2 /HPF /HPF    WBC Urine None Seen 0-5 /HPF /HPF     Narrative    Urine Culture not indicated

## 2021-12-29 NOTE — PATIENT INSTRUCTIONS
Patient Education   Personalized Prevention Plan  You are due for the preventive services outlined below.  Your care team is available to assist you in scheduling these services.  If you have already completed any of these items, please share that information with your care team to update in your medical record.  Health Maintenance Due   Topic Date Due     ANNUAL REVIEW OF HM ORDERS  Never done     Hepatitis C Screening  Never done     Zoster (Shingles) Vaccine (1 of 2) Never done     AORTIC ANEURYSM SCREENING (SYSTEM ASSIGNED)  Never done     Diptheria Tetanus Pertussis (DTAP/TDAP/TD) Vaccine (3 - Td or Tdap) 08/13/2019     COVID-19 Vaccine (3 - Booster for Pfizer series) 10/11/2021     FALL RISK ASSESSMENT  11/09/2021     LUNG CANCER SCREENING  11/19/2021       Understanding USDA MyPlate  The USDA has guidelines to help you make healthy food choices. These are called MyPlate. MyPlate shows the food groups that make up healthy meals using the image of a place setting. Before you eat, think about the healthiest choices for what to put on your plate or in your cup or bowl. To learn more about building a healthy plate, visit www.choosemyplate.gov.    The food groups    Fruits. Any fruit or 100% fruit juice counts as part of the Fruit Group. Fruits may be fresh, canned, frozen, or dried, and may be whole, cut-up, or pureed. Make 1/2 of your plate fruits and vegetables.    Vegetables. Any vegetable or 100% vegetable juice counts as a member of the Vegetable Group. Vegetables may be fresh, frozen, canned, or dried. They can be served raw or cooked and may be whole, cut-up, or mashed. Make 1/2 of your plate fruits and vegetables.    Grains. All foods made from grains are part of the Grains Group. These include wheat, rice, oats, cornmeal, and barley. Grains are often used to make foods such as bread, pasta, oatmeal, cereal, tortillas, and grits. Grains should be no more than 1/4 of your plate. At least half of your  grains should be whole grains.    Protein. This group includes meat, poultry, seafood, beans and peas, eggs, processed soy products (such as tofu), nuts (including nut butters), and seeds. Make protein choices no more than 1/4 of your plate. Meat and poultry choices should be lean or low fat.    Dairy. The Dairy Group includes all fluid milk products and foods made from milk that contain calcium, such as yogurt and cheese. (Foods that have little calcium, such as cream, butter, and cream cheese, are not part of this group.) Most dairy choices should be low-fat or fat-free.    Oils. Oils aren't a food group, but they do contain essential nutrients. However it's important to watch your intake of oils. These are fats that are liquid at room temperature. They include canola, corn, olive, soybean, vegetable, and sunflower oil. Foods that are mainly oil include mayonnaise, certain salad dressings, and soft margarines. You likely already get your daily oil allowance from the foods you eat.  Things to limit  Eating healthy also means limiting these things in your diet:       Salt (sodium). Many processed foods have a lot of sodium. To keep sodium intake down, eat fresh vegetables, meats, poultry, and seafood when possible. Purchase low-sodium, reduced-sodium, or no-salt-added food products at the store. And don't add salt to your meals at home. Instead, season them with herbs and spices such as dill, oregano, cumin, and paprika. Or try adding flavor with lemon or lime zest and juice.    Saturated fat. Saturated fats are most often found in animal products such as beef, pork, and chicken. They are often solid at room temperature, such as butter. To reduce your saturated fat intake, choose leaner cuts of meat and poultry. And try healthier cooking methods such as grilling, broiling, roasting, or baking. For a simple lower-fat swap, use plain nonfat yogurt instead of mayonnaise when making potato salad or macaroni  salad.    Added sugars. These are sugars added to foods. They are in foods such as ice cream, candy, soda, fruit drinks, sports drinks, energy drinks, cookies, pastries, jams, and syrups. Cut down on added sugars by sharing sweet treats with a family member or friend. You can also choose fruit for dessert, and drink water or other unsweetened beverages.     [employer] last reviewed this educational content on 6/1/2020 2000-2021 [employer]. All rights reserved. This information is not intended as a substitute for professional medical care. Always follow your healthcare professional's instructions.          Preventing Falls at Home  A person can fall for many reasons. Older adults may fall because reaction time slows down as we age. Your muscles and joints may get stiff, weak, or less flexible because of illness, medicines, or a physical condition.   Other health problems that make falls more likely include:     Arthritis    Dizziness or lightheadedness when you stand up (orthostatic hypotension)    History of a stroke    Dizziness    Anemia    Certain medicines taken for mental illness or to control blood pressure.    Problems with balance or gait    Bladder or urinary problems    History of falling    Changes in vision (vision impairment)    Changes in thinking skills and memory (cognitive impairment)  Injuries from a fall can include serious injuries such as broken bones, dislocated joints, internal bleeding and cuts. Injuries like these can limit your independence.   Prevention tips  To help prevent falls and fall-related injuries, follow the tips below.    Floors  To make floors safer:     Put nonskid pads under area rugs.    Remove small rugs.    Replace worn floor coverings.    Tack carpets firmly to each step on carpeted stairs. Put nonskid strips on the edges of uncarpeted stairs.    Keep floors and stairs free of clutter and cords.    Arrange furniture so there are clear pathways.    Clean up  any spills right away.  Bathrooms    To make bathrooms safer:     Install grab bars in the tub or shower.    Apply nonskid strips or put a nonskid rubber mat in the tub or shower.    Sit on a bath chair to bathe.    Use bathmats with nonskid backing.  Lighting  To improve visibility in your home:      Keep a flashlight in each room. Or put a lamp next to the bed within easy reach.    Put nightlights in the bedrooms, hallways, kitchen, and bathrooms.    Make sure all stairways have good lighting.    Take your time when going up and down stairs.    Put handrails on both sides of stairs and in walkways for more support. To prevent injury to your wrist or arm, don t use handrails to pull yourself up.    Install grab bars to pull yourself up.    Move or rearrange items that you use often. This will make them easier to find or reach.    Look at your home to find any safety hazards. Especially look at doorways, walkways, and the driveway. Remove or repair any safety problems that you find.  Other changes to make    Look around to find any safety hazards. Look closely at doorways, walkways, and the driveway. Remove or repair any safety problems that you find.    Wear shoes that fit well.    Take your time when going up and down stairs.    Put handrails on both sides of stairs and in walkways for more support. To prevent injury to your wrist or arm, don t use handrails to pull yourself up.    Install grab bars wherever needed to pull yourself up.    Arrange items that you use often. This will make them easier to find or reach.    [employer] last reviewed this educational content on 3/1/2020    3817-9101 [employer]. All rights reserved. This information is not intended as a substitute for professional medical care. Always follow your healthcare professional's instructions.

## 2021-12-30 LAB
ALBUMIN SERPL-MCNC: 3.9 G/DL (ref 3.4–5)
ALP SERPL-CCNC: 93 U/L (ref 40–150)
ALT SERPL W P-5'-P-CCNC: 24 U/L (ref 0–70)
ANION GAP SERPL CALCULATED.3IONS-SCNC: 4 MMOL/L (ref 3–14)
AST SERPL W P-5'-P-CCNC: 20 U/L (ref 0–45)
BILIRUB SERPL-MCNC: 0.4 MG/DL (ref 0.2–1.3)
BUN SERPL-MCNC: 15 MG/DL (ref 7–30)
CALCIUM SERPL-MCNC: 8.9 MG/DL (ref 8.5–10.1)
CHLORIDE BLD-SCNC: 109 MMOL/L (ref 94–109)
CHOLEST SERPL-MCNC: 209 MG/DL
CO2 SERPL-SCNC: 27 MMOL/L (ref 20–32)
CREAT SERPL-MCNC: 0.81 MG/DL (ref 0.66–1.25)
FASTING STATUS PATIENT QL REPORTED: NO
GFR SERPL CREATININE-BSD FRML MDRD: >90 ML/MIN/1.73M2
GLUCOSE BLD-MCNC: 89 MG/DL (ref 70–99)
HDLC SERPL-MCNC: 61 MG/DL
LDLC SERPL CALC-MCNC: 134 MG/DL
NONHDLC SERPL-MCNC: 148 MG/DL
POTASSIUM BLD-SCNC: 4.5 MMOL/L (ref 3.4–5.3)
PROT SERPL-MCNC: 7.6 G/DL (ref 6.8–8.8)
PSA SERPL-MCNC: 2.99 UG/L (ref 0–4)
SODIUM SERPL-SCNC: 140 MMOL/L (ref 133–144)
TRIGL SERPL-MCNC: 68 MG/DL
TSH SERPL DL<=0.005 MIU/L-ACNC: 0.52 MU/L (ref 0.4–4)

## 2022-01-03 DIAGNOSIS — Z11.59 ENCOUNTER FOR SCREENING FOR OTHER VIRAL DISEASES: ICD-10-CM

## 2022-01-06 ENCOUNTER — HOSPITAL ENCOUNTER (OUTPATIENT)
Dept: CT IMAGING | Facility: CLINIC | Age: 72
Discharge: HOME | End: 2022-01-06
Attending: INTERNAL MEDICINE | Admitting: INTERNAL MEDICINE
Payer: COMMERCIAL

## 2022-01-06 DIAGNOSIS — Z87.891 PERSONAL HISTORY OF TOBACCO USE, PRESENTING HAZARDS TO HEALTH: ICD-10-CM

## 2022-01-06 DIAGNOSIS — Z00.00 ENCOUNTER FOR PREVENTATIVE ADULT HEALTH CARE EXAMINATION: ICD-10-CM

## 2022-01-06 PROCEDURE — 71271 CT THORAX LUNG CANCER SCR C-: CPT

## 2022-01-07 RX ORDER — ATORVASTATIN CALCIUM 10 MG/1
10 TABLET, FILM COATED ORAL DAILY
Qty: 90 TABLET | Refills: 3 | Status: SHIPPED | OUTPATIENT
Start: 2022-01-07 | End: 2023-01-06

## 2022-01-26 ENCOUNTER — LAB (OUTPATIENT)
Dept: LAB | Facility: CLINIC | Age: 72
End: 2022-01-26
Attending: INTERNAL MEDICINE
Payer: COMMERCIAL

## 2022-01-26 DIAGNOSIS — Z11.59 ENCOUNTER FOR SCREENING FOR OTHER VIRAL DISEASES: ICD-10-CM

## 2022-01-27 LAB — SARS-COV-2 RNA RESP QL NAA+PROBE: NEGATIVE

## 2022-01-27 PROCEDURE — U0003 INFECTIOUS AGENT DETECTION BY NUCLEIC ACID (DNA OR RNA); SEVERE ACUTE RESPIRATORY SYNDROME CORONAVIRUS 2 (SARS-COV-2) (CORONAVIRUS DISEASE [COVID-19]), AMPLIFIED PROBE TECHNIQUE, MAKING USE OF HIGH THROUGHPUT TECHNOLOGIES AS DESCRIBED BY CMS-2020-01-R: HCPCS

## 2022-01-27 PROCEDURE — U0005 INFEC AGEN DETEC AMPLI PROBE: HCPCS

## 2022-01-28 ENCOUNTER — HOSPITAL ENCOUNTER (OUTPATIENT)
Facility: CLINIC | Age: 72
Discharge: HOME | End: 2022-01-28
Attending: INTERNAL MEDICINE | Admitting: INTERNAL MEDICINE
Payer: COMMERCIAL

## 2022-01-28 VITALS
OXYGEN SATURATION: 97 % | DIASTOLIC BLOOD PRESSURE: 83 MMHG | BODY MASS INDEX: 25.28 KG/M2 | HEART RATE: 80 BPM | SYSTOLIC BLOOD PRESSURE: 123 MMHG | RESPIRATION RATE: 16 BRPM | WEIGHT: 136 LBS

## 2022-01-28 LAB — COLONOSCOPY: NORMAL

## 2022-01-28 PROCEDURE — 45385 COLONOSCOPY W/LESION REMOVAL: CPT | Mod: PT | Performed by: INTERNAL MEDICINE

## 2022-01-28 PROCEDURE — 250N000011 HC RX IP 250 OP 636: Performed by: INTERNAL MEDICINE

## 2022-01-28 PROCEDURE — G0500 MOD SEDAT ENDO SERVICE >5YRS: HCPCS | Performed by: INTERNAL MEDICINE

## 2022-01-28 PROCEDURE — 88305 TISSUE EXAM BY PATHOLOGIST: CPT | Mod: TC | Performed by: INTERNAL MEDICINE

## 2022-01-28 RX ORDER — ATROPINE SULFATE 0.4 MG/ML
0.4 AMPUL (ML) INJECTION
Status: DISCONTINUED | OUTPATIENT
Start: 2022-01-28 | End: 2022-01-28 | Stop reason: HOSPADM

## 2022-01-28 RX ORDER — NALOXONE HYDROCHLORIDE 0.4 MG/ML
0.2 INJECTION, SOLUTION INTRAMUSCULAR; INTRAVENOUS; SUBCUTANEOUS
Status: DISCONTINUED | OUTPATIENT
Start: 2022-01-28 | End: 2022-01-28 | Stop reason: HOSPADM

## 2022-01-28 RX ORDER — FLUMAZENIL 0.1 MG/ML
0.2 INJECTION, SOLUTION INTRAVENOUS
Status: DISCONTINUED | OUTPATIENT
Start: 2022-01-28 | End: 2022-01-28 | Stop reason: HOSPADM

## 2022-01-28 RX ORDER — NALOXONE HYDROCHLORIDE 0.4 MG/ML
0.4 INJECTION, SOLUTION INTRAMUSCULAR; INTRAVENOUS; SUBCUTANEOUS
Status: DISCONTINUED | OUTPATIENT
Start: 2022-01-28 | End: 2022-01-28 | Stop reason: HOSPADM

## 2022-01-28 RX ORDER — EPINEPHRINE 1 MG/ML
0.1 INJECTION, SOLUTION INTRAMUSCULAR; SUBCUTANEOUS
Status: DISCONTINUED | OUTPATIENT
Start: 2022-01-28 | End: 2022-01-28 | Stop reason: HOSPADM

## 2022-01-28 RX ORDER — SIMETHICONE 40MG/0.6ML
133 SUSPENSION, DROPS(FINAL DOSAGE FORM)(ML) ORAL
Status: DISCONTINUED | OUTPATIENT
Start: 2022-01-28 | End: 2022-01-28 | Stop reason: HOSPADM

## 2022-01-28 RX ORDER — FENTANYL CITRATE 0.05 MG/ML
25 INJECTION, SOLUTION INTRAMUSCULAR; INTRAVENOUS
Status: DISCONTINUED | OUTPATIENT
Start: 2022-01-28 | End: 2022-01-28 | Stop reason: HOSPADM

## 2022-01-28 RX ORDER — PROCHLORPERAZINE MALEATE 5 MG
5 TABLET ORAL EVERY 6 HOURS PRN
Status: DISCONTINUED | OUTPATIENT
Start: 2022-01-28 | End: 2022-01-28 | Stop reason: HOSPADM

## 2022-01-28 RX ORDER — ONDANSETRON 2 MG/ML
4 INJECTION INTRAMUSCULAR; INTRAVENOUS EVERY 6 HOURS PRN
Status: DISCONTINUED | OUTPATIENT
Start: 2022-01-28 | End: 2022-01-28 | Stop reason: HOSPADM

## 2022-01-28 RX ORDER — FENTANYL CITRATE 0.05 MG/ML
25-50 INJECTION, SOLUTION INTRAMUSCULAR; INTRAVENOUS
Status: COMPLETED | OUTPATIENT
Start: 2022-01-28 | End: 2022-01-28

## 2022-01-28 RX ORDER — LIDOCAINE 40 MG/G
CREAM TOPICAL
Status: DISCONTINUED | OUTPATIENT
Start: 2022-01-28 | End: 2022-01-28 | Stop reason: HOSPADM

## 2022-01-28 RX ORDER — ONDANSETRON 2 MG/ML
4 INJECTION INTRAMUSCULAR; INTRAVENOUS
Status: DISCONTINUED | OUTPATIENT
Start: 2022-01-28 | End: 2022-01-28 | Stop reason: HOSPADM

## 2022-01-28 RX ORDER — ONDANSETRON 4 MG/1
4 TABLET, ORALLY DISINTEGRATING ORAL EVERY 6 HOURS PRN
Status: DISCONTINUED | OUTPATIENT
Start: 2022-01-28 | End: 2022-01-28 | Stop reason: HOSPADM

## 2022-01-28 RX ADMIN — FENTANYL CITRATE 50 MCG: 0.05 INJECTION, SOLUTION INTRAMUSCULAR; INTRAVENOUS at 09:11

## 2022-01-28 RX ADMIN — FENTANYL CITRATE 50 MCG: 0.05 INJECTION, SOLUTION INTRAMUSCULAR; INTRAVENOUS at 09:05

## 2022-01-28 RX ADMIN — MIDAZOLAM 2 MG: 1 INJECTION INTRAMUSCULAR; INTRAVENOUS at 09:05

## 2022-01-28 NOTE — DISCHARGE INSTRUCTIONS
The patient has received a copy of the Provation  report the doctor has written and discharge instructions have been discussed with the patient and responsible adult.  All questions were addressed and answered prior to patient discharge.    Understanding Colon and Rectal Polyps     The colon has a smooth lining composed of millions of cells.     The colon (also called the large intestine) is a muscular tube that forms the last part of the digestive tract. It absorbs water and stores food waste. The colon is about 4 to 6 feet long. The rectum is the last 6 inches of the colon. The colon and rectum have a smooth lining composed of millions of cells. Changes in these cells can lead to growths in the colon that can become cancerous and should be removed.     When the Colon Lining Changes  Changes that occur in the cells that line the colon or rectum can lead to growths called polyps. Over a period of years, polyps can turn cancerous. Removing polyps early may prevent cancer from ever forming.      Polyps  Polyps are fleshy clumps of tissue that form on the lining of the colon or rectum. Small polyps are usually benign (not cancerous). However, over time, cells in a polyp can change and become cancerous. The larger a polyp grows, the more likely this is to happen. Also, certain types of polyps known as adenomatous polyps are considered premalignant. This means that they will almost always become cancerous if they re not removed.          Cancer  Almost all colorectal cancers start when polyp cells begin growing abnormally. As a cancerous tumor grows, it may involve more and more of the colon or rectum. In time, cancer can also grow beyond the colon or rectum and spread to nearby organs or to glands called lymph nodes. The cells can also travel to other parts of the body. This is known as metastasis. The earlier a cancerous tumor is removed, the better the chance of preventing its spread.        0538-2929 [facility] [facility],  [address] [place] [address]. All rights reserved. This information is not intended as a substitute for professional medical care. Always follow your healthcare professional's instructions.    Understanding Diverticulosis and Diverticulitis     Pouches or diverticula usually occur in the lower part of the colon called the sigmoid.      Diverticulitis occurs when the pouches become inflamed.     The colon (large intestine) is the last part of the digestive tract. It absorbs water from stool and changes it from a liquid to a solid. In certain cases, small pouches called diverticula can form in the colon wall. This condition is called diverticulosis. The pouches can become infected. If this happens, it becomes a more serious problem called diverticulitis. These problems can be painful. But they can be managed.   Managing Your Condition  Diet changes or taking medications are often tried first. These may be enough to bring relief. If the case is bad, surgery may be done. You and your doctor can discuss the plan that is best for you.  If You Have Diverticulosis  Diet changes are often enough to control symptoms. The main changes are adding fiber (roughage) and drinking more water. Fiber absorbs water as it travels through your colon. This helps your stool stay soft and move smoothly. Water helps this process. If needed, you may be told to take over-the-counter stool softeners. To help relieve pain, antispasmodic medications may be prescribed.  If You Have Diverticulitis  Treatment depends on how bad your symptoms are.  For mild symptoms: You may be put on a liquid diet for a short time. You may also be prescribed antibiotics. If these two steps relieve your symptoms, you may then be prescribed a high-fiber diet. If you still have symptoms, your doctor will discuss further treatment options with you.  For severe symptoms: You may need to be admitted to the hospital. There, you can be given IV antibiotics and  fluids. Once symptoms are under control, the above treatments may be tried. If these don t control your condition, your doctor may discuss the option of having surgery with you.  [place] to Colon Health  Help keep your colon healthy with a diet that includes plenty of high-fiber fruits, vegetables, and whole grains. Drink plenty of liquids like water and juice. Your doctor may also recommend avoiding seeds and nuts.          6002-1746 [facility], [address] [place] [address], [place] [address]. All rights reserved. This information is not intended as a substitute for professional medical care. Always follow your healthcare professional's instructions.    HIGH FIBER DIET  Fiber is present in all fruits, vegetables, cereals and grains. Fiber passes through the body undigested. A high fiber diet helps food move through the intestinal tract. The added bulk is helpful in preventing constipation. In people with diverticulosis it serves to clean out the pouches along the colon wall while preventing new ones from forming. A high fiber diet also reduces the risk of colon cancer, decreases blood cholesterol and prevents high blood sugar in people with diabetes.    The foods listed below are high in fiber and should be included in your diet. If you are not used to high fiber foods, start with 1 or 2 foods from this list. Every 3-4 days add a new one to your diet until you are eating 4 high fiber foods per day. This should give you 20-35 Gm of fiber/day. It is also important to drink a lot of water when you are on this diet (6-8 glasses a day). Water causes the fiber to swell and increases the benefit.    FOODS HIGH IN DIETARY FIBER:  BREADS: Made with 100% whole wheat flour; marce, wheat or rye crackers; tortillas, bran muffins  CEREALS: Whole grain cereal with bran (Chex, Raisin Bran, Corn Bran), oatmeal, rolled oats, granola, wheat flakes, brown rice  NUTS: Any nuts  FRUITS: All fresh fruits along with edible skins, (bananas,  citrus fruit, mangoes, pears, prunes, raisins, apples, pineapple, apricot, melon, jams and marmalades), fruit juices (especially prune juice)  VEGETABLES: All types, preferably raw or lightly cooked: especially, celery, eggplant, potatoes, spinach, broccoli, brussel sprouts, winter squash, carrots, cauliflower, soybeans, lentils, fresh and dried beans of all kinds  OTHER: Popcorn, any spices      8840-1910 [facility], [address] [place] [address]. All rights reserved. This information is not intended as a substitute for professional medical care. Always follow your healthcare professional's instructions.

## 2022-01-28 NOTE — H&P
Pre-Endoscopy History and Physical     Nimco Knutson MRN# [id number]   YOB: 1950 Age: 71 year old     Date of Procedure: 1/28/2022  Primary care provider: Caleb Napier  Type of Endoscopy: colonoscopy  Reason for Procedure: surveillance  Type of Anesthesia Anticipated: Conscious Sedation    HPI:    Nimco is a 71 year old male who will be undergoing the above procedure.      A history and physical has been performed. The patient's medications and allergies have been reviewed. The risks and benefits of the procedure and the sedation options and risks were discussed with the patient.  All questions were answered and informed consent was obtained.      He denies a personal or family history of anesthesia complications or bleeding disorders.     Patient Active Problem List   Diagnosis     Hepatitis B carrier (H)     Personal history of tobacco use, presenting hazards to health     Pure hypercholesterolemia     Hyperlipidemia LDL goal <130     Advanced directives, counseling/discussion     Spermatocele        Past Medical History:   Diagnosis Date     Hepatitis B carrier (H)      Mumps      Sciatica         Past Surgical History:   Procedure Laterality Date     SPERMATOCELECTOMY Left 6/5/2018    Procedure: SPERMATOCELECTOMY;  Left spermatocelectomy;  Surgeon: Nghia Uriostegui MD;  Location:  OR     [facility] W/O FACETEC FORAMOT/[facility] 1/2 VRT SEG, LUMBAR  5/04    L4-5 disc R       Relevant Family History: NONE    Relevant Social History: NONE     Prior to Admission medications    Medication Sig Start Date End Date Taking? Authorizing Provider   atorvastatin (LIPITOR) 10 MG tablet Take 1 tablet (10 mg) by mouth daily 1/7/22   Caleb Napier MD       Allergies   Allergen Reactions     No Known Drug Allergies         REVIEW OF SYSTEMS:   A relevant review of systems was performed and was negative    PHYSICAL EXAM:   /85   Pulse 84   Resp 12   Wt 61.7 kg (136 lb)   SpO2 98%   BMI  "25.28 kg/m   Estimated body mass index is 25.28 kg/m  as calculated from the following:    Height as of 12/29/21: 1.562 m (5' 1.5\").    Weight as of this encounter: 61.7 kg (136 lb).   GENERAL APPEARANCE: alert, and oriented  MENTAL STATUS: alert  AIRWAY EXAM: Normal  RESP: lungs clear to auscultation - no rales, rhonchi or wheezes  CV: regular rates and rhythm  DIAGNOSTICS:    Not indicated    IMPRESSION   ASA Class 2 - Mild systemic disease    PLAN:   Plan for colonoscopy. We discussed the risks, benefits and alternatives and the patient wished to proceed.      Signed Electronically by: Arias Huff MD  January 28, 2022            "

## 2022-01-28 NOTE — LETTER
January 10, 2022      Nimco Knutson  [address]  [place] [address]        Dear Nimco,     Please be aware that coverage of these services is subject to the terms and limitations of your health insurance plan.  Call member services at your health plan with any benefit or coverage questions.    Thank you for choosing [facility]. You are scheduled for the following service(s):    Date:  1/28/22             Procedure:  COLONOSCOPY  Doctor:        Dr. Huff    Arrival Time:  8:05 AM  *Enter and check in at the Main Hospital Entrance*  Procedure Time:  8:50 AM      Location:   [facility]        Endoscopy Department, First Floor         [address] [place], [place] [address] [phone] or [phone] ([facility]) to reschedule      MIRALAX -GATORADE  PREP  Colonoscopy is the most accurate test to detect colon polyps and colon cancer; and the only test where polyps can be removed. During this procedure, a doctor examines the lining of your large intestine and rectum through a flexible tube.   Transportation  You must arrange for a ride for the day of your procedure with a responsible adult. A taxi , Uber, etc, is not an option unless you are accompanied by a responsible adult. If you fail to arrange transportation with a responsible adult, your procedure will be cancelled and rescheduled.    Purchase the  following supplies at your local pharmacy:  - 2 (two) bisacodyl tablets: each tablet contains 5 mg.  (Dulcolax  laxative NOT Dulcolax  stool softener)   - 1 (one) 8.3 oz bottle of Polyethylene Glycol (PEG) 3350 Powder   (MiraLAX , Smooth LAX , ClearLAX  or equivalent)  - 64 oz Gatorade    Regular Gatorade, Gatorade G2 , Powerade , Powerade Zero  or Pedialyte  is acceptable. Red colored flavors are not allowed; all other colors (yellow, green, orange, purple and blue) are okay. It is also okay to buy two 2.12 oz packets of powdered Gatorade that can be  mixed with water to a total volume of 64 oz of liquid.  - 1 (one) 10 oz bottle of Magnesium Citrate (Red colored flavors are not allowed)  It is also okay for you to use a 0.5 oz package of powdered magnesium citrate (17 g) mixed with 10 oz of water.    PREPARATION FOR COLONOSCOPY  7 days before:    Discontinue fiber supplements and medications containing iron. This includes Metamucil  and Fibercon ; and multivitamins with iron.    3 days before:    Begin a low-fiber diet. A low-fiber diet helps making the cleanout more effective.     Examples of a low-fiber diet include (but are not limited to): white bread, white rice, pasta, crackers, fish, chicken, eggs, ground beef, creamy peanut butter, cooked/steamed/boiled vegetables, canned fruit, bananas, melons, milk, plain yogurt cheese, salad dressing and other condiments.     The following are not allowed on a low-fiber diet: seeds, nuts, popcorn, bran, whole wheat, corn, quinoa, raw fruits and vegetables, berries and dried fruit, beans and lentils.    For additional details on low-fiber diet, please refer to the table on the last page.    2 days before:    Continue the low-fiber diet.     Drink at least 8 glasses of water throughout the day.     Stop eating solid foods at 11:45 pm.    1 day before:    In the morning: begin a clear liquid diet (liquids you can see through).     Examples of a clear liquid diet include: water, clear broth or bouillon, Gatorade, Pedialyte or Powerade, carbonated and non-carbonated soft drinks (Sprite , 7-Up , ginger ale), strained fruit juices without pulp (apple, white grape, white cranberry), Jell-O  and popsicles.     The following are not allowed on a clear liquid diet: red liquids, alcoholic beverages, dairy products (milk, creamer, and yogurt), protein shakes, creamy broths, juice with pulp and chewing tobacco.    At noon: take 2 (two) bisacodyl tablets     At 4 (and no later than 6pm): start drinking the Miralax-Gatorade  preparation (8.3 oz of Miralax mixed with 64 oz of Gatorade in a large pitcher). Drink 1(one) 8 oz glass every 15 minutes thereafter, until the mixture is gone.    COLON CLEANSING TIPS: drink adequate amounts of fluids before and after your colon cleansing to prevent dehydration. Stay near a toilet because you will have diarrhea. Even if you are sitting on the toilet, continue to drink the cleansing solution every 15 minutes. If you feel nauseous or vomit, rinse your mouth with water, take a 15 to 30-minute-break and then continue drinking the solution. You will be uncomfortable until the stool has flushed from your colon (in about 2 to 4 hours). You may feel chilled.    Day of your procedure  You may take all of your morning medications including blood pressure medications, blood thinners (if you have not been instructed to stop these by our office), methadone, anti-seizure medications with sips of water 3 hours prior to your procedure or earlier. Do not take insulin or vitamins prior to your procedure. Continue the clear liquid diet.       4 hours prior: drink 10 oz of magnesium citrate. It may be easier to drink it with a straw.    STOP consuming all liquids after that.     Do not take anything by mouth during this time.     Allow extra time to travel to your procedure as you may need to stop and use a restroom along the way.    You are ready for the procedure, if you followed all instructions and your stool is no longer formed, but clear or yellow liquid. If you are unsure whether your colon is clean, please call our office at [phone] before you leave for your appointment.    Bring the following to your procedure:  - Insurance Card/Photo ID.   - List of current medications including over-the-counter medications and supplements.   - Your rescue inhaler if you currently use one to control asthma.    Canceling or rescheduling your appointment:   If you must cancel or reschedule your appointment, please call  [phone] as soon as possible.    COLONOSCOPY PRE-PROCEDURE CHECKLIST    If you have diabetes, ask your regular doctor for diet and medication restrictions.  If you take an anticoagulant or anti-platelet medication (such as Coumadin , Lovenox , Pradaxa , Xarelto , Eliquis , etc.), please call your primary doctor for advice on holding this medication.  If you take aspirin you may continue to do so.  If you are or may be pregnant, please discuss the risks and benefits of this procedure with your doctor.      What happens during a colonoscopy?    Plan to spend up to two hours, starting at registration time, at the endoscopy center the day of your procedure. The colonoscopy takes an average of 15 to 30 minutes. Recovery time is about 30 minutes.      Before the exam:    You will change into a gown.    Your medical history and medication list will be reviewed with you, unless that has been done over the phone prior to the procedure.     A nurse will insert an intravenous (IV) line into your hand or arm.    The doctor will meet with you and will give you a consent form to sign.  During the exam:     Medicine will be given through the IV line to help you relax.     Your heart rate and oxygen levels will be monitored. If your blood pressure is low, you may be given fluids through the IV line.     The doctor will insert a flexible hollow tube, called a colonoscope, into your rectum. The scope will be advanced slowly through the large intestine (colon).    You may have a feeling of fullness or pressure.     If an abnormal tissue or a polyp is found, the doctor may remove it through the endoscope for closer examination, or biopsy. Tissue removal is painless    After the exam:           Any tissue samples removed during the exam will be sent to a lab for evaluation. It may take 5-7 working days for you to be notified of the results.     A nurse will provide you with complete discharge instructions before you leave the  endoscopy center. Be sure to ask the nurse for specific instructions if you take blood thinners such as Aspirin, Coumadin or Plavix.     The doctor will prepare a full report for you and for the physician who referred you for the procedure.     Your doctor will talk with you about the initial results of your exam.      Medication given during the exam will prohibit you from driving for the rest of the day.     Following the exam, you may resume your normal diet. Your first meal should be light, no greasy foods. Avoid alcohol until the next day.     You may resume your regular activities the day after the procedure.         LOW-FIBER DIET    Foods RECOMMENDED Foods to AVOID   Breads, Cereal, Rice and Pasta:   White bread, rolls, biscuits, croissant and paulina toast.   Waffles, Ivorian toast and pancakes.   White rice, noodles, pasta, macaroni and peeled cooked potatoes.   Plain crackers and saltines.   Cooked cereals: farina, cream of rice.   Cold cereals: Puffed Rice , Rice Krispies , Corn Flakes  and Special K    Breads, Cereal, Rice and Pasta:   Breads or rolls with nuts, seeds or fruit.   Whole wheat, pumpernickel, rye breads and cornbread.   Potatoes with skin, brown or wild rice, and kasha (buckwheat).     Vegetables:   Tender cooked and canned vegetables without seeds: carrots, asparagus tips, green or wax beans, pumpkin, spinach, lima beans. Vegetables:   Raw or steamed vegetables.   Vegetables with seeds.   Sauerkraut.   Winter squash, peas, broccoli, Brussel sprouts, cabbage, onions, cauliflower, baked beans, peas and corn.   Fruits:   Strained fruit juice.   Canned fruit, except pineapple.   Ripe bananas and melon. Fruits:   Prunes and prune juice.   Raw fruits.   Dried fruits: figs, dates and raisins.   Milk/Dairy:   Milk: plain or flavored.   Yogurt, custard and ice cream.   Cheese and cottage cheese Milk/Dairy:     Meat and other proteins:   ground, well-cooked tender beef, lamb, ham, veal, pork, fish,  poultry and organ meats.   Eggs.   Peanut butter without nuts. Meat and other proteins:   Tough, fibrous meats with gristle.   Dry beans, peas and lentils.   Peanut butter with nuts.   Tofu.   Fats, Snack, Sweets, Condiments and Beverages:   Margarine, butter, oils, mayonnaise, sour cream and salad dressing, plain gravy.   Sugar, hard candy, clear jelly, honey and syrup.   Spices, cooked herbs, bouillon, broth and soups made with allowed vegetable, ketchup and mustard.   Coffee, tea and carbonated drinks.   Plain cakes, cookies and pretzels.   Gelatin, plain puddings, custard, ice cream, sherbet and popsicles. Fats, Snack, Sweets, Condiments and Beverages:   Nuts, seeds and coconut.   Jam, marmalade and preserves.   Pickles, olives, relish and horseradish.   All desserts containing nuts, seeds, dried fruit and coconut; or made from whole grains or bran.   Candy made with nuts or seeds.   Popcorn.     DIRECTIONS TO THE ENDOSCOPY DEPARTMENT    From the north ([facility])  Take [address] South, exit on [address] [address]. Get into the left hand edyta, turn left (east), go one-half mile to [address] and turn left. Go north to the second stoplight, take a right on [address] and follow it to the Main Hospital entrance.    From the south ([facility])  Take 35N to the 35E split and exit on [address] [address]. On [address] [address], turn left (west) to [address]. Turn right (north) on [address]. Go north to the second stoplight, take a right on [address] and follow it to the Main Hospital entrance.    From the east via 35E ([facility])  Take [address]E south to [address] [address] exit. Turn right on [address] [address]. Go west to [address]. Turn right (north) on [address]. Go to the second stoplight, take a right on [address] to the Main Hospital entrance.    From the east via [address] ([facility])  Take [address] to [address]. Turn left  (south) on [address] to [address], turn left (east) on [address] and follow it to the Main Hospital entrance.    From the west via [address] ([place], [place])  Take [address] east to [address]. Turn right (south) on [address] to [address], turn left (east) on [address] and follow it to the Main Hospital entrance.

## 2022-01-31 LAB
PATH REPORT.COMMENTS IMP SPEC: NORMAL
PATH REPORT.COMMENTS IMP SPEC: NORMAL
PATH REPORT.FINAL DX SPEC: NORMAL
PATH REPORT.GROSS SPEC: NORMAL
PATH REPORT.MICROSCOPIC SPEC OTHER STN: NORMAL
PATH REPORT.RELEVANT HX SPEC: NORMAL
PHOTO IMAGE: NORMAL

## 2022-01-31 PROCEDURE — 88305 TISSUE EXAM BY PATHOLOGIST: CPT | Mod: 26 | Performed by: PATHOLOGY

## 2022-09-02 ENCOUNTER — OFFICE VISIT (OUTPATIENT)
Dept: INTERNAL MEDICINE | Facility: CLINIC | Age: 72
End: 2022-09-02
Payer: COMMERCIAL

## 2022-09-02 VITALS
OXYGEN SATURATION: 98 % | SYSTOLIC BLOOD PRESSURE: 120 MMHG | RESPIRATION RATE: 16 BRPM | DIASTOLIC BLOOD PRESSURE: 65 MMHG | WEIGHT: 134 LBS | HEART RATE: 87 BPM | BODY MASS INDEX: 22.88 KG/M2 | HEIGHT: 64 IN | TEMPERATURE: 97.2 F

## 2022-09-02 DIAGNOSIS — M54.41 MIDLINE LOW BACK PAIN WITH RIGHT-SIDED SCIATICA, UNSPECIFIED CHRONICITY: ICD-10-CM

## 2022-09-02 DIAGNOSIS — R05.9 COUGH: ICD-10-CM

## 2022-09-02 DIAGNOSIS — M25.561 RIGHT KNEE PAIN, UNSPECIFIED CHRONICITY: Primary | ICD-10-CM

## 2022-09-02 DIAGNOSIS — B18.1 HEPATITIS B CARRIER (H): ICD-10-CM

## 2022-09-02 PROCEDURE — 99214 OFFICE O/P EST MOD 30 MIN: CPT | Performed by: INTERNAL MEDICINE

## 2022-09-02 RX ORDER — BENZONATATE 200 MG/1
200 CAPSULE ORAL 3 TIMES DAILY PRN
Qty: 30 CAPSULE | Refills: 0 | Status: SHIPPED | OUTPATIENT
Start: 2022-09-02 | End: 2023-06-28

## 2022-09-02 ASSESSMENT — PATIENT HEALTH QUESTIONNAIRE - PHQ9
SUM OF ALL RESPONSES TO PHQ QUESTIONS 1-9: 2
SUM OF ALL RESPONSES TO PHQ QUESTIONS 1-9: 2
10. IF YOU CHECKED OFF ANY PROBLEMS, HOW DIFFICULT HAVE THESE PROBLEMS MADE IT FOR YOU TO DO YOUR WORK, TAKE CARE OF THINGS AT HOME, OR GET ALONG WITH OTHER PEOPLE: NOT DIFFICULT AT ALL

## 2022-09-02 NOTE — PROGRESS NOTES
Assessment & Plan     Right knee pain, unspecified chronicity  Has h/o OA, will assess with ortho  Continue NSAID prn  - Orthopedic  Referral; Future    Midline low back pain with right-sided sciatica, unspecified chronicity  Assess X rays, consider PT  - XR Lumbar Spine 2/3 Views; Future    Cough   smoking cessation advised  Assess CXR and PFT  - XR Chest 2 Views; Future  - General PFT Lab (Please always keep checked); Future  - Pulmonary Function Test; Future  - benzonatate (TESSALON) 200 MG capsule; Take 1 capsule (200 mg) by mouth 3 times daily as needed for cough             See Patient Instructions    Return in about 4 months (around 1/2/2023) for Physical Exam.    Caleb Napier MD  [facility] is a 71 year old, presenting for the following health issues:  Pain (Right leg pain )      Pain    History of Present Illness       Reason for visit:  Knee hurt and long time cough    He eats 0-1 servings of fruits and vegetables daily.He consumes 1 sweetened beverage(s) daily.He exercises with enough effort to increase his heart rate 10 to 19 minutes per day.  He exercises with enough effort to increase his heart rate 3 or less days per week.   He is taking medications regularly.    Today's PHQ-9         PHQ-9 Total Score: 2    PHQ-9 Q9 Thoughts of better off dead/self-harm past 2 weeks :   Not at all    How difficult have these problems made it for you to do your work, take care of things at home, or get along with other people: Not difficult at all       Right leg pain x 2 months   Presents with right knee pain, radiates to the calf and thigh.   Also has LBP, radiates to the right leg, no weakness or numbness.   Has h/o DDD in the past.   Symptoms are worse with walking, driving, sometimes has it at night.   Takes Advil, not sure if helping.   Has h/o back surgery for prolapsed disc in 2008    Has h/o chronic cough. Producing small amount of phlegm, clear.  No CP. Some times feels SOB. No fever. Able to walk.   Has h/o smoking.       Review of Systems   Constitutional, HEENT, cardiovascular, pulmonary, gi and gu systems are negative, except as otherwise noted.    Has h/o hep B, carrier. No active disease.       Objective    There were no vitals taken for this visit.  There is no height or weight on file to calculate BMI.  Physical Exam   GENERAL: healthy, alert and no distress  NECK: no adenopathy, no asymmetry, masses, or scars and thyroid normal to palpation  RESP: lungs clear to auscultation - no rales, rhonchi or wheezes  CV: regular rate and rhythm, normal S1 S2, no S3 or S4, no murmur, click or rub, no peripheral edema and peripheral pulses strong  MS: no gross musculoskeletal defects noted, no edema, right medial knee pain , normal ROM, LS spine palpatory tender   NEURO: Normal strength and tone, mentation intact and speech normal    Admission on 01/28/2022, Discharged on 01/28/2022   Component Date Value Ref Range Status     Case Report 01/28/2022    Final                    Value:Surgical Pathology Report                         Case: [id number]                                  Authorizing Provider:  Arias Huff MD          Collected:           01/28/2022 09:24 AM          Ordering Location:     [facility]          Received:            01/28/2022 10:12 AM                                 [facility] [place]                                                         Pathologist:           Janey Farias                                                               Specimen:    Large Intestine, Colon, Sigmoid, sigmoid colon polyp                                        Final Diagnosis 01/28/2022    Final                    Value:This result contains rich text formatting which cannot be displayed here.     Clinical Information 01/28/2022    Final                    Value:This result contains rich text formatting which cannot be displayed here.     Gross  Description 01/28/2022    Final                    Value:This result contains rich text formatting which cannot be displayed here.     Microscopic Description 01/28/2022    Final                    Value:This result contains rich text formatting which cannot be displayed here.     Performing Labs 01/28/2022    Final                    Value:This result contains rich text formatting which cannot be displayed here.     COLONOSCOPY 01/28/2022    Final                    Value:[facility]  _______________________________________________________________________________  Patient Name: Nimco Knutson          Procedure Date: 1/28/2022 8:55 AM  MRN: [id number]                       Account Number: [id number]  YOB: 1950              Admit Type: Outpatient  Age: 71                               Gender: Male  Attending MD: Arias Huff MD         Total Sedation Time: _20_ minutes of   continuous bedside 1:1  Instrument Name: 223 - Adult Colonoscope   _______________________________________________________________________________     Procedure:                Colonoscopy  Indications:              High risk colon cancer surveillance: Personal                             history of colonic polyps  Providers:                Arias Huff MD (Doctor)  Referring MD:               Medicines:                Midazolam 2 mg IV, Fentanyl 100 micrograms IV  Complications:            No immediate complications.  _____________________________                          __________________________________________________  Procedure:                Pre-Anesthesia Assessment:                            - Prior to the procedure, a History and Physical                             was performed, and patient medications and                             allergies were reviewed. The patient is competent.                             The risks and benefits of the procedure and the                             sedation  options and risks were discussed with the                             patient. All questions were answered and informed                             consent was obtained. Patient identification and                             proposed procedure were verified by the physician                             in the procedure room. Mental Status Examination:                             alert and oriented. Airway Examination: normal                             oropharyngeal airway and neck mobility. Respiratory                             Examination: clear t                          o auscultation. CV Examination:                             regular rate and rhythm. ASA Grade Assessment: II -                             A patient with mild systemic disease. After                             reviewing the risks and benefits, the patient was                             deemed in satisfactory condition to undergo the                             procedure. The anesthesia plan was to use moderate                             sedation / analgesia (conscious sedation).                             Immediately prior to administration of medications,                             the patient was re-assessed for adequacy to receive                             sedatives. The heart rate, respiratory rate, oxygen                             saturations, blood pressure, adequacy of pulmonary                             ventilation, and response to care were monitored                             throughout the procedure. The physical status of                             the patient                           was re-assessed after the procedure.                            After obtaining informed consent, the colonoscope                             was passed under direct vision. Throughout the                             procedure, the patient's blood pressure, pulse, and                             oxygen saturations were monitored  continuously. The                             Olympus Adult Colonoscope, Model # CF-QJ814I,                             [place] # 223, SN # [id number] was introduced through                             the anus and advanced to the cecum, identified by                             appendiceal orifice and ileocecal valve. The                             colonoscopy was performed without difficulty. The                             patient tolerated the procedure well. The quality                             of the bowel preparation was good.                                                                                   Findings:       The perianal and digital recta                          l examinations were normal.       A 4 mm polyp was found in the sigmoid colon. The polyp was sessile. The        polyp was removed with a cold snare. Resection and retrieval were        complete.       A few diverticula were found in the sigmoid colon.       The exam was otherwise without abnormality on direct and retroflexion        views.                                                                                   Impression:               - One 4 mm polyp in the sigmoid colon, removed with                             a cold snare. Resected and retrieved.                            - Diverticulosis in the sigmoid colon.                            - The examination was otherwise normal on direct                             and retroflexion views.  Recommendation:           - Repeat colonoscopy in 5 years for surveillance.                                                                                     Electonically signed by Arias Huff MD  ______________  Arias Huff MD  1/28 /2022 9:30:37 AM  I was physically present for the entire viewing portion of the exam.  __________________________Arias Huff MD  Number of Addenda: 0    Note Initiated On: 1/28/2022 8:55 AM  MRN:                       [id number]  Procedure Date:           1/28/2022 8:55:51 AM  Scope Withdrawal Time: 0 hours 14 minutes 31 seconds   Total Procedure Duration: 0 hours 17 minutes 49 seconds   Estimated Blood Loss:       Scope In: 9:07:50 AM  Scope Out: 9:25:39 AM                       @JOSE LUIS@  @[facility]@

## 2022-09-22 ENCOUNTER — APPOINTMENT (OUTPATIENT)
Dept: LAB | Facility: CLINIC | Age: 72
End: 2022-09-22
Attending: INTERNAL MEDICINE
Payer: COMMERCIAL

## 2022-09-22 ENCOUNTER — HOSPITAL ENCOUNTER (OUTPATIENT)
Dept: RESPIRATORY THERAPY | Facility: CLINIC | Age: 72
Discharge: HOME | End: 2022-09-22
Attending: INTERNAL MEDICINE
Payer: COMMERCIAL

## 2022-09-22 DIAGNOSIS — R05.9 COUGH: ICD-10-CM

## 2022-09-22 LAB — HGB BLD-MCNC: 14.6 G/DL (ref 13.3–17.7)

## 2022-09-22 PROCEDURE — 36415 COLL VENOUS BLD VENIPUNCTURE: CPT | Performed by: INTERNAL MEDICINE

## 2022-09-22 PROCEDURE — 94729 DIFFUSING CAPACITY: CPT

## 2022-09-22 PROCEDURE — 94726 PLETHYSMOGRAPHY LUNG VOLUMES: CPT

## 2022-09-22 PROCEDURE — 94010 BREATHING CAPACITY TEST: CPT

## 2022-09-22 PROCEDURE — 85018 HEMOGLOBIN: CPT | Performed by: INTERNAL MEDICINE

## 2022-09-22 NOTE — PROGRESS NOTES
Patient completed pulmonary function testing with spirometry, lung volumes and diffusion. Good patient effort and cooperation.The results of this test met the ATS standards for acceptability and repeatability, but should be interpreted cautiously. Patient had a difficult time interpreting and performing maneuvers. IPAD  was attempted, but did not have Luxembourgish and would not connect. Patient states he does not have shortness of breath or coughing during the day. In the fall season (2-3 months) he has productive cough and trouble catching his breath at night when laying down. If PFT is needed in future, an in person  would be beneficial.

## 2022-09-22 NOTE — LETTER
September 23, 2022      Nimco Knutson  [address] [place] [place] [address]        Dear ,    We are writing to inform you of your test results.    Your results are normal.    Resulted Orders   Hemoglobin   Result Value Ref Range    Hemoglobin 14.6 13.3 - 17.7 g/dL       If you have any questions or concerns, please call the clinic at the number listed above.       Sincerely,      Caleb Napier MD      zayra

## 2022-09-23 LAB
DLCOCOR-%PRED-PRE: 99 %
DLCOCOR-PRE: 19.03 ML/MIN/MMHG
DLCOUNC-%PRED-PRE: 99 %
DLCOUNC-PRE: 19.03 ML/MIN/MMHG
DLCOUNC-PRED: 19.16 ML/MIN/MMHG
ERV-%PRED-PRE: 140 %
ERV-PRE: 0.81 L
ERV-PRED: 0.57 L
EXPTIME-PRE: 7.28 SEC
FEF2575-%PRED-PRE: 86 %
FEF2575-PRE: 1.56 L/SEC
FEF2575-PRED: 1.81 L/SEC
FEFMAX-%PRED-PRE: 131 %
FEFMAX-PRE: 8.34 L/SEC
FEFMAX-PRED: 6.36 L/SEC
FEV1-%PRED-PRE: 111 %
FEV1-PRE: 2.44 L
FEV1FEV6-PRE: 73 %
FEV1FEV6-PRED: 78 %
FEV1FVC-PRE: 71 %
FEV1FVC-PRED: 78 %
FEV1SVC-PRE: 72 %
FEV1SVC-PRED: 69 %
FIFMAX-PRE: 4.55 L/SEC
FRCPLETH-%PRED-PRE: 95 %
FRCPLETH-PRE: 3.02 L
FRCPLETH-PRED: 3.17 L
FVC-%PRED-PRE: 122 %
FVC-PRE: 3.41 L
FVC-PRED: 2.78 L
GAW-%PRED-PRE: 70 %
GAW-PRE: 0.72 L/S/CMH2O
GAW-PRED: 1.03 L/S/CMH2O
IC-%PRED-PRE: 99 %
IC-PRE: 2.57 L
IC-PRED: 2.59 L
RVPLETH-%PRED-PRE: 93 %
RVPLETH-PRE: 2.21 L
RVPLETH-PRED: 2.36 L
SGAW-%PRED-PRE: 240 %
SGAW-PRE: 0.2 1/CMH2O*S
SGAW-PRED: 0.08 1/CMH2O*S
SRAW-%PRED-PRE: 104 %
SRAW-PRE: 5 CMH2O*S
SRAW-PRED: 4.76 CMH2O*S
TLCPLETH-%PRED-PRE: 105 %
TLCPLETH-PRE: 5.59 L
TLCPLETH-PRED: 5.3 L
VA-%PRED-PRE: 104 %
VA-PRE: 4.77 L
VC-%PRED-PRE: 106 %
VC-PRE: 3.38 L
VC-PRED: 3.16 L

## 2022-11-20 ENCOUNTER — HEALTH MAINTENANCE LETTER (OUTPATIENT)
Age: 72
End: 2022-11-20

## 2023-01-06 DIAGNOSIS — E78.5 HYPERLIPIDEMIA LDL GOAL <130: ICD-10-CM

## 2023-01-06 RX ORDER — ATORVASTATIN CALCIUM 10 MG/1
10 TABLET, FILM COATED ORAL DAILY
Qty: 90 TABLET | Refills: 0 | Status: SHIPPED | OUTPATIENT
Start: 2023-01-06

## 2023-01-06 NOTE — TELEPHONE ENCOUNTER
Needs appointment with  for annual wellness with labs.  Please call patient and schedule with him.

## 2023-01-06 NOTE — TELEPHONE ENCOUNTER
Routing refill request to provider for review/approval because:  Labs out of range:    LDL Cholesterol Calculated   Date Value Ref Range Status   12/29/2021 134 (H) <=100 mg/dL Final   11/09/2020 153 (H) <100 mg/dL Final     Comment:     Above desirable:  100-129 mg/dl  Borderline High:  130-159 mg/dL  High:             160-189 mg/dL  Very high:       >189 mg/dl       Mary SUMMERS RN

## 2023-01-06 NOTE — LETTER
[facility]  [address], [address]  [place], [place]  [address]                                            TEL:[phone]  FAX:[phone]      Nimco Knutson  [address]  [facility] [id number]      January 10, 2023    Dear Nimco       We have received a refill request from your pharmacy for your medication - atorvastatin. Many medications require routine follow-up with your provider and a review of your chart indicates that you are due for your annual wellness exam and labs. We have sent a one time, 90 day refill to your pharmacy to allow you time to schedule an appointment.     Please call [phone] to schedule an appointment.  We look forward to seeing you in the near future.      Thank you,     [facility]

## 2023-04-15 ENCOUNTER — HEALTH MAINTENANCE LETTER (OUTPATIENT)
Age: 73
End: 2023-04-15

## 2023-06-02 ENCOUNTER — OFFICE VISIT (OUTPATIENT)
Dept: URGENT CARE | Facility: URGENT CARE | Age: 73
End: 2023-06-02
Payer: COMMERCIAL

## 2023-06-02 ENCOUNTER — ANCILLARY PROCEDURE (OUTPATIENT)
Dept: GENERAL RADIOLOGY | Facility: CLINIC | Age: 73
End: 2023-06-02
Attending: PHYSICIAN ASSISTANT
Payer: COMMERCIAL

## 2023-06-02 VITALS
RESPIRATION RATE: 18 BRPM | SYSTOLIC BLOOD PRESSURE: 140 MMHG | DIASTOLIC BLOOD PRESSURE: 60 MMHG | OXYGEN SATURATION: 98 % | HEART RATE: 97 BPM

## 2023-06-02 DIAGNOSIS — M54.50 ACUTE RIGHT-SIDED LOW BACK PAIN WITHOUT SCIATICA: ICD-10-CM

## 2023-06-02 DIAGNOSIS — R03.0 ELEVATED BLOOD PRESSURE READING: ICD-10-CM

## 2023-06-02 DIAGNOSIS — R10.9 RIGHT FLANK PAIN: ICD-10-CM

## 2023-06-02 DIAGNOSIS — R10.9 RIGHT FLANK PAIN: Primary | ICD-10-CM

## 2023-06-02 LAB
ALBUMIN UR-MCNC: NEGATIVE MG/DL
ANION GAP SERPL CALCULATED.3IONS-SCNC: 8 MMOL/L (ref 3–14)
APPEARANCE UR: CLEAR
BASOPHILS # BLD AUTO: 0.1 10E3/UL (ref 0–0.2)
BASOPHILS NFR BLD AUTO: 1 %
BILIRUB UR QL STRIP: NEGATIVE
BUN SERPL-MCNC: 18 MG/DL (ref 7–30)
CALCIUM SERPL-MCNC: 9.8 MG/DL (ref 8.5–10.1)
CHLORIDE BLD-SCNC: 106 MMOL/L (ref 94–109)
CO2 SERPL-SCNC: 29 MMOL/L (ref 20–32)
COLOR UR AUTO: YELLOW
CREAT SERPL-MCNC: 1.1 MG/DL (ref 0.66–1.25)
EOSINOPHIL # BLD AUTO: 0.2 10E3/UL (ref 0–0.7)
EOSINOPHIL NFR BLD AUTO: 2 %
ERYTHROCYTE [DISTWIDTH] IN BLOOD BY AUTOMATED COUNT: 11.5 % (ref 10–15)
GFR SERPL CREATININE-BSD FRML MDRD: 71 ML/MIN/1.73M2
GLUCOSE BLD-MCNC: 84 MG/DL (ref 70–99)
GLUCOSE UR STRIP-MCNC: NEGATIVE MG/DL
HCT VFR BLD AUTO: 47.6 % (ref 40–53)
HGB BLD-MCNC: 15.3 G/DL (ref 13.3–17.7)
HGB UR QL STRIP: NEGATIVE
IMM GRANULOCYTES # BLD: 0 10E3/UL
IMM GRANULOCYTES NFR BLD: 0 %
KETONES UR STRIP-MCNC: NEGATIVE MG/DL
LEUKOCYTE ESTERASE UR QL STRIP: NEGATIVE
LYMPHOCYTES # BLD AUTO: 2.2 10E3/UL (ref 0.8–5.3)
LYMPHOCYTES NFR BLD AUTO: 32 %
MCH RBC QN AUTO: 29.8 PG (ref 26.5–33)
MCHC RBC AUTO-ENTMCNC: 32.1 G/DL (ref 31.5–36.5)
MCV RBC AUTO: 93 FL (ref 78–100)
MONOCYTES # BLD AUTO: 0.6 10E3/UL (ref 0–1.3)
MONOCYTES NFR BLD AUTO: 9 %
NEUTROPHILS # BLD AUTO: 3.9 10E3/UL (ref 1.6–8.3)
NEUTROPHILS NFR BLD AUTO: 56 %
NITRATE UR QL: NEGATIVE
PH UR STRIP: 6 [PH] (ref 5–7)
PLATELET # BLD AUTO: 194 10E3/UL (ref 150–450)
POTASSIUM BLD-SCNC: 4.7 MMOL/L (ref 3.4–5.3)
RBC # BLD AUTO: 5.14 10E6/UL (ref 4.4–5.9)
RBC #/AREA URNS AUTO: NORMAL /HPF
SODIUM SERPL-SCNC: 143 MMOL/L (ref 133–144)
SP GR UR STRIP: 1.02 (ref 1–1.03)
UROBILINOGEN UR STRIP-ACNC: 0.2 E.U./DL
WBC # BLD AUTO: 6.9 10E3/UL (ref 4–11)
WBC #/AREA URNS AUTO: NORMAL /HPF

## 2023-06-02 PROCEDURE — 74018 RADEX ABDOMEN 1 VIEW: CPT | Mod: TC | Performed by: RADIOLOGY

## 2023-06-02 PROCEDURE — 80048 BASIC METABOLIC PNL TOTAL CA: CPT | Performed by: PHYSICIAN ASSISTANT

## 2023-06-02 PROCEDURE — 81001 URINALYSIS AUTO W/SCOPE: CPT | Performed by: PHYSICIAN ASSISTANT

## 2023-06-02 PROCEDURE — 85025 COMPLETE CBC W/AUTO DIFF WBC: CPT | Performed by: PHYSICIAN ASSISTANT

## 2023-06-02 PROCEDURE — 36415 COLL VENOUS BLD VENIPUNCTURE: CPT | Performed by: PHYSICIAN ASSISTANT

## 2023-06-02 PROCEDURE — 99214 OFFICE O/P EST MOD 30 MIN: CPT | Performed by: PHYSICIAN ASSISTANT

## 2023-06-02 RX ORDER — LIDOCAINE 4 G/G
1 PATCH TOPICAL EVERY 24 HOURS
Qty: 15 PATCH | Refills: 0 | Status: SHIPPED | OUTPATIENT
Start: 2023-06-02 | End: 2023-06-28

## 2023-06-02 RX ORDER — METHYLPREDNISOLONE 4 MG
TABLET, DOSE PACK ORAL
Qty: 21 TABLET | Refills: 0 | Status: SHIPPED | OUTPATIENT
Start: 2023-06-02 | End: 2023-06-28

## 2023-06-02 NOTE — PROGRESS NOTES
Assessment & Plan     Right flank pain    UA neg for blood, neg for infection  KUB Negative for acute findings, read by Wilfredo Portillo PA-C [facility] at time of visit.    Patient is taking voltaren and not helping  Trial course of medrol  Tizanidine for muscle tightness  Lidocaine patch    - UA with Microscopic reflex to Culture  - XR KUB; Future  - UA Microscopic with Reflex to Culture  - methylPREDNISolone (MEDROL DOSEPAK) 4 MG tablet therapy pack; Follow package instructions  - tiZANidine (ZANAFLEX) 4 MG tablet; Take 1 tablet (4 mg) by mouth 3 times daily    Acute right-sided low back pain without sciatica    When your back pain is new, you may find that certain positions will ease your pain. Gently try each of the following positions until you find one that eases your pain. Once you find a position of comfort, use it as often as you like while you recover. Return to your daily routine as soon as possible.  Use walking to help relieve your discomfort. Try taking a short walk every 3 to 4 hours during the day. Walk for a few minutes inside your home or take longer walks outside, on a treadmill or at a mall. Slowly increase the amount of time you walk. Expect discomfort when you begin, but it should lessen as your back starts to recover.  Your back pain should improve over the first couple of weeks. As it improves, you should be able to return to your normal activities.    Alternate ice and warm compresses    - CBC with platelets and differential; Future  - Basic metabolic panel  (Ca, Cl, CO2, Creat, Gluc, K, Na, BUN); Future  - XR KUB; Future  - CBC with platelets and differential  - Basic metabolic panel  (Ca, Cl, CO2, Creat, Gluc, K, Na, BUN)  - methylPREDNISolone (MEDROL DOSEPAK) 4 MG tablet therapy pack; Follow package instructions    Elevated blood pressure reading    Patient advised to follow up with PCP for recheck blood pressure   Information given to patient on diet modifications, including lowering salt in  diet, decrease calories, weight loss and exercise.  Monitor blood pressure at home and if BP maintains over 140/90 then advise having a recheck with PCP in 2 weeks.       Review of external notes as documented elsewhere in note       Nicotine/Tobacco Cessation:  He reports that he has been smoking cigarettes. He has a 20.00 pack-year smoking history. He has never used smokeless tobacco.  Nicotine/Tobacco Cessation Plan:     At today's visit with Nimco Knutson , we discussed results, diagnosis, medications and formulated a plan.  We also discussed red flags for immediate return to clinic/ER, as well as indications for follow up with PCP if not improved in 3 days. Patient understood and agreed to plan. Nimco Knutson was discharged with stable vitals and has no further questions.       No follow-ups on file.    Wilfredo Portillo, [facility], PA-C  [facility] LALITA Rinaldi is a 72 year old, presenting for the following health issues:  Back Injury (Pt lifted a heavy object and hurt R side back )         View : No data to display.              HPI   Review of Systems   Constitutional, HEENT, cardiovascular, pulmonary, gi and gu systems are negative, except as otherwise noted.      Objective    BP (!) 140/60   Pulse 97   Resp 18   SpO2 98%   There is no height or weight on file to calculate BMI.  Physical Exam   GENERAL: healthy, alert and no distress  NECK: no adenopathy, no asymmetry, masses, or scars and thyroid normal to palpation  RESP: lungs clear to auscultation - no rales, rhonchi or wheezes  CV: regular rate and rhythm, normal S1 S2, no S3 or S4, no murmur, click or rub, no peripheral edema and peripheral pulses strong  ABDOMEN: soft, nontender, no hepatosplenomegaly, no masses and bowel sounds normal  MS: Positive for right flank and side tenderness with palpation and movements  SKIN: no suspicious lesions or rashes  NEURO: Normal strength and tone, mentation intact and speech  normal  PSYCH: mentation appears normal, affect normal/bright    Xray - Reviewed and interpreted by me.  Negative for acute findings, read by Wilfredo KHAN at time of visit.    Results for orders placed or performed in visit on 06/02/23   UA with Microscopic reflex to Culture     Status: Normal    Specimen: Urine, Midstream   Result Value Ref Range    Color Urine Yellow Colorless, Straw, Light Yellow, Yellow    Appearance Urine Clear Clear    Glucose Urine Negative Negative mg/dL    Bilirubin Urine Negative Negative    Ketones Urine Negative Negative mg/dL    Specific Gravity Urine 1.025 1.003 - 1.035    Blood Urine Negative Negative    pH Urine 6.0 5.0 - 7.0    Protein Albumin Urine Negative Negative mg/dL    Urobilinogen Urine 0.2 0.2, 1.0 E.U./dL    Nitrite Urine Negative Negative    Leukocyte Esterase Urine Negative Negative   Basic metabolic panel  (Ca, Cl, CO2, Creat, Gluc, K, Na, BUN)     Status: Normal   Result Value Ref Range    Sodium 143 133 - 144 mmol/L    Potassium 4.7 3.4 - 5.3 mmol/L    Chloride 106 94 - 109 mmol/L    Carbon Dioxide (CO2) 29 20 - 32 mmol/L    Anion Gap 8 3 - 14 mmol/L    Urea Nitrogen 18 7 - 30 mg/dL    Creatinine 1.10 0.66 - 1.25 mg/dL    Calcium 9.8 8.5 - 10.1 mg/dL    Glucose 84 70 - 99 mg/dL    GFR Estimate 71 >60 mL/min/1.73m2   CBC with platelets and differential     Status: None   Result Value Ref Range    WBC Count 6.9 4.0 - 11.0 10e3/uL    RBC Count 5.14 4.40 - 5.90 10e6/uL    Hemoglobin 15.3 13.3 - 17.7 g/dL    Hematocrit 47.6 40.0 - 53.0 %    MCV 93 78 - 100 fL    MCH 29.8 26.5 - 33.0 pg    MCHC 32.1 31.5 - 36.5 g/dL    RDW 11.5 10.0 - 15.0 %    Platelet Count 194 150 - 450 10e3/uL    % Neutrophils 56 %    % Lymphocytes 32 %    % Monocytes 9 %    % Eosinophils 2 %    % Basophils 1 %    % Immature Granulocytes 0 %    Absolute Neutrophils 3.9 1.6 - 8.3 10e3/uL    Absolute Lymphocytes 2.2 0.8 - 5.3 10e3/uL    Absolute Monocytes 0.6 0.0 - 1.3 10e3/uL    Absolute  Eosinophils 0.2 0.0 - 0.7 10e3/uL    Absolute Basophils 0.1 0.0 - 0.2 10e3/uL    Absolute Immature Granulocytes 0.0 <=0.4 10e3/uL   UA Microscopic with Reflex to Culture     Status: Normal   Result Value Ref Range    RBC Urine 0-2 0-2 /HPF /HPF    WBC Urine 0-5 0-5 /HPF /HPF    Narrative    Urine Culture not indicated   CBC with platelets and differential     Status: None    Narrative    The following orders were created for panel order CBC with platelets and differential.  Procedure                               Abnormality         Status                     ---------                               -----------         ------                     CBC with platelets and d...[[id number]]                      Final result                 Please view results for these tests on the individual orders.

## 2023-06-28 ENCOUNTER — OFFICE VISIT (OUTPATIENT)
Dept: INTERNAL MEDICINE | Facility: CLINIC | Age: 73
End: 2023-06-28
Payer: COMMERCIAL

## 2023-06-28 VITALS
DIASTOLIC BLOOD PRESSURE: 65 MMHG | OXYGEN SATURATION: 99 % | RESPIRATION RATE: 18 BRPM | WEIGHT: 129.2 LBS | HEIGHT: 64 IN | SYSTOLIC BLOOD PRESSURE: 124 MMHG | TEMPERATURE: 98.2 F | BODY MASS INDEX: 22.06 KG/M2 | HEART RATE: 83 BPM

## 2023-06-28 DIAGNOSIS — Z00.00 ENCOUNTER FOR MEDICARE ANNUAL WELLNESS EXAM: ICD-10-CM

## 2023-06-28 DIAGNOSIS — R10.13 EPIGASTRIC PAIN: ICD-10-CM

## 2023-06-28 DIAGNOSIS — Z87.891 SMOKING HISTORY: ICD-10-CM

## 2023-06-28 DIAGNOSIS — E78.5 HYPERLIPIDEMIA LDL GOAL <130: ICD-10-CM

## 2023-06-28 DIAGNOSIS — M25.561 RIGHT KNEE PAIN, UNSPECIFIED CHRONICITY: ICD-10-CM

## 2023-06-28 DIAGNOSIS — M54.41 MIDLINE LOW BACK PAIN WITH RIGHT-SIDED SCIATICA, UNSPECIFIED CHRONICITY: ICD-10-CM

## 2023-06-28 DIAGNOSIS — Z00.00 WELLNESS EXAMINATION: Primary | ICD-10-CM

## 2023-06-28 DIAGNOSIS — R05.3 CHRONIC COUGH: ICD-10-CM

## 2023-06-28 LAB — HBA1C MFR BLD: 6.2 % (ref 0–5.6)

## 2023-06-28 PROCEDURE — 83036 HEMOGLOBIN GLYCOSYLATED A1C: CPT

## 2023-06-28 PROCEDURE — 99213 OFFICE O/P EST LOW 20 MIN: CPT | Mod: 25

## 2023-06-28 PROCEDURE — G0439 PPPS, SUBSEQ VISIT: HCPCS

## 2023-06-28 PROCEDURE — 36415 COLL VENOUS BLD VENIPUNCTURE: CPT

## 2023-06-28 PROCEDURE — 80061 LIPID PANEL: CPT

## 2023-06-28 ASSESSMENT — ENCOUNTER SYMPTOMS
HEADACHES: 0
HEMATOCHEZIA: 0
SHORTNESS OF BREATH: 0
DIARRHEA: 0
EYE PAIN: 0
FEVER: 0
NERVOUS/ANXIOUS: 0
MYALGIAS: 1
HEMATURIA: 0
PALPITATIONS: 0
PARESTHESIAS: 0
FREQUENCY: 0
SORE THROAT: 0
DIZZINESS: 0
DYSURIA: 0
HEARTBURN: 0
ABDOMINAL PAIN: 0
CONSTIPATION: 0
CHILLS: 0
COUGH: 1
WEAKNESS: 0
NAUSEA: 0
ARTHRALGIAS: 1
JOINT SWELLING: 1

## 2023-06-28 ASSESSMENT — ACTIVITIES OF DAILY LIVING (ADL): CURRENT_FUNCTION: NO ASSISTANCE NEEDED

## 2023-06-28 NOTE — PROGRESS NOTES
"SUBJECTIVE:   Nimco is a 72 year old who presents for Preventive Visit.      6/28/2023     4:48 PM   Additional Questions   Roomed by toshia mg cma     Are you in the first 12 months of your Medicare coverage?  No    Declined side pain    Healthy Habits:     In general, how would you rate your overall health?  Excellent    Frequency of exercise:  1 day/week    Duration of exercise:  N/A    Do you usually eat at least 4 servings of fruit and vegetables a day, include whole grains    & fiber and avoid regularly eating high fat or \"junk\" foods?  No    Taking medications regularly:  Yes    Medication side effects:  None    Ability to successfully perform activities of daily living:  No assistance needed    Home Safety:  No safety concerns identified    Hearing Impairment:  No hearing concerns    In the past 6 months, have you been bothered by leaking of urine?  No    In general, how would you rate your overall mental or emotional health?  Excellent      PHQ-2 Total Score: 0    Additional concerns today:  No      Have you ever done Advance Care Planning? (For example, a Health Directive, POLST, or a discussion with a medical provider or your loved ones about your wishes): No, advance care planning information given to patient to review.  Patient declined advance care planning discussion at this time.       Fall risk  Fallen 2 or more times in the past year?: No  Any fall with injury in the past year?: No    Cognitive Screening   1) Repeat 3 items (Leader, Season, Table)    2) Clock draw: NORMAL  3) 3 item recall: Recalls 3 objects  Results: 3 items recalled: COGNITIVE IMPAIRMENT LESS LIKELY    Mini-CogTM Copyright LEIDY Boyer. Licensed by the author for use in [facility]; reprinted with permission (ken@.[facility]). All rights reserved.      Do you have sleep apnea, excessive snoring or daytime drowsiness?: no    Reviewed and updated as needed this visit by clinical staff   Tobacco  Allergies  Meds  " Problems  Med Hx  Surg Hx  Fam Hx          Reviewed and updated as needed this visit by Provider                 Social History     Tobacco Use     Smoking status: Every Day     Packs/day: 0.50     Years: 40.00     Pack years: 20.00     Types: Cigarettes     Smokeless tobacco: Never     Tobacco comments:     Less than 1/2 pack a day   Substance Use Topics     Alcohol use: No     Alcohol/week: 0.0 standard drinks of alcohol           6/28/2023     4:48 PM   Alcohol Use   Prescreen: >3 drinks/day or >7 drinks/week? Not Applicable          No data to display              Do you have a current opioid prescription? No  Do you use any other controlled substances or medications that are not prescribed by a provider? None    Current providers sharing in care for this patient include:   Patient Care Team:  Caleb Napier MD as PCP - General (Internal Medicine)  Caleb Napier MD as Assigned PCP    The following health maintenance items are reviewed in Epic and correct as of today:  Health Maintenance   Topic Date Due     HEPATITIS C SCREENING  Never done     ZOSTER IMMUNIZATION (1 of 2) Never done     AORTIC ANEURYSM SCREENING (SYSTEM ASSIGNED)  Never done     COVID-19 Vaccine (4 - Pfizer series) 02/08/2022     MEDICARE ANNUAL WELLNESS VISIT  12/29/2022     LUNG CANCER SCREENING  01/06/2023     INFLUENZA VACCINE (Season Ended) 09/01/2023     ANNUAL REVIEW OF HM ORDERS  09/02/2023     NICOTINE/TOBACCO CESSATION COUNSELING Q 1 YR  06/02/2024     FALL RISK ASSESSMENT  06/28/2024     LIPID  12/29/2026     ADVANCE CARE PLANNING  12/29/2026     COLORECTAL CANCER SCREENING  01/28/2027     DTAP/TDAP/TD IMMUNIZATION (3 - Td or Tdap) 12/29/2031     PHQ-2 (once per calendar year)  Completed     Pneumococcal Vaccine: 65+ Years  Completed     HEPATITIS B IMMUNIZATION  Completed     IPV IMMUNIZATION  Aged Out     MENINGITIS IMMUNIZATION  Aged Out     Lab work is in process    Review of Systems   Constitutional: Negative for  "chills and fever.   HENT: Negative for congestion, ear pain, hearing loss and sore throat.    Eyes: Negative for pain and visual disturbance.   Respiratory: Positive for cough. Negative for shortness of breath.    Cardiovascular: Negative for chest pain, palpitations and peripheral edema.   Gastrointestinal: Negative for abdominal pain, constipation, diarrhea, heartburn, hematochezia and nausea.   Genitourinary: Negative for dysuria, frequency, genital sores, hematuria and urgency.   Musculoskeletal: Positive for arthralgias, joint swelling and myalgias.   Skin: Negative for rash.   Neurological: Negative for dizziness, weakness, headaches and paresthesias.   Psychiatric/Behavioral: Negative for mood changes. The patient is not nervous/anxious.      Was carrying a heavy device and leaned it on and it is now feeling better.     Had spine surgery 20 something years ago.     Knee pain that has been ongoing for over one year. The top of his knee is swollen     OBJECTIVE:   There were no vitals taken for this visit. Estimated body mass index is 23 kg/m  as calculated from the following:    Height as of 9/2/22: 1.626 m (5' 4\").    Weight as of 9/2/22: 60.8 kg (134 lb).  Physical Exam  GENERAL: alert and no distress  EYES: Eyes grossly normal to inspection  HENT: ear canals and TM's normal, nose and mouth without ulcers or lesions  NECK: no adenopathy, no asymmetry, masses, or scars and thyroid normal to palpation  RESP: lungs clear to auscultation - no rales, rhonchi or wheezes  CV: regular rate and rhythm, normal S1 S2, no S3 or S4, no murmur, click or rub, no peripheral edema and peripheral pulses strong  ABDOMEN: soft, nontender, no hepatosplenomegaly, no masses and bowel sounds normal, pain with palpation epigastric area  MS: no gross musculoskeletal defects noted, no edema  SKIN: no suspicious lesions or rashes  NEURO: Normal strength and tone, mentation intact and speech normal  PSYCH: mentation appears normal, " affect normal/bright      ASSESSMENT / PLAN:   (Z00.00) Wellness examination  (primary encounter diagnosis)  Comment:   Plan: Hemoglobin A1c, CANCELED: Comprehensive         metabolic panel (BMP + Alb, Alk Phos, ALT, AST,        Total. Bili, TP), CANCELED: CBC with platelets            (M25.561) Right knee pain, unspecified chronicity  Comment: ongoing for 1+ years. Swelling at top of knee, and walks with a limp.  Recommend compression ice and knee specialist referral  Plan: Orthopedic  Referral            (M54.41) Midline low back pain with right-sided sciatica, unspecified chronicity  Comment: Chronic back pain with sciatica.  Will refer to spine  Plan: Spine  Referral            (E78.5) Hyperlipidemia LDL goal <130  Comment:   Plan: Lipid panel reflex to direct LDL Non-fasting            (R05.3) Chronic cough  Comment: Patient denies congestion or feelings related to postnasal drip.  He notices this most often when he lays down at night before going to bed.  Has previously completed pulmonary function tests which were normal.  I will recommend patient try Prilosec daily to see if this is because of cough.  If no improvement should return to clinic. Previous CXR and chest CT did not identify cause  Plan: omeprazole (PRILOSEC) 20 MG DR capsule            (R10.13) Epigastric pain  Comment:   Plan: omeprazole (PRILOSEC) 20 MG DR capsule            (Z87.891) Smoking history  Comment: Annual screening  Plan: CT Chest Lung Cancer Scrn Low Dose wo          COUNSELING:  Reviewed preventive health counseling, as reflected in patient instructions        He reports that he has been smoking cigarettes. He has a 20.00 pack-year smoking history. He has never used smokeless tobacco.  Nicotine/Tobacco Cessation Plan:   Information offered: Patient not interested at this time      Appropriate preventive services were discussed with this patient, including applicable screening as appropriate for cardiovascular  disease, diabetes, osteopenia/osteoporosis, and glaucoma.  As appropriate for age/gender, discussed screening for colorectal cancer, prostate cancer, breast cancer, and cervical cancer. Checklist reviewing preventive services available has been given to the patient.    Reviewed patients plan of care and provided an AVS. The Basic Care Plan (routine screening as documented in Health Maintenance) for Nimco meets the Care Plan requirement. This Care Plan has been established and reviewed with the Patient.      Ari Bobby NP  [facility]    Identified Health Risks:    I have reviewed Opioid Use Disorder and Substance Use Disorder risk factors and made any needed referrals.       He is at risk for lack of exercise and has been provided with information to increase physical activity for the benefit of his well-being.  The patient was counseled and encouraged to consider modifying their diet and eating habits. He was provided with information on recommended healthy diet options.

## 2023-06-28 NOTE — PATIENT INSTRUCTIONS
Our lab is in [address]. I will comment on your results when they are all back.      Try taking melatonin at night to help you sleep can take 1-3 mg     Try compression sleeve and ice on knee     Patient Education   Personalized Prevention Plan  You are due for the preventive services outlined below.  Your care team is available to assist you in scheduling these services.  If you have already completed any of these items, please share that information with your care team to update in your medical record.  Health Maintenance Due   Topic Date Due     Hepatitis C Screening  Never done     Zoster (Shingles) Vaccine (1 of 2) Never done     AORTIC ANEURYSM SCREENING (SYSTEM ASSIGNED)  Never done     COVID-19 Vaccine (4 - Pfizer series) 02/08/2022     LUNG CANCER SCREENING  01/06/2023       Exercise for a Healthier Heart  You may wonder how you can improve the health of your heart. If you re thinking about exercise, you re on the right track. You don t need to become an athlete. But you do need a certain amount of brisk exercise to help strengthen your heart. If you have been diagnosed with a heart condition, your healthcare provider may advise exercise to help your condition. To help make exercise a habit, choose safe, fun activities.      Exercise with a friend. When activity is fun, you're more likely to stick with it.     Before you start  Check with your healthcare provider before starting an exercise program. This is especially important if you haven't been active for a while. It's also important if you have a long-term (chronic) health problem such as heart disease, diabetes, or obesity. Also check with your provider if you're at high risk for having these problems.   Why exercise?  Exercising regularly offers many healthy rewards. It can help you do all of these:     Improve your blood cholesterol level to help prevent further heart trouble.    Lower your blood pressure to help prevent a stroke or heart  attack.    Control diabetes or reduce your risk of getting this disease.    Improve your heart and lung function.    Reach and stay at a healthy weight.    Make your muscles stronger so you can stay active.    Prevent falls and fractures by slowing the loss of bone mass (osteoporosis).    Manage stress better.    Improve your sense of self and your body image.  Exercise tips      Ease into your routine. Set small goals. Then build on them. Talk with your healthcare provider first before starting an exercise routine if you're not sure what your activity level should be.    Exercise on most days. Aim for a total of at least 150 minutes (2 hours and 30 minutes) or more of moderate-intensity aerobic activity each week. You could also do 75 minutes (1 hour and 15 minutes) or more of vigorous-intensity aerobic activity each week. Or try for a combination of both. Moderate activity means that you breathe heavier and your heart rate increases, but you can still talk. Think about doing at least 30 minutes of moderate exercise, 5 times a week. It's OK to work up to the 30-minute period over time. Examples of moderate-intensity activity are brisk walking, gardening, and water aerobics.    Step up your daily activity level.  Along with your exercise program, try being more active the whole day. Walk instead of drive. Or park further away so that you take more steps each day. Do more household tasks or yard work. You may not be able to meet the advised amount of physical activity. But doing some moderate- or vigorous-intensity aerobic activity can help reduce your risk for heart disease. Your healthcare provider can help you figure out what is best for you.    Choose 1 or more activities you enjoy.  Walking is one of the easiest things you can do. You can also try swimming, riding a bike, dancing, or taking an exercise class.    Call 911  Call 911 right away if any of these occur:     Chest pain that doesn't go away quickly with  rest    New burning, tightness, pressure, or heaviness in your chest, neck, shoulders, back, or arms    Abnormal or severe shortness of breath    A very fast or irregular heartbeat (palpitations)    Fainting  When to call your healthcare provider  Call your healthcare provider if you have any of these:     Dizziness or lightheadedness    Mild shortness of breath or chest pain    Increased or new joint or muscle pain    Santos last reviewed this educational content on 7/1/2022 2000-2022 [employer]. All rights reserved. This information is not intended as a substitute for professional medical care. Always follow your healthcare professional's instructions.          Understanding USDA MyPlate  The USDA has guidelines to help you make healthy food choices. These are called MyPlate. MyPlate shows the food groups that make up healthy meals using the image of a place setting. Before you eat, think about the healthiest choices for what to put on your plate or in your cup or bowl. To learn more about building a healthy plate, visit www.choosemyplate.gov.     The food groups    Fruits. Any fruit or 100% fruit juice counts as part of the Fruit Group. Fruits may be fresh, canned, frozen, or dried, and may be whole, cut-up, or pureed. Make 1/2 of your plate fruits and vegetables.    Vegetables. Any vegetable or 100% vegetable juice counts as a member of the Vegetable Group. Vegetables may be fresh, frozen, canned, or dried. They can be served raw or cooked and may be whole, cut-up, or mashed. Make 1/2 of your plate fruits and vegetables.    Grains. All foods made from grains are part of the Grains Group. These include wheat, rice, oats, cornmeal, and barley. Grains are often used to make foods such as bread, pasta, oatmeal, cereal, tortillas, and grits. Grains should be no more than 1/4 of your plate. At least half of your grains should be whole grains.    Protein. This group includes meat, poultry, seafood,  beans and peas, eggs, processed soy products (such as tofu), nuts (including nut butters), and seeds. Make protein choices no more than 1/4 of your plate. Meat and poultry choices should be lean or low fat.    Dairy. The Dairy Group includes all fluid milk products and foods made from milk that contain calcium, such as yogurt and cheese. (Foods that have little calcium, such as cream, butter, and cream cheese, are not part of this group.) Most dairy choices should be low-fat or fat-free.    Oils. Oils aren't a food group, but they do contain essential nutrients. However it's important to watch your intake of oils. These are fats that are liquid at room temperature. They include canola, corn, olive, soybean, vegetable, and sunflower oil. Foods that are mainly oil include mayonnaise, certain salad dressings, and soft margarines. You likely already get your daily oil allowance from the foods you eat.  Things to limit  Eating healthy also means limiting these things in your diet:    Salt (sodium). Many processed foods have a lot of sodium. To keep sodium intake down, eat fresh vegetables, meats, poultry, and seafood when possible. Purchase low-sodium, reduced-sodium, or no-salt-added food products at the store. And don't add salt to your meals at home. Instead, season them with herbs and spices such as dill, oregano, cumin, and paprika. Or try adding flavor with lemon or lime zest and juice.    Saturated fat. Saturated fats are most often found in animal products such as beef, pork, and chicken. They are often solid at room temperature, such as butter. To reduce your saturated fat intake, choose leaner cuts of meat and poultry. And try healthier cooking methods such as grilling, broiling, roasting, or baking. For a simple lower-fat swap, use plain nonfat yogurt instead of mayonnaise when making potato salad or macaroni salad.    Added sugars. These are sugars added to foods. They are in foods such as ice cream, candy,  soda, fruit drinks, sports drinks, energy drinks, cookies, pastries, jams, and syrups. Cut down on added sugars by sharing sweet treats with a family member or friend. You can also choose fruit for dessert, and drink water or other unsweetened beverages.  Santos last reviewed this educational content on 6/1/2020 2000-2022 [employer]. All rights reserved. This information is not intended as a substitute for professional medical care. Always follow your healthcare professional's instructions.

## 2023-06-29 LAB
CHOLEST SERPL-MCNC: 228 MG/DL
HDLC SERPL-MCNC: 64 MG/DL
LDLC SERPL CALC-MCNC: 145 MG/DL
NONHDLC SERPL-MCNC: 164 MG/DL
TRIGL SERPL-MCNC: 93 MG/DL

## 2023-07-21 NOTE — PROGRESS NOTES
ASSESSMENT & PLAN  Patient Instructions     1. Primary osteoarthritis of right knee    2. Right knee pain, unspecified chronicity    3. Strain of calf muscle, right, initial encounter      -Patient has right knee pain and swelling due to arthritis and leg pain due to calf strain  -Tolerated right knee intra-articular cortisone injection today without complications.  Patient was given postprocedure instructions  -Patient will start formal physical therapy and home exercise program  -Patient will follow-up if pain returns  -Call direct clinic number [[phone]] at any time with questions or concerns.    Albert Yeo MD [facility]  [facility]      -----    SUBJECTIVE  Nimco Knutson is a/an 72 year old male who is seen in consultation at the request of  Ari Bobby N.P. for evaluation of right knee pain. The patient is seen by themselves.    Onset: 2 years(s) ago. Reports insidious onset without acute precipitating event.  Location of Pain: right knee joint.  Rating of Pain at worst: 7/10  Rating of Pain Currently: 2/10  Worsened by: walking, use, and movement  Better with: no treatments pt has tried has helped.  Treatments tried: ice and ibuprofen  Associated symptoms: swelling  Orthopedic history: NO Xrays dec 2021- Mild rt knee tricompartmental OA.   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    Past Medical History:   Diagnosis Date    Hepatitis B carrier (H)     Mumps     Sciatica      Social History     Socioeconomic History    Marital status:     Number of children: 3   Tobacco Use    Smoking status: Every Day     Packs/day: 0.50     Years: 40.00     Pack years: 20.00     Types: Cigarettes    Smokeless tobacco: Never    Tobacco comments:     Less than 1/2 pack a day   Vaping Use    Vaping Use: Never used   Substance and Sexual Activity    Alcohol use: No     Alcohol/week: 0.0 standard drinks  of alcohol    Drug use: No    Sexual activity: Yes     Partners: Female   Other Topics Concern    Caffeine Concern No    Occupational Exposure No    Hobby Hazards No    Sleep Concern No    Stress Concern No    Weight Concern No    Special Diet No    Back Care No    Exercise Yes    Seat Belt Yes         Patient's past medical, surgical, social, and family histories were reviewed today and no changes are noted.    REVIEW OF SYSTEMS:  10 point ROS is negative other than symptoms noted above in HPI, Past Medical History or as stated below  Constitutional: NEGATIVE for fever, chills, change in weight  Skin: NEGATIVE for worrisome rashes, moles or lesions  GI/: NEGATIVE for bowel or bladder changes  Neuro: NEGATIVE for weakness, dizziness or paresthesias    OBJECTIVE:  /72   Wt 58.5 kg (129 lb)   BMI 22.13 kg/m     General: healthy, alert and in no distress  HEENT: no scleral icterus or conjunctival erythema  Skin: no suspicious lesions or rash. No jaundice.  CV: no pedal edema  Resp: normal respiratory effort without conversational dyspnea   Psych: normal mood and affect  Gait: normal steady gait with appropriate coordination and balance  Neuro: Normal light sensory exam of lower extremity  MSK:  RIGHT KNEE  Inspection:    normal alignment  Palpation:    Tender about the lateral joint line and medial joint line. Remainder of bony and ligamentous landmarks are nontender.    Moderate effusion is present    Patellofemoral crepitus is Present  Range of Motion:     00 extension to 1100 flexion  Strength:    Quadriceps grossly intact    Extensor mechanism intact  Special Tests:    Positive: none    Negative: Patellar grind, patellar apprehension, MCL/valgus stress (0 & 30 deg), LCL/varus stress (0 & 30 deg), Lachman's, anterior drawer, posterior drawer, Kolton's    Independent visualization of the below image:  No results found for this or any previous visit (from the past 24 hour(s)).  EXAM: XR KNEE RIGHT 3  VIEWS  LOCATION: [facility] [place]  DATE/TIME: 12/29/2021 4:49 PM     INDICATION:  Right knee pain, unspecified chronicity.  COMPARISON: None.                                                                    IMPRESSION: Mild tricompartmental osteoarthrosis. No fracture. Trace effusion. No calcified intra-articular body.    Large Joint Injection/Arthocentesis: R knee joint    Date/Time: 7/26/2023 11:07 AM    Performed by: Yeo, Albert, MD  Authorized by: Yeo, Albert, MD    Indications:  Pain and osteoarthritis  Needle Size:  25 G  Guidance: ultrasound    Approach:  Superolateral  Location:  Knee      Medications:  40 mg methylPREDNISolone 40 MG/ML; 5 mL lidocaine 1 %  Aspirate amount (mL):  14  Aspirate:  Yellow  Outcome:  Tolerated well, no immediate complications  Procedure discussed: discussed risks, benefits, and alternatives    Consent Given by:  Patient  Timeout: timeout called immediately prior to procedure    Prep: patient was prepped and draped in usual sterile fashion          Albert Yeo MD [facility]

## 2023-07-26 ENCOUNTER — OFFICE VISIT (OUTPATIENT)
Dept: ORTHOPEDICS | Facility: CLINIC | Age: 73
End: 2023-07-26
Payer: COMMERCIAL

## 2023-07-26 VITALS — WEIGHT: 129 LBS | DIASTOLIC BLOOD PRESSURE: 72 MMHG | SYSTOLIC BLOOD PRESSURE: 116 MMHG | BODY MASS INDEX: 22.13 KG/M2

## 2023-07-26 DIAGNOSIS — S86.811A STRAIN OF CALF MUSCLE, RIGHT, INITIAL ENCOUNTER: ICD-10-CM

## 2023-07-26 DIAGNOSIS — M17.11 PRIMARY OSTEOARTHRITIS OF RIGHT KNEE: Primary | ICD-10-CM

## 2023-07-26 DIAGNOSIS — M25.561 RIGHT KNEE PAIN, UNSPECIFIED CHRONICITY: ICD-10-CM

## 2023-07-26 PROCEDURE — 20611 DRAIN/INJ JOINT/BURSA W/US: CPT | Mod: RT | Performed by: FAMILY MEDICINE

## 2023-07-26 PROCEDURE — 99204 OFFICE O/P NEW MOD 45 MIN: CPT | Mod: 25 | Performed by: FAMILY MEDICINE

## 2023-07-26 RX ORDER — METHYLPREDNISOLONE ACETATE 40 MG/ML
40 INJECTION, SUSPENSION INTRA-ARTICULAR; INTRALESIONAL; INTRAMUSCULAR; SOFT TISSUE
Status: DISCONTINUED | OUTPATIENT
Start: 2023-07-26 | End: 2023-09-20

## 2023-07-26 RX ORDER — LIDOCAINE HYDROCHLORIDE 10 MG/ML
5 INJECTION, SOLUTION INFILTRATION; PERINEURAL
Status: DISCONTINUED | OUTPATIENT
Start: 2023-07-26 | End: 2023-09-20

## 2023-07-26 RX ADMIN — LIDOCAINE HYDROCHLORIDE 5 ML: 10 INJECTION, SOLUTION INFILTRATION; PERINEURAL at 11:07

## 2023-07-26 RX ADMIN — METHYLPREDNISOLONE ACETATE 40 MG: 40 INJECTION, SUSPENSION INTRA-ARTICULAR; INTRALESIONAL; INTRAMUSCULAR; SOFT TISSUE at 11:07

## 2023-07-26 NOTE — LETTER
7/26/2023         RE: Nimco Knutson  [address] [place] [address]        Dear Colleague,    Thank you for referring your patient, Nimco Knutson, to the [facility] [place]. Please see a copy of my visit note below.    ASSESSMENT & PLAN  Patient Instructions     1. Primary osteoarthritis of right knee    2. Right knee pain, unspecified chronicity    3. Strain of calf muscle, right, initial encounter      -Patient has right knee pain and swelling due to arthritis and leg pain due to calf strain  -Tolerated right knee intra-articular cortisone injection today without complications.  Patient was given postprocedure instructions  -Patient will start formal physical therapy and home exercise program  -Patient will follow-up if pain returns  -Call direct clinic number [[phone]] at any time with questions or concerns.    Albert Yeo MD [facility]  [facility]      -----    SUBJECTIVE  Nimco Knutson is a/an 72 year old male who is seen in consultation at the request of  Ari Bobby N.P. for evaluation of right knee pain. The patient is seen by themselves.    Onset: 2 years(s) ago. Reports insidious onset without acute precipitating event.  Location of Pain: right knee joint.  Rating of Pain at worst: 7/10  Rating of Pain Currently: 2/10  Worsened by: walking, use, and movement  Better with: no treatments pt has tried has helped.  Treatments tried: ice and ibuprofen  Associated symptoms: swelling  Orthopedic history: NO Xrays dec 2021- Mild rt knee tricompartmental OA.   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    Past Medical History:   Diagnosis Date     Hepatitis B carrier (H)      Mumps      Sciatica      Social History     Socioeconomic History     Marital status:      Number of children: 3   Tobacco Use     Smoking status: Every Day     Packs/day:  0.50     Years: 40.00     Pack years: 20.00     Types: Cigarettes     Smokeless tobacco: Never     Tobacco comments:     Less than 1/2 pack a day   Vaping Use     Vaping Use: Never used   Substance and Sexual Activity     Alcohol use: No     Alcohol/week: 0.0 standard drinks of alcohol     Drug use: No     Sexual activity: Yes     Partners: Female   Other Topics Concern     Caffeine Concern No     Occupational Exposure No     Hobby Hazards No     Sleep Concern No     Stress Concern No     Weight Concern No     Special Diet No     Back Care No     Exercise Yes     Seat Belt Yes         Patient's past medical, surgical, social, and family histories were reviewed today and no changes are noted.    REVIEW OF SYSTEMS:  10 point ROS is negative other than symptoms noted above in HPI, Past Medical History or as stated below  Constitutional: NEGATIVE for fever, chills, change in weight  Skin: NEGATIVE for worrisome rashes, moles or lesions  GI/: NEGATIVE for bowel or bladder changes  Neuro: NEGATIVE for weakness, dizziness or paresthesias    OBJECTIVE:  /72   Wt 58.5 kg (129 lb)   BMI 22.13 kg/m     General: healthy, alert and in no distress  HEENT: no scleral icterus or conjunctival erythema  Skin: no suspicious lesions or rash. No jaundice.  CV: no pedal edema  Resp: normal respiratory effort without conversational dyspnea   Psych: normal mood and affect  Gait: normal steady gait with appropriate coordination and balance  Neuro: Normal light sensory exam of lower extremity  MSK:  RIGHT KNEE  Inspection:    normal alignment  Palpation:    Tender about the lateral joint line and medial joint line. Remainder of bony and ligamentous landmarks are nontender.    Moderate effusion is present    Patellofemoral crepitus is Present  Range of Motion:     00 extension to 1100 flexion  Strength:    Quadriceps grossly intact    Extensor mechanism intact  Special Tests:    Positive: none    Negative: Patellar grind, patellar  apprehension, MCL/valgus stress (0 & 30 deg), LCL/varus stress (0 & 30 deg), Lachman's, anterior drawer, posterior drawer, Kolton's    Independent visualization of the below image:  No results found for this or any previous visit (from the past 24 hour(s)).  EXAM: XR KNEE RIGHT 3 VIEWS  LOCATION: [facility]  DATE/TIME: 12/29/2021 4:49 PM     INDICATION:  Right knee pain, unspecified chronicity.  COMPARISON: None.                                                                    IMPRESSION: Mild tricompartmental osteoarthrosis. No fracture. Trace effusion. No calcified intra-articular body.    Large Joint Injection/Arthocentesis: R knee joint    Date/Time: 7/26/2023 11:07 AM    Performed by: Yeo, Albert, MD  Authorized by: Yeo, Albert, MD    Indications:  Pain and osteoarthritis  Needle Size:  25 G  Guidance: ultrasound    Approach:  Superolateral  Location:  Knee      Medications:  40 mg methylPREDNISolone 40 MG/ML; 5 mL lidocaine 1 %  Aspirate amount (mL):  14  Aspirate:  Yellow  Outcome:  Tolerated well, no immediate complications  Procedure discussed: discussed risks, benefits, and alternatives    Consent Given by:  Patient  Timeout: timeout called immediately prior to procedure    Prep: patient was prepped and draped in usual sterile fashion          Albert Yeo MD [facility]      Again, thank you for allowing me to participate in the care of your patient.        Sincerely,        Albert Yeo, MD

## 2023-07-26 NOTE — PATIENT INSTRUCTIONS
1. Primary osteoarthritis of right knee    2. Right knee pain, unspecified chronicity    3. Strain of calf muscle, right, initial encounter      -Patient has right knee pain and swelling due to arthritis and leg pain due to calf strain  -Tolerated right knee intra-articular cortisone injection today without complications.  Patient was given postprocedure instructions  -Patient will start formal physical therapy and home exercise program  -Patient will follow-up if pain returns  -Call direct clinic number [[phone]] at any time with questions or concerns.    Albert Yeo MD CAQSM  [facility]  [facility] [place]

## 2023-08-07 ENCOUNTER — TELEPHONE (OUTPATIENT)
Dept: ORTHOPEDICS | Facility: CLINIC | Age: 73
End: 2023-08-07
Payer: COMMERCIAL

## 2023-08-07 NOTE — TELEPHONE ENCOUNTER
Patient saw Dr Yeo on July 26th for his knee, it has not gotten any better so he is wanting to followup with Dr Yeo. Patients number is [phone]

## 2023-08-07 NOTE — TELEPHONE ENCOUNTER
Called patient and left brief message. Instructed patient to call back to receive more information on his knee pain.    Consent to communicate on file.    Radha Alford ATC

## 2023-08-08 NOTE — TELEPHONE ENCOUNTER
Reason for call:  Patient stated he saw provider for knee pain on 7/26 and is not improving. Would like to discuss options.    Cell number on file:    Telephone Information:   mobile [phone]

## 2023-08-08 NOTE — TELEPHONE ENCOUNTER
Called patient to get more information on his knee after his visit with Dr. Yeo on 7/26/23. He received a cortisone injection of the R knee which provided him with 2-3 days of relief before becoming painful again. He would like to schedule with Dr. Yeo again to discuss other options for his knee pain (likely not another injection) - though Dr. Yeo is not available until 9/20/23. I did give him the option to schedule with another provider if he would like to be seen sooner. Patient stated he liked working with Dr. Yeo and would be able to wait until 9/20 to see him.    Scheduled patient for 11:20 am on Wednesday, 9/20/23, for a 20 minute appointment. Also added patient to wait list in case he could be seen sooner.    Patient verbalized understanding and agreement.    Radha Alford, ATC

## 2023-09-20 ENCOUNTER — OFFICE VISIT (OUTPATIENT)
Dept: ORTHOPEDICS | Facility: CLINIC | Age: 73
End: 2023-09-20
Payer: COMMERCIAL

## 2023-09-20 VITALS
WEIGHT: 129 LBS | SYSTOLIC BLOOD PRESSURE: 130 MMHG | DIASTOLIC BLOOD PRESSURE: 60 MMHG | BODY MASS INDEX: 22.02 KG/M2 | HEIGHT: 64 IN

## 2023-09-20 DIAGNOSIS — M17.11 PRIMARY OSTEOARTHRITIS OF RIGHT KNEE: Primary | ICD-10-CM

## 2023-09-20 PROCEDURE — 99213 OFFICE O/P EST LOW 20 MIN: CPT | Performed by: FAMILY MEDICINE

## 2023-09-20 RX ORDER — DICLOFENAC SODIUM 75 MG/1
75 TABLET, DELAYED RELEASE ORAL 2 TIMES DAILY PRN
Qty: 30 TABLET | Refills: 0 | Status: SHIPPED | OUTPATIENT
Start: 2023-09-20

## 2023-09-20 NOTE — LETTER
9/20/2023         RE: Nimco Knutson  [address] [place] [address]        Dear Colleague,    Thank you for referring your patient, Nimco Knutson, to the [facility] [place]. Please see a copy of my visit note below.    ASSESSMENT & PLAN  Patient Instructions     1. Primary osteoarthritis of right knee      -Patient is following up for chronic right knee pain and swelling due to arthritis  -Patient reports only few days of relief from his recent cortisone injection  -Prior authorization for Synvisc 1 was ordered today  -Patient will start diclofenac 75 mg twice a day as needed for the next 2 weeks until he can return for his viscosupplementation injection  -Patient should ice the knee as needed for pain and swelling  -Patient was given a form for handicap placard  -Call direct clinic number [[phone]] at any time with questions or concerns.    Albert Yeo MD [facility] Orthoped[facility] and Sports Medicine  [facility]        -----    SUBJECTIVE:  Nimco Knutson is a 72 year old male who is seen in follow-up for right knee pain.They were last seen 7/26/2023 for right knee OA and a strain of his right calf muscle.     Since their last visit reports 0% - (About the same as last time). He still notes pain with walking and also at night. He reports swelling in the knee. They indicate that pain is constant but wanes and waxes. They have tried ice, ibuprofen, corticosteroid injection (most recent date: 7/26/2023) that provided  2-3 day(s) of relief.      The patient is seen by themselves.    Patient's past medical, surgical, social, and family histories were reviewed today and no changes are noted.    REVIEW OF SYSTEMS:  Constitutional: NEGATIVE for fever, chills, change in weight  Skin: NEGATIVE for worrisome rashes, moles or lesions  GI/: NEGATIVE for bowel or bladder changes  Neuro: NEGATIVE for weakness, dizziness or paresthesias    OBJECTIVE:  /60  "  Ht 1.626 m (5' 4.02\")   Wt 58.5 kg (129 lb)   BMI 22.13 kg/m     General: healthy, alert and in no distress  HEENT: no scleral icterus or conjunctival erythema  Skin: no suspicious lesions or rash. No jaundice.  CV: regular rhythm by palpation, no pedal edema  Resp: normal respiratory effort without conversational dyspnea   Psych: normal mood and affect  Gait: normal steady gait with appropriate coordination and balance  Neuro: normal light touch sensory exam of the extremities.    MSK:  RIGHT KNEE  Inspection:    normal alignment  Palpation:    Tender about the lateral joint line and medial joint line. Remainder of bony and ligamentous landmarks are nontender.    Moderate effusion is present    Patellofemoral crepitus is Present  Range of Motion:     00 extension to 1100 flexion  Strength:    Quadriceps grossly intact    Extensor mechanism intact  Special Tests:    Positive: none    Negative: Patellar grind, patellar apprehension, MCL/valgus stress (0 & 30 deg), LCL/varus stress (0 & 30 deg), Lachman's, anterior drawer, posterior drawer, Kolton's    Independent visualization of the below image:  EXAM: XR KNEE RIGHT 3 VIEWS  LOCATION: [facility]  DATE/TIME: 12/29/2021 4:49 PM     INDICATION:  Right knee pain, unspecified chronicity.  COMPARISON: None.                                                                    IMPRESSION: Mild tricompartmental osteoarthrosis. No fracture. Trace effusion. No calcified intra-articular body.        Albert Yeo MD, [facility]        Again, thank you for allowing me to participate in the care of your patient.        Sincerely,        Albert Yeo, MD  "

## 2023-09-20 NOTE — PROGRESS NOTES
"ASSESSMENT & PLAN  Patient Instructions     1. Primary osteoarthritis of right knee      -Patient is following up for chronic right knee pain and swelling due to arthritis  -Patient reports only few days of relief from his recent cortisone injection  -Prior authorization for Synvisc 1 was ordered today  -Patient will start diclofenac 75 mg twice a day as needed for the next 2 weeks until he can return for his viscosupplementation injection  -Patient should ice the knee as needed for pain and swelling  -Patient was given a form for handicap placard  -Call direct clinic number [[phone]] at any time with questions or concerns.    Albert Yeo MD [facility] Orthopedics and Sports Medicine  [facility]        -----    SUBJECTIVE:  Nimco Knutson is a 72 year old male who is seen in follow-up for right knee pain.They were last seen 7/26/2023 for right knee OA and a strain of his right calf muscle.     Since their last visit reports 0% - (About the same as last time). He still notes pain with walking and also at night. He reports swelling in the knee. They indicate that pain is constant but wanes and waxes. They have tried ice, ibuprofen, corticosteroid injection (most recent date: 7/26/2023) that provided  2-3 day(s) of relief.      The patient is seen by themselves.    Patient's past medical, surgical, social, and family histories were reviewed today and no changes are noted.    REVIEW OF SYSTEMS:  Constitutional: NEGATIVE for fever, chills, change in weight  Skin: NEGATIVE for worrisome rashes, moles or lesions  GI/: NEGATIVE for bowel or bladder changes  Neuro: NEGATIVE for weakness, dizziness or paresthesias    OBJECTIVE:  /60   Ht 1.626 m (5' 4.02\")   Wt 58.5 kg (129 lb)   BMI 22.13 kg/m     General: healthy, alert and in no distress  HEENT: no scleral icterus or conjunctival erythema  Skin: no suspicious lesions or rash. No jaundice.  CV: regular rhythm by palpation, no pedal " edema  Resp: normal respiratory effort without conversational dyspnea   Psych: normal mood and affect  Gait: normal steady gait with appropriate coordination and balance  Neuro: normal light touch sensory exam of the extremities.    MSK:  RIGHT KNEE  Inspection:    normal alignment  Palpation:    Tender about the lateral joint line and medial joint line. Remainder of bony and ligamentous landmarks are nontender.    Moderate effusion is present    Patellofemoral crepitus is Present  Range of Motion:     00 extension to 1100 flexion  Strength:    Quadriceps grossly intact    Extensor mechanism intact  Special Tests:    Positive: none    Negative: Patellar grind, patellar apprehension, MCL/valgus stress (0 & 30 deg), LCL/varus stress (0 & 30 deg), Lachman's, anterior drawer, posterior drawer, Kolton's    Independent visualization of the below image:  EXAM: XR KNEE RIGHT 3 VIEWS  LOCATION: [facility]  DATE/TIME: 12/29/2021 4:49 PM     INDICATION:  Right knee pain, unspecified chronicity.  COMPARISON: None.                                                                    IMPRESSION: Mild tricompartmental osteoarthrosis. No fracture. Trace effusion. No calcified intra-articular body.        Albert Yeo MD, [facility] Orthopedic Care

## 2023-09-20 NOTE — PATIENT INSTRUCTIONS
1. Primary osteoarthritis of right knee      -Patient is following up for chronic right knee pain and swelling due to arthritis  -Patient reports only few days of relief from his recent cortisone injection  -Prior authorization for Synvisc 1 was ordered today  -Patient will start diclofenac 75 mg twice a day as needed for the next 2 weeks until he can return for his viscosupplementation injection  -Patient should ice the knee as needed for pain and swelling  -Patient was given a form for handicap placard  -Call direct clinic number [[phone]] at any time with questions or concerns.    Albert Yeo MD [facility]  [facility] [place]

## 2023-10-04 ENCOUNTER — OFFICE VISIT (OUTPATIENT)
Dept: ORTHOPEDICS | Facility: CLINIC | Age: 73
End: 2023-10-04
Payer: COMMERCIAL

## 2023-10-04 DIAGNOSIS — M17.11 PRIMARY OSTEOARTHRITIS OF RIGHT KNEE: Primary | ICD-10-CM

## 2023-10-04 PROCEDURE — 20611 DRAIN/INJ JOINT/BURSA W/US: CPT | Mod: RT | Performed by: FAMILY MEDICINE

## 2023-10-04 RX ORDER — LIDOCAINE HYDROCHLORIDE 10 MG/ML
5 INJECTION, SOLUTION INFILTRATION; PERINEURAL
Status: SHIPPED | OUTPATIENT
Start: 2023-10-04

## 2023-10-04 RX ADMIN — LIDOCAINE HYDROCHLORIDE 5 ML: 10 INJECTION, SOLUTION INFILTRATION; PERINEURAL at 09:50

## 2023-10-04 NOTE — PATIENT INSTRUCTIONS
1. Primary osteoarthritis of right knee      -Patient is following up for chronic right knee pain due to arthritis  -Patient tolerated right knee Synvisc 1 injection today without complications.  Patient was given postprocedure instructions  -Patient will follow up when pain returns  -Call direct clinic number [[phone]] at any time with questions or concerns.    Albert Yeo MD CAM  [place] [facility]  [facility] [place]

## 2023-10-04 NOTE — LETTER
10/4/2023         RE: Nimco Knutson  [address] [facility] [address]        Dear Colleague,    Thank you for referring your patient, Nimco Knutson, to the [facility] [place]. Please see a copy of my visit note below.    ASSESSMENT & PLAN  Patient Instructions     1. Primary osteoarthritis of right knee      -Patient is following up for chronic right knee pain due to arthritis  -Patient tolerated right knee Synvisc 1 injection today without complications.  Patient was given postprocedure instructions  -Patient will follow up when pain returns  -Call direct clinic number [[phone]] at any time with questions or concerns.    Albert Yeo MD [facility] Orthopedics and Sports Medicine  [facility]        -----    SUBJECTIVE:  Nimco Knutson is a 72 year old male who is seen for US guided right knee Synvisc One injection.    Large Joint Injection/Arthocentesis: R knee joint    Date/Time: 10/4/2023 9:50 AM    Performed by: Yeo, Albert, MD  Authorized by: Yeo, Albert, MD    Indications:  Pain and osteoarthritis  Needle Size:  22 G  Guidance: ultrasound    Approach:  Anterolateral  Location:  Knee      Medications:  48 mg hylan 48 MG/6ML; 5 mL lidocaine 1 %  Outcome:  Tolerated well, no immediate complications  Procedure discussed: discussed risks, benefits, and alternatives    Consent Given by:  Patient  Timeout: timeout called immediately prior to procedure    Prep: patient was prepped and draped in usual sterile fashion     Ultrasound was used to ensure safe and accurate needle placement and injection. Ultrasound images of the procedure were permanently stored.        Patient rates pain as 4-5/10 pre-procedure. Patient rates pain as 4/10 post-procedure.      Albert Yeo MD, [facility]      Again, thank you for allowing me to participate in the care of your patient.        Sincerely,        Albert Yeo, MD

## 2023-10-04 NOTE — PROGRESS NOTES
ASSESSMENT & PLAN  Patient Instructions     1. Primary osteoarthritis of right knee      -Patient is following up for chronic right knee pain due to arthritis  -Patient tolerated right knee Synvisc 1 injection today without complications.  Patient was given postprocedure instructions  -Patient will follow up when pain returns  -Call direct clinic number [[phone]] at any time with questions or concerns.    Albert Yeo MD [facility] Orthopedics and Sports Medicine  [facility]        -----    SUBJECTIVE:  Nimco Knutson is a 72 year old male who is seen for US guided right knee Synvisc One injection.    Large Joint Injection/Arthocentesis: R knee joint    Date/Time: 10/4/2023 9:50 AM    Performed by: Yeo, Albert, MD  Authorized by: Yeo, Albert, MD    Indications:  Pain and osteoarthritis  Needle Size:  22 G  Guidance: ultrasound    Approach:  Anterolateral  Location:  Knee      Medications:  48 mg hylan 48 MG/6ML; 5 mL lidocaine 1 %  Outcome:  Tolerated well, no immediate complications  Procedure discussed: discussed risks, benefits, and alternatives    Consent Given by:  Patient  Timeout: timeout called immediately prior to procedure    Prep: patient was prepped and draped in usual sterile fashion     Ultrasound was used to ensure safe and accurate needle placement and injection. Ultrasound images of the procedure were permanently stored.        Patient rates pain as 4-5/10 pre-procedure. Patient rates pain as 4/10 post-procedure.      Albert Yeo MD, [facility]

## 2024-01-29 DIAGNOSIS — R05.3 CHRONIC COUGH: ICD-10-CM

## 2024-01-29 DIAGNOSIS — R10.13 EPIGASTRIC PAIN: ICD-10-CM

## 2024-01-30 DIAGNOSIS — M17.11 PRIMARY OSTEOARTHRITIS OF RIGHT KNEE: ICD-10-CM

## 2024-01-30 NOTE — LETTER
February 6, 2024      Nimco Knutson  [address] [place] [address]        Dear Nimco,     We have made 3 attempts to contact you regarding a refill request from [facility] for Diclofenac 75mg.  Please contact the clinic for further information or schedule an appointment to further discuss if you are still needing a refill. Otherwise, you may disregard this letter.       Sincerely,        Albert Yeo, MD

## 2024-01-31 NOTE — TELEPHONE ENCOUNTER
Medication Request for: Diclofenac 75mg            Prescription last written on 9/20/23 by Dr. Yeo   Sig: take 1 tab twice daily prn    Last Fill Quantity: 30 with # refills: 0     Last Office Visit by provider: 10/4/23  Synvisc 1 injection of right knee given at that time.     [employer] message sent.     KRISTINA Khan RN

## 2024-02-02 NOTE — TELEPHONE ENCOUNTER
Patient has not read [employer] message.   There is a consent to communicate on file for patient and wife.     Left voicemail on cell asking for a return call regarding the refill request for Diclofenac 75mg tablets. Asked that he call back to let us know if he requested the refill or not and how much relief he got from the Synvisc injection on 10/4/24.  number was provided.     KRISTINA Khan RN

## 2024-02-05 NOTE — TELEPHONE ENCOUNTER
3rd attempt to reach patient. Call went straight to voicemail. Left voicemail asking for a return call regarding request for Diclofenac.     KRISTINA Khan RN

## 2024-02-06 RX ORDER — DICLOFENAC SODIUM 75 MG/1
TABLET, DELAYED RELEASE ORAL
Qty: 30 TABLET | Refills: 0 | OUTPATIENT
Start: 2024-02-06

## 2024-05-29 ENCOUNTER — PATIENT OUTREACH (OUTPATIENT)
Dept: CARE COORDINATION | Facility: CLINIC | Age: 74
End: 2024-05-29
Payer: COMMERCIAL

## 2024-06-12 ENCOUNTER — PATIENT OUTREACH (OUTPATIENT)
Dept: CARE COORDINATION | Facility: CLINIC | Age: 74
End: 2024-06-12
Payer: COMMERCIAL

## 2024-08-25 ENCOUNTER — HEALTH MAINTENANCE LETTER (OUTPATIENT)
Age: 74
End: 2024-08-25

## 2024-10-14 ENCOUNTER — TELEPHONE (OUTPATIENT)
Dept: INTERNAL MEDICINE | Facility: CLINIC | Age: 74
End: 2024-10-14
Payer: COMMERCIAL

## 2024-10-14 DIAGNOSIS — Z87.891 PERSONAL HISTORY OF TOBACCO USE, PRESENTING HAZARDS TO HEALTH: Primary | ICD-10-CM

## 2024-10-14 NOTE — TELEPHONE ENCOUNTER
S-(situation): patient requesting lung screening scan     B-(background): patient has not been seen since 6/2023, patient's last CT chest was 1/2022    A-(assessment): Patient requesting scan prior to appointment so results can be discussed at appointment.   Appointments in Next Year      Dec 10, 2024 11:00 AM  (Arrive by 10:40 AM)  Welcome to Medicare Visit with Caleb Napier MD  [facility] ([facility] ) [phone]             R-(recommendations): routing to PCP for review.     Summer RN 5:15 PM October 14, 2024   [facility]

## 2024-10-15 NOTE — TELEPHONE ENCOUNTER
Patient calls back.     Smoked 8 cigarettes/day for 50 years    Then cut down to 4 cigarettes a day the past 3 months.     Routing to provider to review and advise.     RAHEEL MERAZ RN on 10/15/2024 at 5:06 PM   [facility]

## 2024-10-15 NOTE — TELEPHONE ENCOUNTER
Called patient and left message to call the clinic back.     Upon call back, please transfer patient to a nurse.

## 2024-10-15 NOTE — TELEPHONE ENCOUNTER
Per message patient has appointment scheduled 12/10/24 but is requesting scan prior to appointment.

## 2024-10-16 NOTE — TELEPHONE ENCOUNTER
Call to patient to notify him that CT was ordered. Patient says he has radiology phone number and will contact them to set up imaging appointment.     Thank you,  Ricardo Simpson, Triage RN [place]  9:30 AM 10/16/2024

## 2024-11-06 ENCOUNTER — HOSPITAL ENCOUNTER (OUTPATIENT)
Dept: CT IMAGING | Facility: CLINIC | Age: 74
Discharge: HOME | End: 2024-11-06
Attending: INTERNAL MEDICINE | Admitting: INTERNAL MEDICINE
Payer: COMMERCIAL

## 2024-11-06 DIAGNOSIS — Z87.891 PERSONAL HISTORY OF TOBACCO USE, PRESENTING HAZARDS TO HEALTH: ICD-10-CM

## 2024-11-06 PROCEDURE — 71271 CT THORAX LUNG CANCER SCR C-: CPT

## 2024-11-07 ENCOUNTER — TELEPHONE (OUTPATIENT)
Dept: INTERNAL MEDICINE | Facility: CLINIC | Age: 74
End: 2024-11-07
Payer: COMMERCIAL

## 2024-11-07 NOTE — TELEPHONE ENCOUNTER
Attempted to contact pt x 1. LM to call clinic.   Also sent mychart message with Dr Napier recommendations.     Caleb Napier MD  11/6/2024  4:52 PM CST       Small lung nodules, stable.  Coronary calcifications.  Keep statin  treatment, diet and exercise.  Follow up in 12 months CT.

## 2024-12-10 ENCOUNTER — OFFICE VISIT (OUTPATIENT)
Dept: INTERNAL MEDICINE | Facility: CLINIC | Age: 74
End: 2024-12-10
Payer: COMMERCIAL

## 2024-12-10 VITALS
SYSTOLIC BLOOD PRESSURE: 115 MMHG | HEART RATE: 83 BPM | HEIGHT: 62 IN | DIASTOLIC BLOOD PRESSURE: 74 MMHG | TEMPERATURE: 96.6 F | RESPIRATION RATE: 12 BRPM | BODY MASS INDEX: 24.42 KG/M2 | OXYGEN SATURATION: 97 % | WEIGHT: 132.7 LBS

## 2024-12-10 DIAGNOSIS — H81.12 BENIGN PAROXYSMAL POSITIONAL VERTIGO OF LEFT EAR: ICD-10-CM

## 2024-12-10 DIAGNOSIS — E78.5 HYPERLIPIDEMIA LDL GOAL <130: ICD-10-CM

## 2024-12-10 DIAGNOSIS — R05.3 CHRONIC COUGH: ICD-10-CM

## 2024-12-10 DIAGNOSIS — B18.1 HEPATITIS B CARRIER (H): ICD-10-CM

## 2024-12-10 DIAGNOSIS — Z29.11 NEED FOR VACCINATION AGAINST RESPIRATORY SYNCYTIAL VIRUS: ICD-10-CM

## 2024-12-10 DIAGNOSIS — R10.13 EPIGASTRIC PAIN: ICD-10-CM

## 2024-12-10 DIAGNOSIS — R22.0 NODULE OF TONGUE: ICD-10-CM

## 2024-12-10 DIAGNOSIS — K05.10 GINGIVITIS: ICD-10-CM

## 2024-12-10 DIAGNOSIS — Z11.59 NEED FOR HEPATITIS C SCREENING TEST: ICD-10-CM

## 2024-12-10 DIAGNOSIS — Z23 NEED FOR SHINGLES VACCINE: ICD-10-CM

## 2024-12-10 DIAGNOSIS — Z12.5 SCREENING FOR PROSTATE CANCER: ICD-10-CM

## 2024-12-10 DIAGNOSIS — Z00.00 ENCOUNTER FOR PREVENTATIVE ADULT HEALTH CARE EXAMINATION: Primary | ICD-10-CM

## 2024-12-10 LAB
ALBUMIN SERPL BCG-MCNC: 4.4 G/DL (ref 3.5–5.2)
ALBUMIN UR-MCNC: NEGATIVE MG/DL
ALP SERPL-CCNC: 104 U/L (ref 40–150)
ALT SERPL W P-5'-P-CCNC: 19 U/L (ref 0–70)
ANION GAP SERPL CALCULATED.3IONS-SCNC: 7 MMOL/L (ref 7–15)
APPEARANCE UR: CLEAR
AST SERPL W P-5'-P-CCNC: 29 U/L (ref 0–45)
BACTERIA #/AREA URNS HPF: ABNORMAL /HPF
BILIRUB SERPL-MCNC: 0.4 MG/DL
BILIRUB UR QL STRIP: NEGATIVE
BUN SERPL-MCNC: 18.2 MG/DL (ref 8–23)
CALCIUM SERPL-MCNC: 9.5 MG/DL (ref 8.8–10.4)
CHLORIDE SERPL-SCNC: 105 MMOL/L (ref 98–107)
CHOLEST SERPL-MCNC: 226 MG/DL
COLOR UR AUTO: YELLOW
CREAT SERPL-MCNC: 0.97 MG/DL (ref 0.67–1.17)
EGFRCR SERPLBLD CKD-EPI 2021: 82 ML/MIN/1.73M2
ERYTHROCYTE [DISTWIDTH] IN BLOOD BY AUTOMATED COUNT: 11.9 % (ref 10–15)
FASTING STATUS PATIENT QL REPORTED: YES
FASTING STATUS PATIENT QL REPORTED: YES
GLUCOSE SERPL-MCNC: 97 MG/DL (ref 70–99)
GLUCOSE UR STRIP-MCNC: NEGATIVE MG/DL
HCO3 SERPL-SCNC: 29 MMOL/L (ref 22–29)
HCT VFR BLD AUTO: 44.7 % (ref 40–53)
HDLC SERPL-MCNC: 70 MG/DL
HGB BLD-MCNC: 15.2 G/DL (ref 13.3–17.7)
HGB UR QL STRIP: NEGATIVE
KETONES UR STRIP-MCNC: NEGATIVE MG/DL
LDLC SERPL CALC-MCNC: 142 MG/DL
LEUKOCYTE ESTERASE UR QL STRIP: NEGATIVE
MCH RBC QN AUTO: 31 PG (ref 26.5–33)
MCHC RBC AUTO-ENTMCNC: 34 G/DL (ref 31.5–36.5)
MCV RBC AUTO: 91 FL (ref 78–100)
MUCOUS THREADS #/AREA URNS LPF: PRESENT /LPF
NITRATE UR QL: NEGATIVE
NONHDLC SERPL-MCNC: 156 MG/DL
PH UR STRIP: 5.5 [PH] (ref 5–7)
PLATELET # BLD AUTO: 153 10E3/UL (ref 150–450)
POTASSIUM SERPL-SCNC: 4.7 MMOL/L (ref 3.4–5.3)
PROT SERPL-MCNC: 7.2 G/DL (ref 6.4–8.3)
PSA SERPL DL<=0.01 NG/ML-MCNC: 4.05 NG/ML (ref 0–6.5)
RBC # BLD AUTO: 4.91 10E6/UL (ref 4.4–5.9)
RBC #/AREA URNS AUTO: ABNORMAL /HPF
SODIUM SERPL-SCNC: 141 MMOL/L (ref 135–145)
SP GR UR STRIP: 1.02 (ref 1–1.03)
TRIGL SERPL-MCNC: 71 MG/DL
TSH SERPL DL<=0.005 MIU/L-ACNC: 0.73 UIU/ML (ref 0.3–4.2)
UROBILINOGEN UR STRIP-ACNC: 0.2 E.U./DL
WBC # BLD AUTO: 6.7 10E3/UL (ref 4–11)
WBC #/AREA URNS AUTO: ABNORMAL /HPF

## 2024-12-10 RX ORDER — ATORVASTATIN CALCIUM 10 MG/1
10 TABLET, FILM COATED ORAL DAILY
Qty: 90 TABLET | Refills: 3 | Status: SHIPPED | OUTPATIENT
Start: 2024-12-10

## 2024-12-10 RX ORDER — DOXYCYCLINE 100 MG/1
100 CAPSULE ORAL 2 TIMES DAILY
Qty: 20 CAPSULE | Refills: 0 | Status: SHIPPED | OUTPATIENT
Start: 2024-12-10

## 2024-12-10 ASSESSMENT — PAIN SCALES - GENERAL: PAINLEVEL_OUTOF10: NO PAIN (0)

## 2024-12-10 NOTE — PROGRESS NOTES
Preventive Care Visit  [facility]  Caleb Napier MD, Internal Medicine  Dec 10, 2024      Assessment & Plan         Hyperlipidemia LDL goal <130  Assess lab work, on statin   - Lipid panel reflex to direct LDL Non-fasting  - atorvastatin (LIPITOR) 10 MG tablet; Take 1 tablet (10 mg) by mouth daily.  - OFFICE/OUTPT VISIT,EST,LEVL IV      Chronic cough  Smoking cessation advised   Continue PPI  - omeprazole (PRILOSEC) 20 MG DR capsule; Take 1 capsule (20 mg) by mouth daily.  - OFFICE/OUTPT VISIT,EST,LEVL IV    Epigastric pain  On treatment, controlled   - omeprazole (PRILOSEC) 20 MG DR capsule; Take 1 capsule (20 mg) by mouth daily.  - OFFICE/OUTPT VISIT,ESTMASHAL IV    Encounter for preventative adult health care examination  advised regular aerobic activity, low cholesterol, low salt diet, wearing seat belt,  self examinations, sunscreen protection.Obtain screening cholesterol, immunizations reviewed.    - CBC with platelets  - Comprehensive metabolic panel (BMP + Alb, Alk Phos, ALT, AST, Total. Bili, TP)  - TSH with free T4 reflex  - PSA, screen  - UA with Microscopic reflex to Culture - lab collect  - UA Microscopic with Reflex to Culture    Screening for prostate cancer    - PSA, screen    Nodule of tongue  Refer to ENT   - Adult ENT  Referral; Future  - OFFICE/OUTPT VISIT,ESTMASHAL IV    Benign paroxysmal positional vertigo of left ear  Refer for PT, clinically BPPV  - Physical Therapy  Referral; Future  - OFFICE/OUTPT VISIT,EST,LEVL IV    Gingivitis    - doxycycline hyclate (VIBRAMYCIN) 100 MG capsule; Take 1 capsule (100 mg) by mouth 2 times daily.  - OFFICE/OUTPT VISIT,EST,LEVL IV    Hepatitis B carrier (H)  Monitor LFT          Nicotine/Tobacco Cessation  He reports that he has been smoking cigarettes. He has a 20 pack-year smoking history. He has never used smokeless tobacco.  Nicotine/Tobacco Cessation Plan  Information offered: Patient not interested at this  time        See Patient Instructions    Subjective   Nimco is a 74 year old, presenting for the following:  Wellness Visit        12/10/2024    10:45 AM   Additional Questions   Roomed by Jacqueline JACKSON   Accompanied by n/a          HPI    Has h/o GERD on PPI treatment. symptoms are controlled. No nausea, vomiting, heartburns, bloating.  Has h/o smoking, has chronic cough, CT lungs shows small nodules and COPD changes.   Concern for a nodule in the middle of the tongue. No pain. Has had it for 2 years.   Has had episodes of vertigo past month, related to turning of the head to the left. Resolves after 20-30 seconds. No HA, neurologic deficits.   Has H/O hyperlipidemia. On medical treatment and diet. No side effects. No muscle weakness, myalgias or upset stomach.   Has concern for inflammation of the gums, swelling, pain. Seen dentist, referred for periodontist.           Health Care Directive  Patient does not have a Health Care Directive: Discussed advance care planning with patient; however, patient declined at this time.      6/28/2023   General Health   How would you rate your overall physical health? Excellent            6/28/2023   Nutrition   At least 4 servings of fruits and vegetables/day No            6/28/2023   Exercise   Frequency of exercise: 1 day/week               6/28/2023   Activities of Daily Living- Home Safety   Needs help with the following daily activites NO assistance is needed   Safety concerns in the home None of the above            9/17/2018   Dental   Dentist two times every year? Yes            6/28/2023   Hearing Screening   Hearing concerns? No concerns               No data to display                     Today's PHQ-2 Score:       12/10/2024    10:46 AM   PHQ-2 ( 1999 [employer])   Q1: Little interest or pleasure in doing things 0   Q2: Feeling down, depressed or hopeless 0   PHQ-2 Score 0           6/28/2023   Substance Use   Alcohol more than 3/day or more than 7/wk Not Applicable         Social History     Tobacco Use    Smoking status: Every Day     Current packs/day: 0.50     Average packs/day: 0.5 packs/day for 40.0 years (20.0 ttl pk-yrs)     Types: Cigarettes    Smokeless tobacco: Never    Tobacco comments:     Less than 1/2 pack a day   Vaping Use    Vaping status: Never Used   Substance Use Topics    Alcohol use: No     Alcohol/week: 0.0 standard drinks of alcohol    Drug use: No       ASCVD Risk   The 10-year ASCVD risk score (Jonah MAN, et al., 2019) is: 21%    Values used to calculate the score:      Age: 74 years      Sex: Male      Is Non- : No      Diabetic: No      Tobacco smoker: Yes      Systolic Blood Pressure: 115 mmHg      Is BP treated: No      HDL Cholesterol: 64 mg/dL      Total Cholesterol: 228 mg/dL            Reviewed and updated as needed this visit by Provider                    Lab work is in process  Labs reviewed in EPIC  Current providers sharing in care for this patient include:  Patient Care Team:  Caleb Napier MD as PCP - General (Internal Medicine)  Yeo, Albert, MD as Assigned Musculoskeletal Provider  Caleb Napier MD as Assigned PCP    The following health maintenance items are reviewed in Epic and correct as of today:  Health Maintenance   Topic Date Due    HEPATITIS C SCREENING  Never done    ZOSTER IMMUNIZATION (1 of 2) Never done    RSV VACCINE (1 - Risk 60-74 years 1-dose series) Never done    AORTIC ANEURYSM SCREENING (SYSTEM ASSIGNED)  Never done    ANNUAL REVIEW OF HM ORDERS  09/02/2023    NICOTINE/TOBACCO CESSATION COUNSELING Q 1 YR  06/28/2024    MEDICARE ANNUAL WELLNESS VISIT  06/28/2024    LIPID  06/28/2024    INFLUENZA VACCINE (1) 09/01/2024    COVID-19 Vaccine (4 - 2024-25 season) 09/01/2024    LUNG CANCER SCREENING  11/06/2025    FALL RISK ASSESSMENT  12/10/2025    GLUCOSE  06/02/2026    COLORECTAL CANCER SCREENING  01/28/2027    ADVANCE CARE PLANNING  06/29/2028    DTAP/TDAP/TD IMMUNIZATION (3 - Td  "or Tdap) 12/29/2031    PHQ-2 (once per calendar year)  Completed    Pneumococcal Vaccine: 65+ Years  Completed    HEPATITIS A IMMUNIZATION  Completed    HEPATITIS B IMMUNIZATION  Completed    HPV IMMUNIZATION  Aged Out    MENINGITIS IMMUNIZATION  Aged Out    RSV MONOCLONAL ANTIBODY  Aged Out         Review of Systems  Constitutional, HEENT, cardiovascular, pulmonary, GI, , musculoskeletal, neuro, skin, endocrine and psych systems are negative, except as otherwise noted.     Objective    Exam  /74 (BP Location: Left arm, Cuff Size: Adult Regular)   Pulse 83   Temp (!) 96.6  F (35.9  C) (Tympanic)   Resp 12   Ht 1.568 m (5' 1.75\")   Wt 60.2 kg (132 lb 11.2 oz)   SpO2 97%   BMI 24.47 kg/m     Estimated body mass index is 24.47 kg/m  as calculated from the following:    Height as of this encounter: 1.568 m (5' 1.75\").    Weight as of this encounter: 60.2 kg (132 lb 11.2 oz).    Physical Exam  GENERAL: alert and no distress  EYES: Eyes grossly normal to inspection, PERRL and conjunctivae and sclerae normal  HENT: ear canals and TM's normal, nose and mouth without ulcers or lesions, gingival swelling and palpatory pain  Mid of the tongue 1 cm nodule with hard consistency, non tender   NECK: no adenopathy, no asymmetry, masses, or scars  RESP: lungs clear to auscultation - no rales, rhonchi or wheezes  CV: regular rate and rhythm, normal S1 S2, no S3 or S4, no murmur, click or rub, no peripheral edema  ABDOMEN: soft, nontender, no hepatosplenomegaly, no masses and bowel sounds normal  MS: no gross musculoskeletal defects noted, no edema  SKIN: no suspicious lesions or rashes  NEURO: Normal strength and tone, mentation intact and speech normal  PSYCH: mentation appears normal, affect normal/bright         12/10/2024   Mini Cog   Clock Draw Score 2 Normal   3 Item Recall 2 objects recalled   Mini Cog Total Score 4            Vision Screen         Signed Electronically by: Caleb Napier MD    "

## 2024-12-10 NOTE — LETTER
December 11, 2024      Nimco Knutson  [address] [place] [address]        Dear ,    We are writing to inform you of your test results.    High cholesterol . Keep diet and exercise.   Rest of the results are normal.     Resulted Orders   Lipid panel reflex to direct LDL Non-fasting   Result Value Ref Range    Cholesterol 226 (H) <200 mg/dL    Triglycerides 71 <150 mg/dL    Direct Measure HDL 70 >=40 mg/dL    LDL Cholesterol Calculated 142 (H) <100 mg/dL    Non HDL Cholesterol 156 (H) <130 mg/dL    Patient Fasting > 8hrs? Yes     Narrative    Cholesterol  Desirable: < 200 mg/dL  Borderline High: 200 - 239 mg/dL  High: >= 240 mg/dL    Triglycerides  Normal: < 150 mg/dL  Borderline High: 150 - 199 mg/dL  High: 200-499 mg/dL  Very High: >= 500 mg/dL    Direct Measure HDL  Female: >= 50 mg/dL   Male: >= 40 mg/dL    LDL Cholesterol  Desirable: < 100 mg/dL  Above Desirable: 100 - 129 mg/dL   Borderline High: 130 - 159 mg/dL   High:  160 - 189 mg/dL   Very High: >= 190 mg/dL    Non HDL Cholesterol  Desirable: < 130 mg/dL  Above Desirable: 130 - 159 mg/dL  Borderline High: 160 - 189 mg/dL  High: 190 - 219 mg/dL  Very High: >= 220 mg/dL   CBC with platelets   Result Value Ref Range    WBC Count 6.7 4.0 - 11.0 10e3/uL    RBC Count 4.91 4.40 - 5.90 10e6/uL    Hemoglobin 15.2 13.3 - 17.7 g/dL    Hematocrit 44.7 40.0 - 53.0 %    MCV 91 78 - 100 fL    MCH 31.0 26.5 - 33.0 pg    MCHC 34.0 31.5 - 36.5 g/dL    RDW 11.9 10.0 - 15.0 %    Platelet Count 153 150 - 450 10e3/uL   Comprehensive metabolic panel (BMP + Alb, Alk Phos, ALT, AST, Total. Bili, TP)   Result Value Ref Range    Sodium 141 135 - 145 mmol/L    Potassium 4.7 3.4 - 5.3 mmol/L    Carbon Dioxide (CO2) 29 22 - 29 mmol/L    Anion Gap 7 7 - 15 mmol/L    Urea Nitrogen 18.2 8.0 - 23.0 mg/dL    Creatinine 0.97 0.67 - 1.17 mg/dL    GFR Estimate 82 >60 mL/min/1.73m2      Comment:      eGFR calculated using 2021 CKD-EPI equation.    Calcium 9.5 8.8 - 10.4 mg/dL       Comment:      Reference intervals for this test were updated on 7/16/2024 to reflect our healthy population more accurately. There may be differences in the flagging of prior results with similar values performed with this method. Those prior results can be interpreted in the context of the updated reference intervals.    Chloride 105 98 - 107 mmol/L    Glucose 97 70 - 99 mg/dL    Alkaline Phosphatase 104 40 - 150 U/L    AST 29 0 - 45 U/L    ALT 19 0 - 70 U/L    Protein Total 7.2 6.4 - 8.3 g/dL    Albumin 4.4 3.5 - 5.2 g/dL    Bilirubin Total 0.4 <=1.2 mg/dL    Patient Fasting > 8hrs? Yes    TSH with free T4 reflex   Result Value Ref Range    TSH 0.73 0.30 - 4.20 uIU/mL   PSA, screen   Result Value Ref Range    Prostate Specific Antigen Screen 4.05 0.00 - 6.50 ng/mL    Narrative    This result is obtained using the Roche Elecsys total PSA method on the sven e801 immunoassay analyzer, which is an ultrasensitive method. Results obtained with different assay methods or kits cannot be used interchangeably.  This test is intended for initial prostate cancer screening. PSA values exceeding the age-specific limits are suspicious for prostate disease, but additional testing, such as prostate biopsy, is needed to diagnose prostate pathology. The American Cancer Society recommends annual examination with digital rectal examination and serum PSA beginning at age 50 and for men with a life expectancy of at least 10 years after detection of prostate cancer. For men in high-risk groups, such as  Americans or men with a first-degree relative diagnosed at a younger age, testing should begin at a younger age. It is generally recommended that information be provided to patients about the benefits and limitations of testing and treatment so they can make informed decisions.   UA with Microscopic reflex to Culture - lab collect   Result Value Ref Range    Color Urine Yellow Colorless, Straw, Light Yellow, Yellow     Appearance Urine Clear Clear    Glucose Urine Negative Negative mg/dL    Bilirubin Urine Negative Negative    Ketones Urine Negative Negative mg/dL    Specific Gravity Urine 1.020 1.003 - 1.035    Blood Urine Negative Negative    pH Urine 5.5 5.0 - 7.0    Protein Albumin Urine Negative Negative mg/dL    Urobilinogen Urine 0.2 0.2, 1.0 E.U./dL    Nitrite Urine Negative Negative    Leukocyte Esterase Urine Negative Negative   UA Microscopic with Reflex to Culture   Result Value Ref Range    Bacteria Urine Few (A) None Seen /HPF    RBC Urine 0-2 0-2 /HPF /HPF    WBC Urine 0-5 0-5 /HPF /HPF    Mucus Urine Present (A) None Seen /LPF    Narrative    Urine Culture not indicated       If you have any questions or concerns, please call the clinic at the number listed above.       Sincerely,      Caleb Napier MD

## 2024-12-23 ENCOUNTER — THERAPY VISIT (OUTPATIENT)
Dept: PHYSICAL THERAPY | Facility: CLINIC | Age: 74
End: 2024-12-23
Attending: INTERNAL MEDICINE
Payer: COMMERCIAL

## 2024-12-23 DIAGNOSIS — H81.12 BENIGN PAROXYSMAL POSITIONAL VERTIGO OF LEFT EAR: Primary | ICD-10-CM

## 2024-12-23 PROCEDURE — 95992 CANALITH REPOSITIONING PROC: CPT | Mod: GP | Performed by: PHYSICAL THERAPIST

## 2025-06-04 DIAGNOSIS — R05.3 CHRONIC COUGH: ICD-10-CM

## 2025-06-04 DIAGNOSIS — R10.13 EPIGASTRIC PAIN: ICD-10-CM

## 2025-06-04 RX ORDER — OMEPRAZOLE 20 MG/1
20 CAPSULE, DELAYED RELEASE ORAL DAILY
Qty: 90 CAPSULE | Refills: 1 | Status: SHIPPED | OUTPATIENT
Start: 2025-06-04

## 2025-07-30 ENCOUNTER — ANCILLARY PROCEDURE (OUTPATIENT)
Dept: GENERAL RADIOLOGY | Facility: CLINIC | Age: 75
End: 2025-07-30
Attending: FAMILY MEDICINE
Payer: COMMERCIAL

## 2025-07-30 ENCOUNTER — OFFICE VISIT (OUTPATIENT)
Dept: ORTHOPEDICS | Facility: CLINIC | Age: 75
End: 2025-07-30
Payer: COMMERCIAL

## 2025-07-30 DIAGNOSIS — M17.11 PRIMARY OSTEOARTHRITIS OF RIGHT KNEE: ICD-10-CM

## 2025-07-30 DIAGNOSIS — M17.11 PRIMARY OSTEOARTHRITIS OF RIGHT KNEE: Primary | ICD-10-CM

## 2025-07-30 PROCEDURE — 73562 X-RAY EXAM OF KNEE 3: CPT | Mod: RT | Performed by: FAMILY MEDICINE

## 2025-07-30 PROCEDURE — G2211 COMPLEX E/M VISIT ADD ON: HCPCS | Performed by: FAMILY MEDICINE

## 2025-07-30 PROCEDURE — 99214 OFFICE O/P EST MOD 30 MIN: CPT | Performed by: FAMILY MEDICINE

## 2025-07-30 NOTE — PROGRESS NOTES
ASSESSMENT & PLAN  Patient Instructions     1. Primary osteoarthritis of right knee      - Patient is following up for chronic right knee pain due to osteoarthritis  -X-rays taken in office today of the right knee shows more significant medial compartment joint space narrowing compared to previous x-ray in 2021  -Patient is having intermittent pain but is still quite functional.  Patient is losing flexion motion.  As the arthritis progresses, patient will likely need a knee replacement surgery, he may be able to have a partial knee replacement surgery if the lateral patellofemoral compartments continue to remain largely unchanged  -Authorization for Synvisc 1 was ordered today  -Handicap placard form was filled out  -Patient will start Voltaren gel to be applied to the knee 3-4 times a day as needed.  Prescription was sent to his pharmacy today  -Follow-up in 2 weeks to administer the Synvisc 1 medication  -Call direct clinic number [[phone]] at any time with questions or concerns.    Albert Yeo MD [facility] and [facility] Medicine  [facility]        -----    SUBJECTIVE:  Nimco Knutson is a 74 year old male who is seen in follow-up for right knee pain. They were last seen 10/4/23.     Since their last visit reports worsening pain over the last few weeks. Patient reports pain with kneeling, squatting. He reports locking and catching when standing up after squatting. He reports increased pain and swelling when this happens. Today, he is able to bend his knee more. They indicate that their current pain level is 4/10. They have tried rest/activity avoidance, previous imaging (xray 12/29/21), viscosupplementation injection (most recent date: 10/4/23) that provided ~ 1.5 years(s) of relief, cortisone injection (most recent: 9/20/23) which provided minimal relief, and topical analgesic - warm.      The patient is seen by themselves.    Patient's past medical, surgical, social, and  family histories were reviewed today and no changes are noted.    REVIEW OF SYSTEMS:  Constitutional: NEGATIVE for fever, chills, change in weight  Skin: NEGATIVE for worrisome rashes, moles or lesions  GI/: NEGATIVE for bowel or bladder changes  Neuro: NEGATIVE for weakness, dizziness or paresthesias    OBJECTIVE:  There were no vitals taken for this visit.   General: healthy, alert and in no distress  HEENT: no scleral icterus or conjunctival erythema  Skin: no suspicious lesions or rash. No jaundice.  CV: regular rhythm by palpation, no pedal edema  Resp: normal respiratory effort without conversational dyspnea   Psych: normal mood and affect  Gait: normal steady gait with appropriate coordination and balance  Neuro: normal light touch sensory exam of the extremities.    MSK:    Independent visualization of the below image:    Recent Results (from the past 24 hours)   XR Knee Standing AP [place] Bilat Lat Right    Narrative    Severe medial compartment joint space narrowing, medial and lateral   patellar osteophytes and medial compartment osteophytes.  No acute   fracture or dislocation.     Albert Yeo MD, [facility]

## 2025-07-30 NOTE — LETTER
7/30/2025      Nimco Knutson  [address] [place] [address]      Dear Colleague,    Thank you for referring your patient, Nimco Knutson, to the [facility] [place]. Please see a copy of my visit note below.    ASSESSMENT & PLAN  Patient Instructions     1. Primary osteoarthritis of right knee      - Patient is following up for chronic right knee pain due to osteoarthritis  -X-rays taken in office today of the right knee shows more significant medial compartment joint space narrowing compared to previous x-ray in 2021  -Patient is having intermittent pain but is still quite functional.  Patient is losing flexion motion.  As the arthritis progresses, patient will likely need a knee replacement surgery, he may be able to have a partial knee replacement surgery if the lateral patellofemoral compartments continue to remain largely unchanged  -Authorization for Synvisc 1 was ordered today  -Handicap placard form was filled out  -Patient will start Voltaren gel to be applied to the knee 3-4 times a day as needed.  Prescription was sent to his pharmacy today  -Follow-up in 2 weeks to administer the Synvisc 1 medication  -Call direct clinic number [[phone]] at any time with questions or concerns.    Albert Yeo MD [facility] Orthopedics and Sports Medicine  [facility] [place]        -----    SUBJECTIVE:  Nimco Knutson is a 74 year old male who is seen in follow-up for right knee pain. They were last seen 10/4/23.     Since their last visit reports worsening pain over the last few weeks. Patient reports pain with kneeling, squatting. He reports locking and catching when standing up after squatting. He reports increased pain and swelling when this happens. Today, he is able to bend his knee more. They indicate that their current pain level is 4/10. They have tried rest/activity avoidance, previous imaging (xray 12/29/21), viscosupplementation injection (most recent  date: 10/4/23) that provided ~ 1.5 years(s) of relief, cortisone injection (most recent: 9/20/23) which provided minimal relief, and topical analgesic - warm.      The patient is seen by themselves.    Patient's past medical, surgical, social, and family histories were reviewed today and no changes are noted.    REVIEW OF SYSTEMS:  Constitutional: NEGATIVE for fever, chills, change in weight  Skin: NEGATIVE for worrisome rashes, moles or lesions  GI/: NEGATIVE for bowel or bladder changes  Neuro: NEGATIVE for weakness, dizziness or paresthesias    OBJECTIVE:  There were no vitals taken for this visit.   General: healthy, alert and in no distress  HEENT: no scleral icterus or conjunctival erythema  Skin: no suspicious lesions or rash. No jaundice.  CV: regular rhythm by palpation, no pedal edema  Resp: normal respiratory effort without conversational dyspnea   Psych: normal mood and affect  Gait: normal steady gait with appropriate coordination and balance  Neuro: normal light touch sensory exam of the extremities.    MSK:    Independent visualization of the below image:    Recent Results (from the past 24 hours)   XR Knee Standing AP [place] Bilat Lat Right    Narrative    Severe medial compartment joint space narrowing, medial and lateral   patellar osteophytes and medial compartment osteophytes.  No acute   fracture or dislocation.     Albert Yeo MD, [facility]        Again, thank you for allowing me to participate in the care of your patient.        Sincerely,        Albert Yeo, MD    Electronically signed

## 2025-07-30 NOTE — PATIENT INSTRUCTIONS
1. Primary osteoarthritis of right knee      - Patient is following up for chronic right knee pain due to osteoarthritis  -X-rays taken in office today of the right knee shows more significant medial compartment joint space narrowing compared to previous x-ray in 2021  -Patient is having intermittent pain but is still quite functional.  Patient is losing flexion motion.  As the arthritis progresses, patient will likely need a knee replacement surgery, he may be able to have a partial knee replacement surgery if the lateral patellofemoral compartments continue to remain largely unchanged  -Authorization for Synvisc 1 was ordered today  -Handicap placard form was filled out  -Patient will start Voltaren gel to be applied to the knee 3-4 times a day as needed.  Prescription was sent to his pharmacy today  -Follow-up in 2 weeks to administer the Synvisc 1 medication  -Call direct clinic number [[phone]] at any time with questions or concerns.    Albert Yeo MD [facility]  [facility] [place]

## 2025-08-13 ENCOUNTER — OFFICE VISIT (OUTPATIENT)
Dept: ORTHOPEDICS | Facility: CLINIC | Age: 75
End: 2025-08-13
Payer: COMMERCIAL

## 2025-08-13 DIAGNOSIS — M17.11 PRIMARY OSTEOARTHRITIS OF RIGHT KNEE: Primary | ICD-10-CM

## 2025-08-13 PROCEDURE — 20611 DRAIN/INJ JOINT/BURSA W/US: CPT | Mod: RT | Performed by: FAMILY MEDICINE

## 2025-08-13 RX ORDER — LIDOCAINE HYDROCHLORIDE 10 MG/ML
5 INJECTION, SOLUTION INFILTRATION; PERINEURAL
Status: COMPLETED | OUTPATIENT
Start: 2025-08-13 | End: 2025-08-13

## 2025-08-13 RX ADMIN — LIDOCAINE HYDROCHLORIDE 5 ML: 10 INJECTION, SOLUTION INFILTRATION; PERINEURAL at 13:20

## (undated) DEVICE — SOL NACL 0.9% IRRIG 1000ML BOTTLE 2F7124

## (undated) DEVICE — SU VICRYL 3-0 SH 27" UND J416H

## (undated) DEVICE — PREP POVIDONE IODINE SOLUTION 10% 4OZ

## (undated) DEVICE — GLOVE PROTEXIS BLUE W/NEU-THERA 8.0  2D73EB80

## (undated) DEVICE — PREP SKIN SCRUB TRAY 4461A

## (undated) DEVICE — BLADE CLIPPER 3M 9670

## (undated) DEVICE — SU MONOCRYL 4-0 PS-2 27" UND Y426H

## (undated) DEVICE — LINEN FULL SHEET 5511

## (undated) DEVICE — LINEN TOWEL PACK X10 5473

## (undated) DEVICE — TUBING SUCTION 12"X1/4" N612

## (undated) DEVICE — SUCTION TIP YANKAUER W/O VENT K86

## (undated) DEVICE — DRSG KERLIX FLUFFS X5

## (undated) DEVICE — ESU GROUND PAD ADULT W/CORD E7507

## (undated) DEVICE — GLOVE PROTEXIS POWDER FREE SMT 7.5  2D72PT75X

## (undated) DEVICE — PREP POVIDONE IODINE SCRUB 7.5% 4OZ APL82212

## (undated) DEVICE — PACK MINOR CUSTOM RIDGES SBA32RMRMA

## (undated) DEVICE — KIT ENDO TURNOVER/PROCEDURE W/CLEAN A SCOPE LINERS 103888

## (undated) DEVICE — SUCTION CANISTER MEDIVAC LINER 3000ML W/LID 65651-530

## (undated) DEVICE — SYR EAR BULB 3OZ 0035830

## (undated) DEVICE — SUPPORTER ATHLETIC LG LATEX 202636

## (undated) DEVICE — LINEN HALF SHEET 5512

## (undated) DEVICE — BAG CLEAR TRASH 1.3M 39X33" P4040C

## (undated) DEVICE — SU DERMABOND MINI DHVM12

## (undated) DEVICE — SU CHROMIC 3-0 PS-2 27" 1638H

## (undated) DEVICE — GLOVE PROTEXIS POWDER FREE 8.0 ORTHOPEDIC 2D73ET80

## (undated) RX ORDER — ONDANSETRON 2 MG/ML
INJECTION INTRAMUSCULAR; INTRAVENOUS
Status: DISPENSED
Start: 2018-06-05

## (undated) RX ORDER — KETOROLAC TROMETHAMINE 30 MG/ML
INJECTION, SOLUTION INTRAMUSCULAR; INTRAVENOUS
Status: DISPENSED
Start: 2018-06-05

## (undated) RX ORDER — DEXAMETHASONE SODIUM PHOSPHATE 4 MG/ML
INJECTION, SOLUTION INTRA-ARTICULAR; INTRALESIONAL; INTRAMUSCULAR; INTRAVENOUS; SOFT TISSUE
Status: DISPENSED
Start: 2018-06-05

## (undated) RX ORDER — GLYCOPYRROLATE 0.2 MG/ML
INJECTION INTRAMUSCULAR; INTRAVENOUS
Status: DISPENSED
Start: 2018-06-05

## (undated) RX ORDER — GINSENG 100 MG
CAPSULE ORAL
Status: DISPENSED
Start: 2018-06-05

## (undated) RX ORDER — LIDOCAINE HYDROCHLORIDE 10 MG/ML
INJECTION, SOLUTION EPIDURAL; INFILTRATION; INTRACAUDAL; PERINEURAL
Status: DISPENSED
Start: 2018-06-05

## (undated) RX ORDER — PROPOFOL 10 MG/ML
INJECTION, EMULSION INTRAVENOUS
Status: DISPENSED
Start: 2018-06-05

## (undated) RX ORDER — BUPIVACAINE HYDROCHLORIDE 5 MG/ML
INJECTION, SOLUTION EPIDURAL; INTRACAUDAL
Status: DISPENSED
Start: 2018-06-05

## (undated) RX ORDER — CEFAZOLIN SODIUM 2 G/100ML
INJECTION, SOLUTION INTRAVENOUS
Status: DISPENSED
Start: 2018-06-05

## (undated) RX ORDER — FENTANYL CITRATE 50 UG/ML
INJECTION, SOLUTION INTRAMUSCULAR; INTRAVENOUS
Status: DISPENSED
Start: 2018-06-05